# Patient Record
Sex: FEMALE | Race: WHITE | NOT HISPANIC OR LATINO | Employment: PART TIME | URBAN - NONMETROPOLITAN AREA
[De-identification: names, ages, dates, MRNs, and addresses within clinical notes are randomized per-mention and may not be internally consistent; named-entity substitution may affect disease eponyms.]

---
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
## 2019-04-16 ENCOUNTER — HOSPITAL ENCOUNTER (EMERGENCY)
Age: 19
Discharge: HOME | End: 2019-04-16
Payer: MEDICAID

## 2019-04-16 ENCOUNTER — APPOINTMENT (OUTPATIENT)
Dept: ULTRASOUND IMAGING | Age: 19
End: 2019-04-16
Payer: MEDICAID

## 2019-04-16 VITALS
WEIGHT: 204 LBS | HEIGHT: 63 IN | HEART RATE: 97 BPM | BODY MASS INDEX: 36.14 KG/M2 | DIASTOLIC BLOOD PRESSURE: 74 MMHG | SYSTOLIC BLOOD PRESSURE: 129 MMHG | RESPIRATION RATE: 16 BRPM | OXYGEN SATURATION: 99 % | TEMPERATURE: 98.3 F

## 2019-04-16 DIAGNOSIS — N30.01 ACUTE CYSTITIS WITH HEMATURIA: Primary | ICD-10-CM

## 2019-04-16 LAB
ALBUMIN SERPL-MCNC: 4.5 G/DL (ref 3.5–5.2)
ALP BLD-CCNC: 70 U/L (ref 35–104)
ALT SERPL-CCNC: 16 U/L (ref 5–33)
ANION GAP SERPL CALCULATED.3IONS-SCNC: 12 MMOL/L (ref 7–19)
AST SERPL-CCNC: 16 U/L (ref 5–32)
BACTERIA: ABNORMAL /HPF
BASOPHILS ABSOLUTE: 0 K/UL (ref 0–0.2)
BASOPHILS RELATIVE PERCENT: 0.2 % (ref 0–1)
BILIRUB SERPL-MCNC: 1.2 MG/DL (ref 0.2–1.2)
BILIRUBIN URINE: NEGATIVE
BLOOD, URINE: NEGATIVE
BUN BLDV-MCNC: 5 MG/DL (ref 6–20)
CALCIUM SERPL-MCNC: 9.2 MG/DL (ref 8.6–10)
CHLORIDE BLD-SCNC: 103 MMOL/L (ref 98–111)
CLARITY: ABNORMAL
CO2: 24 MMOL/L (ref 22–29)
COLOR: YELLOW
CREAT SERPL-MCNC: 0.6 MG/DL (ref 0.5–0.9)
EOSINOPHILS ABSOLUTE: 0.1 K/UL (ref 0–0.6)
EOSINOPHILS RELATIVE PERCENT: 0.7 % (ref 0–5)
EPITHELIAL CELLS, UA: 8 /HPF (ref 0–5)
GFR NON-AFRICAN AMERICAN: >60
GLUCOSE BLD-MCNC: 97 MG/DL (ref 74–109)
GLUCOSE URINE: NEGATIVE MG/DL
GONADOTROPIN, CHORIONIC (HCG) QUANT: 39.1 MIU/ML (ref 0–5.3)
HCG(URINE) PREGNANCY TEST: POSITIVE
HCT VFR BLD CALC: 41.2 % (ref 37–47)
HEMOGLOBIN: 13.6 G/DL (ref 12–16)
HYALINE CASTS: 2 /HPF (ref 0–8)
KETONES, URINE: NEGATIVE MG/DL
LEUKOCYTE ESTERASE, URINE: ABNORMAL
LIPASE: 26 U/L (ref 13–60)
LYMPHOCYTES ABSOLUTE: 2.2 K/UL (ref 1.1–4.5)
LYMPHOCYTES RELATIVE PERCENT: 27.2 % (ref 20–40)
MCH RBC QN AUTO: 27.6 PG (ref 27–31)
MCHC RBC AUTO-ENTMCNC: 33 G/DL (ref 33–37)
MCV RBC AUTO: 83.6 FL (ref 81–99)
MONOCYTES ABSOLUTE: 0.5 K/UL (ref 0–0.9)
MONOCYTES RELATIVE PERCENT: 5.9 % (ref 0–10)
NEUTROPHILS ABSOLUTE: 5.3 K/UL (ref 1.5–7.5)
NEUTROPHILS RELATIVE PERCENT: 65.8 % (ref 50–65)
NITRITE, URINE: NEGATIVE
PDW BLD-RTO: 12.4 % (ref 11.5–14.5)
PH UA: 6 (ref 5–8)
PLATELET # BLD: 333 K/UL (ref 130–400)
PMV BLD AUTO: 9.3 FL (ref 9.4–12.3)
POTASSIUM SERPL-SCNC: 3.8 MMOL/L (ref 3.5–5)
PROTEIN UA: NEGATIVE MG/DL
RBC # BLD: 4.93 M/UL (ref 4.2–5.4)
RBC UA: 2 /HPF (ref 0–4)
SODIUM BLD-SCNC: 139 MMOL/L (ref 136–145)
SPECIFIC GRAVITY UA: 1.01 (ref 1–1.03)
TOTAL PROTEIN: 7.6 G/DL (ref 6.6–8.7)
URINE REFLEX TO CULTURE: YES
UROBILINOGEN, URINE: 0.2 E.U./DL
WBC # BLD: 8 K/UL (ref 4.8–10.8)
WBC UA: 22 /HPF (ref 0–5)

## 2019-04-16 PROCEDURE — 84703 CHORIONIC GONADOTROPIN ASSAY: CPT

## 2019-04-16 PROCEDURE — 99284 EMERGENCY DEPT VISIT MOD MDM: CPT

## 2019-04-16 PROCEDURE — 84702 CHORIONIC GONADOTROPIN TEST: CPT

## 2019-04-16 PROCEDURE — 36415 COLL VENOUS BLD VENIPUNCTURE: CPT

## 2019-04-16 PROCEDURE — 81001 URINALYSIS AUTO W/SCOPE: CPT

## 2019-04-16 PROCEDURE — 99283 EMERGENCY DEPT VISIT LOW MDM: CPT | Performed by: PHYSICIAN ASSISTANT

## 2019-04-16 PROCEDURE — 80053 COMPREHEN METABOLIC PANEL: CPT

## 2019-04-16 PROCEDURE — 2580000003 HC RX 258: Performed by: PHYSICIAN ASSISTANT

## 2019-04-16 PROCEDURE — 76815 OB US LIMITED FETUS(S): CPT

## 2019-04-16 PROCEDURE — 85025 COMPLETE CBC W/AUTO DIFF WBC: CPT

## 2019-04-16 PROCEDURE — 83690 ASSAY OF LIPASE: CPT

## 2019-04-16 PROCEDURE — 87086 URINE CULTURE/COLONY COUNT: CPT

## 2019-04-16 RX ORDER — CEPHALEXIN 500 MG/1
500 CAPSULE ORAL 2 TIMES DAILY
Qty: 14 CAPSULE | Refills: 0 | Status: SHIPPED | OUTPATIENT
Start: 2019-04-16 | End: 2019-04-23

## 2019-04-16 RX ORDER — ONDANSETRON 2 MG/ML
4 INJECTION INTRAMUSCULAR; INTRAVENOUS ONCE
Status: DISCONTINUED | OUTPATIENT
Start: 2019-04-16 | End: 2019-04-16 | Stop reason: HOSPADM

## 2019-04-16 RX ORDER — 0.9 % SODIUM CHLORIDE 0.9 %
1000 INTRAVENOUS SOLUTION INTRAVENOUS ONCE
Status: COMPLETED | OUTPATIENT
Start: 2019-04-16 | End: 2019-04-16

## 2019-04-16 RX ADMIN — SODIUM CHLORIDE 1000 ML: 9 INJECTION, SOLUTION INTRAVENOUS at 12:37

## 2019-04-16 ASSESSMENT — PAIN SCALES - GENERAL: PAINLEVEL_OUTOF10: 3

## 2019-04-17 ASSESSMENT — ENCOUNTER SYMPTOMS
STRIDOR: 0
NAUSEA: 0
VOMITING: 0
CHOKING: 0
ABDOMINAL PAIN: 1
COUGH: 0
SHORTNESS OF BREATH: 0
WHEEZING: 0
DIARRHEA: 0

## 2019-04-17 NOTE — ED PROVIDER NOTES
[facility] EMERGENCY DEPT  eMERGENCYdEPARTMENT eNCOUnter      Pt Name: Radha Ellis  MRN: [id number]  [place] 2000  Date of evaluation: 4/16/2019  Provider:SANG Motley    CHIEF COMPLAINT       Chief Complaint   Patient presents with    Abdominal Pain     pt presents to ED With c/o lower abdominal pain states frequent UTI's          HISTORY OF PRESENT ILLNESS  (Location/Symptom, Timing/Onset, Context/Setting, Quality, Duration, Modifying Factors, Severity.)   Radha Ellis is a 20 y.o. female who presents to the emergency department with acute onset of abdominal pain suprapubic 48 hrs has history of UTI. She denies bleeding or discharge. She admits to unprotected sex thinks her last period was 4 weeks ago. Denies flank pain denies bowel habit changes. Denies decreased urine. Denies nausea. HPI    Nursing Notes were reviewed and I agree. REVIEW OF SYSTEMS    (2-9 systems for level 4, 10 or more for level 5)     Review of Systems   Constitutional: Negative for diaphoresis, fatigue and fever. Respiratory: Negative for cough, choking, shortness of breath, wheezing and stridor. Cardiovascular: Negative for chest pain, palpitations and leg swelling. Gastrointestinal: Positive for abdominal pain. Negative for diarrhea, nausea and vomiting. Genitourinary: Positive for dysuria and pelvic pain. Except as noted above the remainder of the review of systems was reviewed and negative. PAST MEDICAL HISTORY     Past Medical History:   Diagnosis Date    Mental disorder depression    diagnosed in 2012 with major depression, counseling monthly, off medications.  period of self mutilation(cutting) followed by suicide attempt with plan to overdose on brother's adhd medication         SURGICAL HISTORY       Past Surgical History:   Procedure Laterality Date    CHOLECYSTECTOMY, LAPAROSCOPIC N/A 2/6/2017    CHOLECYSTECTOMY LAPAROSCOPIC, with cholangiogram performed by Blaine Santana MD at [address] [place] [address] KNEE SURGERY Left broke below knee cap    OVARIAN CYST REMOVAL Right 07/15/2016         CURRENT MEDICATIONS       Discharge Medication List as of 4/16/2019  2:47 PM      CONTINUE these medications which have NOT CHANGED    Details   !! naproxen (NAPROSYN) 500 MG tablet Take 1 tablet by mouth 2 times daily, Disp-20 tablet, R-0Print      !! naproxen (NAPROSYN) 500 MG tablet Take 1 tablet by mouth 2 times daily, Disp-15 tablet, R-0Print      ondansetron (ZOFRAN ODT) 4 MG disintegrating tablet Take 1 tablet by mouth every 8 hours as needed for Nausea or Vomiting, Disp-10 tablet, R-0Print       !! - Potential duplicate medications found. Please discuss with provider. ALLERGIES     Patient has no known allergies.     FAMILY HISTORY       Family History   Problem Relation Age of Onset    Cancer Father     Cancer Paternal Aunt     Diabetes Paternal Grandfather           SOCIAL HISTORY       Social History     Socioeconomic History    Marital status: Single     Spouse name: Not on file    Number of children: Not on file    Years of education: Not on file    Highest education level: Not on file   Occupational History    Not on file   Social Needs    Financial resource strain: Not on file    Food insecurity:     Worry: Not on file     Inability: Not on file    Transportation needs:     Medical: Not on file     Non-medical: Not on file   Tobacco Use    Smoking status: Never Smoker    Smokeless tobacco: Never Used   Substance and Sexual Activity    Alcohol use: No    Drug use: No    Sexual activity: Yes     Partners: Male   Lifestyle    Physical activity:     Days per week: Not on file     Minutes per session: Not on file    Stress: Not on file   Relationships    Social connections:     Talks on phone: Not on file     Gets together: Not on file     Attends Gnosticist service: Not on file     Active member of club or organization: Not on file     Attends meetings of clubs or organizations: Not on file Relationship status: Not on file    Intimate partner violence:     Fear of current or ex partner: Not on file     Emotionally abused: Not on file     Physically abused: Not on file     Forced sexual activity: Not on file   Other Topics Concern    Not on file   Social History Narrative    Not on file       SCREENINGS           PHYSICAL EXAM    (up to 7 forlevel 4, 8 or more for level 5)     ED Triage Vitals [04/16/19 1211]   BP Temp Temp Source Pulse Resp SpO2 Height Weight   136/85 98.3 °F (36.8 °C) Oral 102 16 99 % 5' 3\" (1.6 m) 204 lb (92.5 kg)       Physical Exam   Constitutional: She is oriented to person, place, and time. She appears well-developed and well-nourished. No distress. HENT:   Head: Normocephalic and atraumatic. Right Ear: External ear normal.   Left Ear: External ear normal.   Nose: Nose normal.   Mouth/Throat: Oropharynx is clear and moist. No oropharyngeal exudate. Eyes: Pupils are equal, round, and reactive to light. Conjunctivae and EOM are normal. Right eye exhibits no discharge. Left eye exhibits no discharge. Neck: Normal range of motion. Neck supple. No thyromegaly present. Cardiovascular: Normal rate, regular rhythm, normal heart sounds and intact distal pulses. Exam reveals no friction rub. No murmur heard. Pulmonary/Chest: Effort normal and breath sounds normal. No stridor. No respiratory distress. She has no wheezes. Abdominal: Soft. Bowel sounds are normal. She exhibits no distension. There is no tenderness. Genitourinary: Vagina normal. No vaginal discharge found. Musculoskeletal: Normal range of motion. She exhibits no edema. Neurological: She is alert and oriented to person, place, and time. She displays normal reflexes. No cranial nerve deficit or sensory deficit. She exhibits normal muscle tone. Coordination normal.   Skin: Skin is warm and dry. Capillary refill takes less than 2 seconds. No rash noted. She is not diaphoretic.    Psychiatric: She has a normal mood and affect. Her behavior is normal. Judgment and thought content normal.   Nursing note and vitals reviewed. DIAGNOSTIC RESULTS     RADIOLOGY:   Non-plain film images such as CT, Ultrasound and MRI are read by the radiologist. Plain radiographic images are visualized and preliminarilyinterpreted by No att. providers found with the below findings:    Interpretation per the Radiologist below, if available at the time of this note:    US OB 1 [facility]   Final Result   1. There is no gestational sac identified in the uterus. Ectopic   pregnancy cannot be excluded on this study. Beta-hCG level is only 39   units. 2. Probable hemorrhagic cyst/follicle within the left ovary with size   measurements listed above. Developing corpus luteum cyst possible. There is a dominant follicle within the right ovary. The full report of this exam was immediately signed and available to   the emergency room. The patient is currently in the emergency room.    Signed by Dr Nadeem Garcia on 4/16/2019 2:35 PM          LABS:  Labs Reviewed   CBC WITH AUTO DIFFERENTIAL - Abnormal; Notable for the following components:       Result Value    MPV 9.3 (*)     Neutrophils % 65.8 (*)     All other components within normal limits   COMPREHENSIVE METABOLIC PANEL - Abnormal; Notable for the following components:    BUN 5 (*)     All other components within normal limits   URINE RT REFLEX TO CULTURE - Abnormal; Notable for the following components:    Clarity, UA CLOUDY (*)     Leukocyte Esterase, Urine SMALL (*)     All other components within normal limits   PREGNANCY, URINE - Abnormal; Notable for the following components:    HCG(Urine) Pregnancy Test POSITIVE (*)     All other components within normal limits    Narrative:     [facility]  [place]  [facility] ,  Urinalysis results called to and read back by faisal coker er, 04/16/2019 12:44,  by Fidelina Pineda - Abnormal; Notable for the following components:

## 2019-04-18 LAB — URINE CULTURE, ROUTINE: NORMAL

## 2019-10-29 ENCOUNTER — HOSPITAL ENCOUNTER (EMERGENCY)
Age: 19
Discharge: HOME | End: 2019-10-29
Payer: MEDICAID

## 2019-10-29 VITALS
OXYGEN SATURATION: 98 % | TEMPERATURE: 98 F | HEART RATE: 70 BPM | WEIGHT: 212 LBS | BODY MASS INDEX: 39.01 KG/M2 | HEIGHT: 62 IN | RESPIRATION RATE: 20 BRPM | SYSTOLIC BLOOD PRESSURE: 126 MMHG | DIASTOLIC BLOOD PRESSURE: 70 MMHG

## 2019-10-29 DIAGNOSIS — N39.0 URINARY TRACT INFECTION WITHOUT HEMATURIA, SITE UNSPECIFIED: ICD-10-CM

## 2019-10-29 DIAGNOSIS — O26.899 PELVIC PAIN IN PREGNANCY: Primary | ICD-10-CM

## 2019-10-29 DIAGNOSIS — R10.2 PELVIC PAIN IN PREGNANCY: Primary | ICD-10-CM

## 2019-10-29 LAB
BACTERIA: NEGATIVE /HPF
BILIRUBIN URINE: NEGATIVE
BLOOD, URINE: NEGATIVE
CLARITY: CLEAR
COLOR: YELLOW
EPITHELIAL CELLS, UA: 8 /HPF (ref 0–5)
GLUCOSE URINE: NEGATIVE MG/DL
GONADOTROPIN, CHORIONIC (HCG) QUANT: ABNORMAL MIU/ML (ref 0–5.3)
HYALINE CASTS: 3 /HPF (ref 0–8)
KETONES, URINE: 40 MG/DL
LEUKOCYTE ESTERASE, URINE: ABNORMAL
NITRITE, URINE: NEGATIVE
PH UA: 6.5 (ref 5–8)
PROTEIN UA: NEGATIVE MG/DL
RBC UA: 1 /HPF (ref 0–4)
SPECIFIC GRAVITY UA: 1.01 (ref 1–1.03)
URINE REFLEX TO CULTURE: YES
UROBILINOGEN, URINE: 1 E.U./DL
WBC UA: 10 /HPF (ref 0–5)

## 2019-10-29 PROCEDURE — 81001 URINALYSIS AUTO W/SCOPE: CPT

## 2019-10-29 PROCEDURE — 36415 COLL VENOUS BLD VENIPUNCTURE: CPT

## 2019-10-29 PROCEDURE — 84702 CHORIONIC GONADOTROPIN TEST: CPT

## 2019-10-29 PROCEDURE — 87086 URINE CULTURE/COLONY COUNT: CPT

## 2019-10-29 PROCEDURE — 99283 EMERGENCY DEPT VISIT LOW MDM: CPT

## 2019-10-29 RX ORDER — PROMETHAZINE HYDROCHLORIDE 25 MG/1
25 TABLET ORAL EVERY 6 HOURS PRN
COMMUNITY
End: 2020-05-22

## 2019-10-29 RX ORDER — CEPHALEXIN 500 MG/1
500 CAPSULE ORAL 2 TIMES DAILY
Qty: 20 CAPSULE | Refills: 0 | Status: SHIPPED | OUTPATIENT
Start: 2019-10-29 | End: 2019-11-08

## 2019-10-29 ASSESSMENT — PAIN DESCRIPTION - LOCATION: LOCATION: ABDOMEN

## 2019-10-29 ASSESSMENT — PAIN DESCRIPTION - FREQUENCY: FREQUENCY: INTERMITTENT

## 2019-10-29 ASSESSMENT — PAIN DESCRIPTION - ONSET: ONSET: ON-GOING

## 2019-10-29 ASSESSMENT — PAIN DESCRIPTION - PROGRESSION: CLINICAL_PROGRESSION: NOT CHANGED

## 2019-10-29 ASSESSMENT — PAIN DESCRIPTION - ORIENTATION: ORIENTATION: LOWER

## 2019-10-29 ASSESSMENT — PAIN DESCRIPTION - PAIN TYPE
TYPE: ACUTE PAIN
TYPE: ACUTE PAIN

## 2019-10-29 ASSESSMENT — PAIN SCALES - GENERAL: PAINLEVEL_OUTOF10: 3

## 2019-10-31 LAB — URINE CULTURE, ROUTINE: NORMAL

## 2019-11-19 PROCEDURE — 87147 CULTURE TYPE IMMUNOLOGIC: CPT | Performed by: FAMILY MEDICINE

## 2019-11-19 PROCEDURE — 87081 CULTURE SCREEN ONLY: CPT | Performed by: FAMILY MEDICINE

## 2019-11-21 ENCOUNTER — TELEPHONE (OUTPATIENT)
Dept: URGENT CARE | Facility: CLINIC | Age: 19
End: 2019-11-21

## 2019-11-21 DIAGNOSIS — J03.00 STREP TONSILLITIS: Primary | ICD-10-CM

## 2019-11-21 RX ORDER — AMOXICILLIN AND CLAVULANATE POTASSIUM 875; 125 MG/1; MG/1
1 TABLET, FILM COATED ORAL EVERY 12 HOURS SCHEDULED
Qty: 20 TABLET | Refills: 0 | Status: SHIPPED | OUTPATIENT
Start: 2019-11-21 | End: 2019-12-01

## 2020-02-17 ENCOUNTER — HOSPITAL ENCOUNTER (OUTPATIENT)
Age: 20
Discharge: HOME | End: 2020-02-17
Attending: OBSTETRICS & GYNECOLOGY | Admitting: OBSTETRICS & GYNECOLOGY
Payer: MEDICAID

## 2020-02-17 VITALS
DIASTOLIC BLOOD PRESSURE: 72 MMHG | HEART RATE: 95 BPM | TEMPERATURE: 97.1 F | RESPIRATION RATE: 14 BRPM | SYSTOLIC BLOOD PRESSURE: 114 MMHG

## 2020-02-17 PROBLEM — Z3A.22 22 WEEKS GESTATION OF PREGNANCY: Status: ACTIVE | Noted: 2020-02-17

## 2020-02-17 PROCEDURE — 99211 OFF/OP EST MAY X REQ PHY/QHP: CPT

## 2020-02-17 PROCEDURE — 4500000002 HC ER NO CHARGE

## 2020-02-17 RX ORDER — SODIUM CHLORIDE 0.9 % (FLUSH) 0.9 %
10 SYRINGE (ML) INJECTION PRN
Status: DISCONTINUED | OUTPATIENT
Start: 2020-02-17 | End: 2020-02-18 | Stop reason: HOSPADM

## 2020-02-17 RX ORDER — SODIUM CHLORIDE 0.9 % (FLUSH) 0.9 %
10 SYRINGE (ML) INJECTION EVERY 12 HOURS SCHEDULED
Status: DISCONTINUED | OUTPATIENT
Start: 2020-02-17 | End: 2020-02-18 | Stop reason: HOSPADM

## 2020-02-24 ENCOUNTER — INITIAL PRENATAL (OUTPATIENT)
Dept: OBGYN | Age: 20
End: 2020-02-24
Payer: MEDICAID

## 2020-02-24 VITALS
BODY MASS INDEX: 36.4 KG/M2 | SYSTOLIC BLOOD PRESSURE: 117 MMHG | HEART RATE: 101 BPM | DIASTOLIC BLOOD PRESSURE: 72 MMHG | WEIGHT: 199 LBS

## 2020-02-24 PROCEDURE — G8427 DOCREV CUR MEDS BY ELIG CLIN: HCPCS | Performed by: NURSE PRACTITIONER

## 2020-02-24 PROCEDURE — G8484 FLU IMMUNIZE NO ADMIN: HCPCS | Performed by: NURSE PRACTITIONER

## 2020-02-24 PROCEDURE — 1036F TOBACCO NON-USER: CPT | Performed by: NURSE PRACTITIONER

## 2020-02-24 PROCEDURE — G8419 CALC BMI OUT NRM PARAM NOF/U: HCPCS | Performed by: NURSE PRACTITIONER

## 2020-02-24 PROCEDURE — 99203 OFFICE O/P NEW LOW 30 MIN: CPT | Performed by: NURSE PRACTITIONER

## 2020-02-24 NOTE — PROGRESS NOTES
Patient presents today for initial ob visit. Pt denies any vaginal leaking bleeding or contractions. + Fetal movement. She states she has seen Dr. Amina Rodgers, changed to Dr Dani Holt, then moved to [facility] and saw Dr Lisha Mohan is here for a new transfer obstetrical visit. Today she is 23w0d weeks EGA. She is doing well and has no complaints. Last seen 4 weeks ago. Hasn't had anatomy scan yet. States had miscarriage of twins around 6 weeks, hadn't had US to confirm heartbeat when miscarried. Was on prometrium early pregnancy this time, not anymore. Previous to miscarriage, had normal  without complications. Current on pap. Plan of care was discussed with patient. Patient was encouraged to adhere to a well-balanced diet, including increasing water intake and limiting excessive caffeine and salt. The benefits of exercise were discussed; however she was advised against heavy lifting, sit-ups and abdominal crunches. A list of safe OTC medications was provided and discussed. The patient was cautioned against the use of tanning beds, hot tubs, saunas, and x-rays. Avoidance of tobacco, alcohol and illicit drugs was also discussed due to harmful effects on the fetus and increased risks associated with pregnancy. Certain labs and ultrasounds are required at certain times during pregnancy but others are optional, including the serum integrated screen/[place]/AFP/ Panorama, and other genetic testing. The patient was encouraged to attend childbirth classes and general hospital information was provided based on patients hospital of choice. She  does not have vaginal bleeding, leaking of fluid, contractions. She does not have blurred vision, SOB, or increased swelling in legs or face. Pt does feel fetal movement regularly. O:   Vitals:    20 1053   BP: 117/72   Pulse: 101   Weight: 199 lb (90.3 kg)     Pt is A&Ox3, in no acute distress. Normocephalic, atraumatic. PERRL.  Resp even and

## 2020-02-24 NOTE — PATIENT INSTRUCTIONS
Patient Education        Weeks 22 to 26 of Your Pregnancy: Care Instructions  Your Care Instructions    As you enter your 7th month of pregnancy at week 26, your baby's lungs are growing stronger and getting ready to breathe. You may notice that your baby responds to the sound of your or your partner's voice. You may also notice that your baby does less turning and twisting and more squirming or jerking. Jerking often means that your baby has the hiccups. Hiccups are perfectly normal and are only temporary. You may want to think about attending a childbirth preparation class. This is also a good time to start thinking about whether you want to have pain medicine during labor. Most pregnant women are tested for gestational diabetes between weeks 25 and 28. Gestational diabetes occurs when your blood sugar level gets too high when you're pregnant. The test is important, because you can have gestational diabetes and not know it. But the condition can cause problems for your baby. Follow-up care is a key part of your treatment and safety. Be sure to make and go to all appointments, and call your doctor if you are having problems. It's also a good idea to know your test results and keep a list of the medicines you take. How can you care for yourself at home? Ease discomfort from your baby's kicking  · Change your position. Sometimes this will cause your baby to change position too. · Take a deep breath while you raise your arm over your head. Then breathe out while you drop your arm. Do Kegel exercises to prevent urine from leaking  · You can do Kegel exercises while you stand or sit. ? Squeeze the same muscles you would use to stop your urine. Your belly and thighs should not move. ? Hold the squeeze for 3 seconds, and then relax for 3 seconds. ? Start with 3 seconds. Then add 1 second each week until you are able to squeeze for 10 seconds. ? Repeat the exercise 10 to 15 times for each session.  Do three or more sessions each day. Ease or reduce swelling in your feet, ankles, hands, and fingers  · If your fingers are puffy, take off your rings. · Do not eat high-salt foods, such as potato chips. · Prop up your feet on a stool or couch as much as possible. Sleep with pillows under your feet. · Do not stand for long periods of time or wear tight shoes. · Wear support stockings. Where can you learn more? Go to https://Hig[employer] One.Medstory. org and sign in to your [employer] account. Enter G264 in the [employer] box to learn more about \"Weeks 22 to 26 of Your Pregnancy: Care Instructions. \"     If you do not have an account, please click on the \"Sign Up Now\" link. Current as of: May 29, 2019  Content Version: 12.3  © 0674-9871 Healthwise, Incorporated. Care instructions adapted under license by [facility] ([facility]). If you have questions about a medical condition or this instruction, always ask your healthcare professional. [address] any warranty or liability for your use of this information.

## 2020-03-03 ENCOUNTER — TELEPHONE (OUTPATIENT)
Dept: OBGYN | Age: 20
End: 2020-03-03

## 2020-03-24 ENCOUNTER — ROUTINE PRENATAL (OUTPATIENT)
Dept: OBGYN | Age: 20
End: 2020-03-24
Payer: MEDICAID

## 2020-03-24 VITALS
DIASTOLIC BLOOD PRESSURE: 78 MMHG | SYSTOLIC BLOOD PRESSURE: 111 MMHG | WEIGHT: 196 LBS | HEART RATE: 77 BPM | BODY MASS INDEX: 35.85 KG/M2

## 2020-03-24 DIAGNOSIS — Z34.82 ENCOUNTER FOR SUPERVISION OF OTHER NORMAL PREGNANCY IN SECOND TRIMESTER: ICD-10-CM

## 2020-03-24 LAB
BASOPHILS ABSOLUTE: 0 K/UL (ref 0–0.2)
BASOPHILS RELATIVE PERCENT: 0.1 % (ref 0–1)
EOSINOPHILS ABSOLUTE: 0 K/UL (ref 0–0.6)
EOSINOPHILS RELATIVE PERCENT: 0.6 % (ref 0–5)
GLUCOSE, 1HR PP: 191 MG/DL (ref 75–140)
HCT VFR BLD CALC: 37.6 % (ref 37–47)
HEMOGLOBIN: 12.4 G/DL (ref 12–16)
IMMATURE GRANULOCYTES #: 0 K/UL
LYMPHOCYTES ABSOLUTE: 1.5 K/UL (ref 1.1–4.5)
LYMPHOCYTES RELATIVE PERCENT: 20.2 % (ref 20–40)
MCH RBC QN AUTO: 27.8 PG (ref 27–31)
MCHC RBC AUTO-ENTMCNC: 33 G/DL (ref 33–37)
MCV RBC AUTO: 84.3 FL (ref 81–99)
MONOCYTES ABSOLUTE: 0.4 K/UL (ref 0–0.9)
MONOCYTES RELATIVE PERCENT: 5.4 % (ref 0–10)
NEUTROPHILS ABSOLUTE: 5.3 K/UL (ref 1.5–7.5)
NEUTROPHILS RELATIVE PERCENT: 73.4 % (ref 50–65)
PDW BLD-RTO: 13.2 % (ref 11.5–14.5)
PLATELET # BLD: 206 K/UL (ref 130–400)
PMV BLD AUTO: 10.2 FL (ref 9.4–12.3)
RBC # BLD: 4.46 M/UL (ref 4.2–5.4)
WBC # BLD: 7.2 K/UL (ref 4.8–10.8)

## 2020-03-24 PROCEDURE — 99212 OFFICE O/P EST SF 10 MIN: CPT | Performed by: OBSTETRICS & GYNECOLOGY

## 2020-03-24 PROCEDURE — G8427 DOCREV CUR MEDS BY ELIG CLIN: HCPCS | Performed by: OBSTETRICS & GYNECOLOGY

## 2020-03-24 PROCEDURE — G8484 FLU IMMUNIZE NO ADMIN: HCPCS | Performed by: OBSTETRICS & GYNECOLOGY

## 2020-03-24 PROCEDURE — G8419 CALC BMI OUT NRM PARAM NOF/U: HCPCS | Performed by: OBSTETRICS & GYNECOLOGY

## 2020-03-24 PROCEDURE — 1036F TOBACCO NON-USER: CPT | Performed by: OBSTETRICS & GYNECOLOGY

## 2020-03-24 NOTE — PATIENT INSTRUCTIONS
Patient Education        Weeks 26 to 30 of Your Pregnancy: Care Instructions  Your Care Instructions    You are now in your last trimester of pregnancy. Your baby is growing rapidly. And you'll probably feel your baby moving around more often. Your doctor may ask you to count your baby's kicks. Your back may ache as your body gets used to your baby's size and length. If you haven't already had the Tdap shot during this pregnancy, talk to your doctor about getting it. It will help protect your  against pertussis infection. During this time, it's important to take care of yourself and pay attention to what your body needs. If you feel sexual, explore ways to be close with your partner that match your comfort and desire. Use the tips provided in this care sheet to find ways to be sexual in your own way. Follow-up care is a key part of your treatment and safety. Be sure to make and go to all appointments, and call your doctor if you are having problems. It's also a good idea to know your test results and keep a list of the medicines you take. How can you care for yourself at home? Take it easy at work  · Take frequent breaks. If possible, stop working when you are tired, and rest during your lunch hour. · Take bathroom breaks every 2 hours. · Change positions often. If you sit for long periods, stand up and walk around. · When you stand for a long time, keep one foot on a low stool with your knee bent. After standing a lot, sit with your feet up. · Avoid fumes, chemicals, and tobacco smoke. Be sexual in your own way  · Having sex during pregnancy is okay, unless your doctor tells you not to. · You may be very interested in sex, or you may have no interest at all. · Your growing belly can make it hard to find a good position during intercourse. [place] and explore. · You may get cramps in your uterus when your partner touches your breasts.   · A back rub may relieve the backache or cramps that

## 2020-04-20 ENCOUNTER — HOSPITAL ENCOUNTER (OUTPATIENT)
Age: 20
Discharge: HOME | End: 2020-04-20
Attending: OBSTETRICS & GYNECOLOGY | Admitting: OBSTETRICS & GYNECOLOGY
Payer: MEDICAID

## 2020-04-20 VITALS
SYSTOLIC BLOOD PRESSURE: 123 MMHG | RESPIRATION RATE: 16 BRPM | TEMPERATURE: 98.6 F | DIASTOLIC BLOOD PRESSURE: 75 MMHG | HEART RATE: 85 BPM

## 2020-04-20 LAB
BACTERIA: ABNORMAL /HPF
BILIRUBIN URINE: NEGATIVE
BLOOD, URINE: NEGATIVE
CLARITY: ABNORMAL
COLOR: ABNORMAL
EPITHELIAL CELLS, UA: ABNORMAL /HPF
GLUCOSE URINE: NEGATIVE MG/DL
KETONES, URINE: ABNORMAL MG/DL
LEUKOCYTE ESTERASE, URINE: ABNORMAL
NITRITE, URINE: NEGATIVE
PH UA: 6 (ref 5–8)
PROTEIN UA: ABNORMAL MG/DL
SPECIFIC GRAVITY UA: 1.03 (ref 1–1.03)
UROBILINOGEN, URINE: 1 E.U./DL
WBC UA: ABNORMAL /HPF (ref 0–5)

## 2020-04-20 PROCEDURE — 99211 OFF/OP EST MAY X REQ PHY/QHP: CPT

## 2020-04-20 PROCEDURE — 6370000000 HC RX 637 (ALT 250 FOR IP): Performed by: OBSTETRICS & GYNECOLOGY

## 2020-04-20 PROCEDURE — 81001 URINALYSIS AUTO W/SCOPE: CPT

## 2020-04-20 PROCEDURE — 4500000002 HC ER NO CHARGE

## 2020-04-20 RX ORDER — SODIUM CHLORIDE 0.9 % (FLUSH) 0.9 %
10 SYRINGE (ML) INJECTION PRN
Status: DISCONTINUED | OUTPATIENT
Start: 2020-04-20 | End: 2020-04-20 | Stop reason: HOSPADM

## 2020-04-20 RX ORDER — SODIUM CHLORIDE 0.9 % (FLUSH) 0.9 %
10 SYRINGE (ML) INJECTION EVERY 12 HOURS SCHEDULED
Status: DISCONTINUED | OUTPATIENT
Start: 2020-04-20 | End: 2020-04-20 | Stop reason: HOSPADM

## 2020-04-20 RX ORDER — ONDANSETRON 2 MG/ML
4 INJECTION INTRAMUSCULAR; INTRAVENOUS EVERY 6 HOURS PRN
Status: DISCONTINUED | OUTPATIENT
Start: 2020-04-20 | End: 2020-04-20 | Stop reason: HOSPADM

## 2020-04-20 RX ORDER — ACETAMINOPHEN 325 MG/1
650 TABLET ORAL EVERY 4 HOURS PRN
Status: DISCONTINUED | OUTPATIENT
Start: 2020-04-20 | End: 2020-04-20 | Stop reason: HOSPADM

## 2020-04-20 RX ORDER — PROMETHAZINE HYDROCHLORIDE 25 MG/1
12.5 TABLET ORAL EVERY 6 HOURS PRN
Status: DISCONTINUED | OUTPATIENT
Start: 2020-04-20 | End: 2020-04-20 | Stop reason: HOSPADM

## 2020-04-20 RX ORDER — NITROFURANTOIN 25; 75 MG/1; MG/1
100 CAPSULE ORAL EVERY 12 HOURS SCHEDULED
Status: DISCONTINUED | OUTPATIENT
Start: 2020-04-20 | End: 2020-04-20 | Stop reason: HOSPADM

## 2020-04-20 RX ADMIN — NITROFURANTOIN MONOHYDRATE/MACROCRYSTALLINE 100 MG: 25; 75 CAPSULE ORAL at 19:00

## 2020-05-04 ENCOUNTER — HOSPITAL ENCOUNTER (OUTPATIENT)
Age: 20
Discharge: HOME | End: 2020-05-04
Attending: ADVANCED PRACTICE MIDWIFE | Admitting: ADVANCED PRACTICE MIDWIFE
Payer: MEDICAID

## 2020-05-04 VITALS
RESPIRATION RATE: 14 BRPM | SYSTOLIC BLOOD PRESSURE: 121 MMHG | TEMPERATURE: 98.2 F | HEART RATE: 99 BPM | DIASTOLIC BLOOD PRESSURE: 71 MMHG

## 2020-05-04 PROBLEM — Z3A.33 33 WEEKS GESTATION OF PREGNANCY: Status: ACTIVE | Noted: 2020-05-04

## 2020-05-04 PROCEDURE — 59025 FETAL NON-STRESS TEST: CPT

## 2020-05-04 PROCEDURE — 4500000002 HC ER NO CHARGE

## 2020-05-04 PROCEDURE — 99211 OFF/OP EST MAY X REQ PHY/QHP: CPT

## 2020-05-05 LAB
GLUCOSE FASTING: 85 MG/DL (ref 75–110)
GLUCOSE TOLERANCE TEST 1 HOUR: 205 MG/DL (ref 75–190)
GLUCOSE TOLERANCE TEST 2 HOUR: 184 MG/DL (ref 75–140)
GLUCOSE, 3 HOUR: 137 MG/DL (ref 75–145)

## 2020-05-05 NOTE — FLOWSHEET NOTE
Pt presents with c/o mild contractions since 4am this morning causing some pelvic pressure. Reports feeling them about every 30 min. Denies any leaking or vaginal bleeding. +FM. Pt arrived to labor room. Pt given gown and assisted to bed. EFM and toco applied. Abdomen soft and non tender.

## 2020-05-08 ENCOUNTER — TELEMEDICINE (OUTPATIENT)
Dept: OBGYN | Age: 20
End: 2020-05-08
Payer: MEDICAID

## 2020-05-08 PROCEDURE — 99212 OFFICE O/P EST SF 10 MIN: CPT | Performed by: OBSTETRICS & GYNECOLOGY

## 2020-05-08 PROCEDURE — G8427 DOCREV CUR MEDS BY ELIG CLIN: HCPCS | Performed by: OBSTETRICS & GYNECOLOGY

## 2020-05-08 NOTE — PROGRESS NOTES
Prenatal VV Office Note    TELEHEALTH EVALUATION -- Audio/Visual (During XIMJS-01 public health emergency)    Felisha Nair is a 23 y.o. female who presents today for her medical conditions/ complaints as noted below. Chief Complaint   Patient presents with    Routine Prenatal Visit       Subjective:  Felisha Nair signed on for VV/return obstetrical visit. Today she is 33w4d weeks EGA. She is doing well, taking her PNV as directed, and has no complaints. She  does not have vaginal bleeding, n/v, syncope, or headaches. Pt does feel fetal movement regularly. GTT confirmed GDM. Patient instructed on FS fasting and PP. Weekly NST. Patient Active Problem List   Diagnosis    Liver enzyme elevation    Choledocholithiasis    Abdominal pain, right upper quadrant    Elevated bilirubin    Gallstones    22 weeks gestation of pregnancy    33 weeks gestation of pregnancy       Patient's last menstrual period was 09/16/2019. [id number]    Past Medical History:   Diagnosis Date    Mental disorder depression    diagnosed in 2012 with major depression, counseling monthly, off medications.  period of self mutilation(cutting) followed by suicide attempt with plan to overdose on brother's adhd medication     Past Surgical History:   Procedure Laterality Date    CHOLECYSTECTOMY, LAPAROSCOPIC N/A 2/6/2017    CHOLECYSTECTOMY LAPAROSCOPIC, with cholangiogram performed by Clint Flores MD at [place] [address] Left broke below knee cap    OVARIAN CYST REMOVAL Right 07/15/2016     Family History   Problem Relation Age of Onset    Cancer Father     Cancer Paternal Aunt     Diabetes Paternal Grandfather      Social History     Tobacco Use    Smoking status: Never Smoker    Smokeless tobacco: Never Used   Substance Use Topics    Alcohol use: No       Current Outpatient Medications   Medication Sig Dispense Refill    Prenatal Vit-DSS-Fe Cbn-FA (PRENATAL AD PO) Take by mouth      promethazine (PHENERGAN) 25 MG tablet Take 25 mg by mouth every 6 hours as needed for Nausea       No current facility-administered medications for this visit. No Known Allergies  There were no vitals filed for this visit. There is no height or weight on file to calculate BMI. Due to this being a TeleHealth encounter, evaluation of the following organ systems is limited: Vitals/Constitutional/EENT/Resp/CV/GI//MS/Neuro/Skin/Heme-Lymph-Imm. Objective:     Pt is A&Ox3, in no acute distress. Normocephalic, atraumatic. PERRL. Resp even and non-labored. Skin pink, warm & dry. Gravid abdomen. GONZALEZ's well. Gait steady. PLAN:  IUP at 33w4d wks      Diagnosis Orders   1. 33 weeks gestation of pregnancy     2. Gestational diabetes mellitus (GDM) in third trimester, gestational diabetes method of control unspecified        Pt counseled on balanced nutrition, adequate fluid intake, taking PNV daily, and exercise along with labor precautions, GHTN precautions, Kick count,  labor and Genetic testing   Continue with routine prenatal care.  RTC in 1 wks for prenatal visit with NST   Send FS via [facility]     Pursuant to the emergency declaration under the [facility] waiver authority and the [employer] and [employer] General Act, this Virtual  Visit was conducted, with patient's consent, to reduce the patient's risk of exposure to COVID-19 and provide continuity of care for an established patient. Services were provided through a video synchronous discussion virtually to substitute for in-person clinic visit.

## 2020-05-08 NOTE — PATIENT INSTRUCTIONS
taking an over-the-counter stool softener. Consider breastfeeding  · Experts recommend that women breastfeed for 1 year or longer. Breast milk is the perfect food for babies. · Breast milk is easier for babies to digest than formula. And it is always available, just the right temperature, and free. · Breast milk may help protect your child from some health problems.  babies are less likely than formula-fed babies to:  ? Get ear infections, colds, diarrhea, and pneumonia. ? Be obese or get diabetes later in life. · Women who breastfeed have less bleeding after the birth. Their uteruses also shrink back faster. · Some women who breastfeed lose weight faster. Making milk burns calories. · Breastfeeding can lower your risk of breast cancer, ovarian cancer, and osteoporosis. Decide about circumcision for boys  · As you make this decision, it may help to think about your personal, Jew, and family traditions. You get to decide if you will keep your son's penis natural or if he will be circumcised. · If you decide that you would like to have your baby circumcised, talk with your doctor. You can share your concerns about pain. And you can discuss your preferences for anesthesia. Where can you learn more? Go to https://Cirrus Insightpepiceweb.healthAfrica [email or web address] org and sign in to your [employer]ink display technologies account. Enter F924 in the [employer] box to learn more about \"Weeks 32 to 34 of Your Pregnancy: Care Instructions. \"     If you do not have an account, please click on the \"Sign Up Now\" link. Current as of: May 29, 2019Content Version: 12.4  © 6440-4812 Healthwise, Incorporated. Care instructions adapted under license by [facility] [place] ([facility]). If you have questions about a medical condition or this instruction, always ask your healthcare professional. [place] [address] any warranty or liability for your use of this information.

## 2020-05-22 ENCOUNTER — ROUTINE PRENATAL (OUTPATIENT)
Dept: OBGYN | Age: 20
End: 2020-05-22
Payer: MEDICAID

## 2020-05-22 VITALS
SYSTOLIC BLOOD PRESSURE: 140 MMHG | BODY MASS INDEX: 37.31 KG/M2 | DIASTOLIC BLOOD PRESSURE: 77 MMHG | WEIGHT: 204 LBS | HEART RATE: 80 BPM

## 2020-05-22 PROCEDURE — G8427 DOCREV CUR MEDS BY ELIG CLIN: HCPCS | Performed by: OBSTETRICS & GYNECOLOGY

## 2020-05-22 PROCEDURE — 1036F TOBACCO NON-USER: CPT | Performed by: OBSTETRICS & GYNECOLOGY

## 2020-05-22 PROCEDURE — 59025 FETAL NON-STRESS TEST: CPT | Performed by: OBSTETRICS & GYNECOLOGY

## 2020-05-22 PROCEDURE — G8419 CALC BMI OUT NRM PARAM NOF/U: HCPCS | Performed by: OBSTETRICS & GYNECOLOGY

## 2020-05-22 PROCEDURE — 99213 OFFICE O/P EST LOW 20 MIN: CPT | Performed by: OBSTETRICS & GYNECOLOGY

## 2020-05-22 NOTE — PROGRESS NOTES
Pt presents today for a routine prenatal visit. Pt denies vaginal bleeding, leaking of fluid or cramping. Pt states + fetal movement. NST and GBS done today.
GDM  Discussed IOL at 38 weeks due to noncompliant GDM. RTC in 1 wks for prenatal visit with NST    MEDICATIONS:  No orders of the defined types were placed in this encounter. ORDERS:  Orders Placed This Encounter   Procedures    Culture, Strep B Screen, Vaginal/Rectal    Sexually Transmitted Disease Panel 1 TP    IA FETAL NON-STRESS TEST       I, Jesus Brown MD, personally performed services described in this document as scribed by Author TARIQ Bustillo in my presence, and it is both accurate and complete.

## 2020-05-25 LAB — GROUP B STREP CULTURE: NORMAL

## 2020-05-28 ENCOUNTER — ROUTINE PRENATAL (OUTPATIENT)
Dept: OBGYN | Age: 20
End: 2020-05-28
Payer: MEDICAID

## 2020-05-28 VITALS
WEIGHT: 202 LBS | HEART RATE: 80 BPM | SYSTOLIC BLOOD PRESSURE: 125 MMHG | BODY MASS INDEX: 36.95 KG/M2 | DIASTOLIC BLOOD PRESSURE: 71 MMHG

## 2020-05-28 PROCEDURE — 59025 FETAL NON-STRESS TEST: CPT | Performed by: ADVANCED PRACTICE MIDWIFE

## 2020-05-28 PROCEDURE — 1036F TOBACCO NON-USER: CPT | Performed by: ADVANCED PRACTICE MIDWIFE

## 2020-05-28 PROCEDURE — 99213 OFFICE O/P EST LOW 20 MIN: CPT | Performed by: ADVANCED PRACTICE MIDWIFE

## 2020-05-28 PROCEDURE — G8419 CALC BMI OUT NRM PARAM NOF/U: HCPCS | Performed by: ADVANCED PRACTICE MIDWIFE

## 2020-05-28 PROCEDURE — G8427 DOCREV CUR MEDS BY ELIG CLIN: HCPCS | Performed by: ADVANCED PRACTICE MIDWIFE

## 2020-05-30 ENCOUNTER — HOSPITAL ENCOUNTER (OUTPATIENT)
Age: 20
Discharge: HOME | End: 2020-05-30
Attending: OBSTETRICS & GYNECOLOGY | Admitting: OBSTETRICS & GYNECOLOGY
Payer: MEDICAID

## 2020-05-30 VITALS
TEMPERATURE: 97.3 F | SYSTOLIC BLOOD PRESSURE: 110 MMHG | DIASTOLIC BLOOD PRESSURE: 56 MMHG | HEART RATE: 79 BPM | RESPIRATION RATE: 20 BRPM

## 2020-05-30 PROBLEM — M54.50 LOW BACK PAIN: Status: ACTIVE | Noted: 2020-05-30

## 2020-05-30 LAB
BACTERIA: ABNORMAL /HPF
BILIRUBIN URINE: NEGATIVE
BLOOD, URINE: NEGATIVE
CLARITY: ABNORMAL
COLOR: YELLOW
EPITHELIAL CELLS, UA: 7 /HPF (ref 0–5)
GLUCOSE URINE: NEGATIVE MG/DL
HYALINE CASTS: 4 /HPF (ref 0–8)
KETONES, URINE: NEGATIVE MG/DL
LEUKOCYTE ESTERASE, URINE: ABNORMAL
NITRITE, URINE: NEGATIVE
PH UA: 7.5 (ref 5–8)
PROTEIN UA: NEGATIVE MG/DL
RBC UA: 0 /HPF (ref 0–4)
SPECIFIC GRAVITY UA: 1.01 (ref 1–1.03)
UROBILINOGEN, URINE: 1 E.U./DL
WBC UA: 14 /HPF (ref 0–5)
YEAST: ABNORMAL /HPF

## 2020-05-30 PROCEDURE — 4500000002 HC ER NO CHARGE

## 2020-05-30 PROCEDURE — 81001 URINALYSIS AUTO W/SCOPE: CPT

## 2020-05-30 PROCEDURE — 99211 OFF/OP EST MAY X REQ PHY/QHP: CPT

## 2020-05-30 RX ORDER — ONDANSETRON 2 MG/ML
4 INJECTION INTRAMUSCULAR; INTRAVENOUS EVERY 6 HOURS PRN
Status: DISCONTINUED | OUTPATIENT
Start: 2020-05-30 | End: 2020-05-30 | Stop reason: HOSPADM

## 2020-05-30 RX ORDER — PROMETHAZINE HYDROCHLORIDE 25 MG/1
12.5 TABLET ORAL EVERY 6 HOURS PRN
Status: DISCONTINUED | OUTPATIENT
Start: 2020-05-30 | End: 2020-05-30 | Stop reason: HOSPADM

## 2020-05-30 RX ORDER — ACETAMINOPHEN 325 MG/1
650 TABLET ORAL EVERY 4 HOURS PRN
Status: DISCONTINUED | OUTPATIENT
Start: 2020-05-30 | End: 2020-05-30 | Stop reason: HOSPADM

## 2020-05-30 NOTE — FLOWSHEET NOTE
Pt arrived from ER with complaints of low back pain and pressure. Pt states she has been constipated. Voided spec collected. Pt to bed.  efm applied. abd soft and nontender. sve done. No change from when she was checked earlier in the week at appt. Pt denies any leaking of fluid or vaginal bleeding.

## 2020-05-31 LAB
CHLAMYDIA TRACHOMATIS AMPLIFIED DET: NEGATIVE
N GONORRHOEAE AMPLIFIED DET: NEGATIVE
SPECIMEN SOURCE: NORMAL
T. VAGINALIS AMPLIFIED: NEGATIVE

## 2020-06-02 NOTE — PATIENT INSTRUCTIONS
Patient Education        Week 45 of Your Pregnancy: Care Instructions  Your Care Instructions     Believe it or not, your baby is almost here. You may have ideas about your baby's personality because of how much he or she moves. Or you may have noticed how he or she responds to sounds, warmth, cold, and light. You may even know what kind of music your baby likes. By now, you have a better idea of what to expect during delivery. You may have talked about your birth preferences with your doctor. But even if you want a vaginal birth, it is a good idea to learn about  births.  birth means that your baby is born through a cut (incision) in your lower belly. It is sometimes the best choice for the health of the baby and the mother. This care sheet can help you understand  births. It also gives you information about what to expect after your baby is born. And it helps you understand more about postpartum depression. Follow-up care is a key part of your treatment and safety. Be sure to make and go to all appointments, and call your doctor if you are having problems. It's also a good idea to know your test results and keep a list of the medicines you take. How can you care for yourself at home? Learn about  birth  · Most C-sections are unplanned. They are done because of problems that occur during labor. These problems might include:  ? Labor that slows or stops. ? High blood pressure or other problems for the mother. ? Signs of distress in the baby. These signs may include a very fast or slow heart rate. · Although most mothers and babies do well after , it is major surgery. It has more risks than a vaginal delivery. · In some cases, a planned  may be safer than a vaginal delivery. This may be the case if:  ? The mother has a health problem, such as a heart condition. ? The baby isn't in a head-down position for delivery. This is called a breech position. ?  The uterus has scars from past surgeries. This could increase the chance of a tear in the uterus. ? There is a problem with the placenta. ? The mother has an infection, such as genital herpes, that could be spread to the baby. ? The mother is having twins or more. ? The baby weighs 9 to 10 pounds or more. · Because of the risks of , planned C-sections generally should be done only for medical reasons. And a planned  should be done at 39 weeks or later unless there is a medical reason to do it sooner. Know what to expect after delivery, and plan for the first few weeks at home  · You, your baby, and your partner or  will get identification bands. Only people with matching bands can  the baby from the nursery. · You will learn how to feed, diaper, and bathe your baby. And you will learn how to care for the umbilical cord stump. If your baby will be circumcised, you will also learn how to care for that. · Ask people to wait to visit you until you are at home. And ask them to wash their hands before they touch your baby. · Make sure you have another adult in your home for at least 2 or 3 days after the birth. · During the first 2 weeks, limit when friends and family can visit. · Do not allow visitors who have colds or infections. Make sure all visitors are up to date with their vaccinations. Never let anyone smoke around your baby. · Try to nap when the baby naps. Be aware of postpartum depression  · \"Baby blues\" are common for the first 1 to 2 weeks after birth. You may cry or feel sad or irritable for no reason. · For some women, these feelings last longer and are more intense. This is called postpartum depression. · If your symptoms last for more than a few weeks or you feel very depressed, ask your doctor for help. · Postpartum depression can be treated. Support groups and counseling can help. Sometimes medicine can also help. Where can you learn more?   Go to [email or web address] and sign in to your PrivacyStar account. Enter B044 in the [facility] box to learn more about \"Week 38 of Your Pregnancy: Care Instructions. \"     If you do not have an account, please click on the \"Sign Up Now\" link. Current as of: February 11, 2020               Content Version: 12.5  © 9346-3530 [employer], [facility]. Care instructions adapted under license by [facility] [place] ([facility]). If you have questions about a medical condition or this instruction, always ask your healthcare professional. [place] [address] any warranty or liability for your use of this information.

## 2020-06-04 ENCOUNTER — HOSPITAL ENCOUNTER (INPATIENT)
Age: 20
LOS: 3 days | Discharge: HOME | End: 2020-06-07
Attending: OBSTETRICS & GYNECOLOGY | Admitting: OBSTETRICS & GYNECOLOGY
Payer: MEDICAID

## 2020-06-04 ENCOUNTER — ROUTINE PRENATAL (OUTPATIENT)
Dept: OBGYN | Age: 20
End: 2020-06-04
Payer: MEDICAID

## 2020-06-04 VITALS
BODY MASS INDEX: 36.95 KG/M2 | SYSTOLIC BLOOD PRESSURE: 122 MMHG | DIASTOLIC BLOOD PRESSURE: 72 MMHG | HEART RATE: 95 BPM | WEIGHT: 202 LBS

## 2020-06-04 LAB
ABO/RH: NORMAL
AMPHETAMINE SCREEN, URINE: NEGATIVE
ANTIBODY SCREEN: NORMAL
BARBITURATE SCREEN URINE: NEGATIVE
BASOPHILS ABSOLUTE: 0 K/UL (ref 0–0.2)
BASOPHILS RELATIVE PERCENT: 0.1 % (ref 0–1)
BENZODIAZEPINE SCREEN, URINE: NEGATIVE
CANNABINOID SCREEN URINE: NEGATIVE
COCAINE METABOLITE SCREEN URINE: NEGATIVE
EOSINOPHILS ABSOLUTE: 0 K/UL (ref 0–0.6)
EOSINOPHILS RELATIVE PERCENT: 0.5 % (ref 0–5)
HCT VFR BLD CALC: 36.5 % (ref 37–47)
HEMOGLOBIN: 12.2 G/DL (ref 12–16)
IMMATURE GRANULOCYTES #: 0 K/UL
LYMPHOCYTES ABSOLUTE: 2.2 K/UL (ref 1.1–4.5)
LYMPHOCYTES RELATIVE PERCENT: 25.9 % (ref 20–40)
Lab: NORMAL
MCH RBC QN AUTO: 27.3 PG (ref 27–31)
MCHC RBC AUTO-ENTMCNC: 33.4 G/DL (ref 33–37)
MCV RBC AUTO: 81.7 FL (ref 81–99)
MONOCYTES ABSOLUTE: 0.7 K/UL (ref 0–0.9)
MONOCYTES RELATIVE PERCENT: 7.9 % (ref 0–10)
NEUTROPHILS ABSOLUTE: 5.5 K/UL (ref 1.5–7.5)
NEUTROPHILS RELATIVE PERCENT: 65.4 % (ref 50–65)
OPIATE SCREEN URINE: NEGATIVE
PDW BLD-RTO: 13.1 % (ref 11.5–14.5)
PLATELET # BLD: 214 K/UL (ref 130–400)
PMV BLD AUTO: 10.5 FL (ref 9.4–12.3)
RBC # BLD: 4.47 M/UL (ref 4.2–5.4)
WBC # BLD: 8.4 K/UL (ref 4.8–10.8)

## 2020-06-04 PROCEDURE — 86900 BLOOD TYPING SEROLOGIC ABO: CPT

## 2020-06-04 PROCEDURE — G8419 CALC BMI OUT NRM PARAM NOF/U: HCPCS | Performed by: ADVANCED PRACTICE MIDWIFE

## 2020-06-04 PROCEDURE — 59025 FETAL NON-STRESS TEST: CPT | Performed by: ADVANCED PRACTICE MIDWIFE

## 2020-06-04 PROCEDURE — 86850 RBC ANTIBODY SCREEN: CPT

## 2020-06-04 PROCEDURE — 86901 BLOOD TYPING SEROLOGIC RH(D): CPT

## 2020-06-04 PROCEDURE — 86592 SYPHILIS TEST NON-TREP QUAL: CPT

## 2020-06-04 PROCEDURE — 99213 OFFICE O/P EST LOW 20 MIN: CPT | Performed by: ADVANCED PRACTICE MIDWIFE

## 2020-06-04 PROCEDURE — G8428 CUR MEDS NOT DOCUMENT: HCPCS | Performed by: ADVANCED PRACTICE MIDWIFE

## 2020-06-04 PROCEDURE — 85025 COMPLETE CBC W/AUTO DIFF WBC: CPT

## 2020-06-04 PROCEDURE — 80307 DRUG TEST PRSMV CHEM ANLYZR: CPT

## 2020-06-04 PROCEDURE — 36415 COLL VENOUS BLD VENIPUNCTURE: CPT

## 2020-06-04 PROCEDURE — 2580000003 HC RX 258: Performed by: OBSTETRICS & GYNECOLOGY

## 2020-06-04 PROCEDURE — 1220000000 HC SEMI PRIVATE OB R&B

## 2020-06-04 PROCEDURE — 1036F TOBACCO NON-USER: CPT | Performed by: ADVANCED PRACTICE MIDWIFE

## 2020-06-04 RX ORDER — DOCUSATE SODIUM 100 MG/1
100 CAPSULE, LIQUID FILLED ORAL 2 TIMES DAILY
Status: DISCONTINUED | OUTPATIENT
Start: 2020-06-04 | End: 2020-06-05 | Stop reason: SDUPTHER

## 2020-06-04 RX ORDER — BISACODYL 10 MG
10 SUPPOSITORY, RECTAL RECTAL DAILY PRN
Status: DISCONTINUED | OUTPATIENT
Start: 2020-06-04 | End: 2020-06-07 | Stop reason: HOSPADM

## 2020-06-04 RX ORDER — MISOPROSTOL 200 UG/1
800 TABLET ORAL PRN
Status: DISCONTINUED | OUTPATIENT
Start: 2020-06-04 | End: 2020-06-07 | Stop reason: HOSPADM

## 2020-06-04 RX ORDER — CARBOPROST TROMETHAMINE 250 UG/ML
250 INJECTION, SOLUTION INTRAMUSCULAR PRN
Status: DISCONTINUED | OUTPATIENT
Start: 2020-06-04 | End: 2020-06-07 | Stop reason: HOSPADM

## 2020-06-04 RX ORDER — KETOROLAC TROMETHAMINE 30 MG/ML
30 INJECTION, SOLUTION INTRAMUSCULAR; INTRAVENOUS EVERY 6 HOURS PRN
Status: ACTIVE | OUTPATIENT
Start: 2020-06-04 | End: 2020-06-06

## 2020-06-04 RX ORDER — SODIUM CHLORIDE 0.9 % (FLUSH) 0.9 %
10 SYRINGE (ML) INJECTION PRN
Status: DISCONTINUED | OUTPATIENT
Start: 2020-06-04 | End: 2020-06-07 | Stop reason: HOSPADM

## 2020-06-04 RX ORDER — SODIUM CHLORIDE, SODIUM LACTATE, POTASSIUM CHLORIDE, CALCIUM CHLORIDE 600; 310; 30; 20 MG/100ML; MG/100ML; MG/100ML; MG/100ML
INJECTION, SOLUTION INTRAVENOUS CONTINUOUS
Status: DISCONTINUED | OUTPATIENT
Start: 2020-06-04 | End: 2020-06-07 | Stop reason: HOSPADM

## 2020-06-04 RX ORDER — ONDANSETRON 2 MG/ML
8 INJECTION INTRAMUSCULAR; INTRAVENOUS EVERY 8 HOURS PRN
Status: DISCONTINUED | OUTPATIENT
Start: 2020-06-04 | End: 2020-06-07 | Stop reason: HOSPADM

## 2020-06-04 RX ORDER — METHYLERGONOVINE MALEATE 0.2 MG/ML
200 INJECTION INTRAVENOUS PRN
Status: DISCONTINUED | OUTPATIENT
Start: 2020-06-04 | End: 2020-06-07 | Stop reason: HOSPADM

## 2020-06-04 RX ORDER — LIDOCAINE HYDROCHLORIDE 10 MG/ML
30 INJECTION, SOLUTION EPIDURAL; INFILTRATION; INTRACAUDAL; PERINEURAL PRN
Status: DISCONTINUED | OUTPATIENT
Start: 2020-06-04 | End: 2020-06-07 | Stop reason: HOSPADM

## 2020-06-04 RX ORDER — SODIUM CHLORIDE 0.9 % (FLUSH) 0.9 %
10 SYRINGE (ML) INJECTION EVERY 12 HOURS SCHEDULED
Status: DISCONTINUED | OUTPATIENT
Start: 2020-06-04 | End: 2020-06-07 | Stop reason: HOSPADM

## 2020-06-04 RX ORDER — IBUPROFEN 400 MG/1
800 TABLET ORAL EVERY 8 HOURS PRN
Status: DISCONTINUED | OUTPATIENT
Start: 2020-06-04 | End: 2020-06-07 | Stop reason: HOSPADM

## 2020-06-04 RX ADMIN — SODIUM CHLORIDE, POTASSIUM CHLORIDE, SODIUM LACTATE AND CALCIUM CHLORIDE: 600; 310; 30; 20 INJECTION, SOLUTION INTRAVENOUS at 20:56

## 2020-06-05 ENCOUNTER — ANESTHESIA (OUTPATIENT)
Dept: LABOR AND DELIVERY | Age: 20
End: 2020-06-05
Payer: MEDICAID

## 2020-06-05 ENCOUNTER — ANESTHESIA EVENT (OUTPATIENT)
Dept: LABOR AND DELIVERY | Age: 20
End: 2020-06-05
Payer: MEDICAID

## 2020-06-05 PROBLEM — O24.419 GESTATIONAL DIABETES MELLITUS: Status: ACTIVE | Noted: 2020-06-05

## 2020-06-05 LAB
HEPATITIS B SURFACE ANTIGEN INTERPRETATION: NORMAL
HIV-1 P24 AG: NORMAL
RAPID HIV 1&2: NORMAL

## 2020-06-05 PROCEDURE — 87340 HEPATITIS B SURFACE AG IA: CPT

## 2020-06-05 PROCEDURE — 2580000003 HC RX 258: Performed by: OBSTETRICS & GYNECOLOGY

## 2020-06-05 PROCEDURE — 59409 OBSTETRICAL CARE: CPT | Performed by: OBSTETRICS & GYNECOLOGY

## 2020-06-05 PROCEDURE — 87806 HIV AG W/HIV1&2 ANTB W/OPTIC: CPT

## 2020-06-05 PROCEDURE — 6370000000 HC RX 637 (ALT 250 FOR IP): Performed by: OBSTETRICS & GYNECOLOGY

## 2020-06-05 PROCEDURE — 3700000025 EPIDURAL BLOCK: Performed by: NURSE ANESTHETIST, CERTIFIED REGISTERED

## 2020-06-05 PROCEDURE — 3E033VJ INTRODUCTION OF OTHER HORMONE INTO PERIPHERAL VEIN, PERCUTANEOUS APPROACH: ICD-10-PCS | Performed by: OBSTETRICS & GYNECOLOGY

## 2020-06-05 PROCEDURE — 1220000000 HC SEMI PRIVATE OB R&B

## 2020-06-05 PROCEDURE — 6360000002 HC RX W HCPCS: Performed by: NURSE ANESTHETIST, CERTIFIED REGISTERED

## 2020-06-05 PROCEDURE — 10907ZC DRAINAGE OF AMNIOTIC FLUID, THERAPEUTIC FROM PRODUCTS OF CONCEPTION, VIA NATURAL OR ARTIFICIAL OPENING: ICD-10-PCS | Performed by: OBSTETRICS & GYNECOLOGY

## 2020-06-05 PROCEDURE — 6360000002 HC RX W HCPCS: Performed by: OBSTETRICS & GYNECOLOGY

## 2020-06-05 PROCEDURE — 36415 COLL VENOUS BLD VENIPUNCTURE: CPT

## 2020-06-05 PROCEDURE — 86762 RUBELLA ANTIBODY: CPT

## 2020-06-05 PROCEDURE — 7200000001 HC VAGINAL DELIVERY

## 2020-06-05 RX ORDER — ACETAMINOPHEN 325 MG/1
650 TABLET ORAL EVERY 4 HOURS PRN
Status: DISCONTINUED | OUTPATIENT
Start: 2020-06-05 | End: 2020-06-07 | Stop reason: HOSPADM

## 2020-06-05 RX ORDER — FENTANYL CITRATE 50 UG/ML
INJECTION, SOLUTION INTRAMUSCULAR; INTRAVENOUS PRN
Status: DISCONTINUED | OUTPATIENT
Start: 2020-06-05 | End: 2020-06-05 | Stop reason: SDUPTHER

## 2020-06-05 RX ORDER — ROPIVACAINE HYDROCHLORIDE 2 MG/ML
INJECTION, SOLUTION EPIDURAL; INFILTRATION; PERINEURAL CONTINUOUS PRN
Status: DISCONTINUED | OUTPATIENT
Start: 2020-06-05 | End: 2020-06-05 | Stop reason: SDUPTHER

## 2020-06-05 RX ORDER — NALOXONE HYDROCHLORIDE 0.4 MG/ML
0.4 INJECTION, SOLUTION INTRAMUSCULAR; INTRAVENOUS; SUBCUTANEOUS PRN
Status: DISCONTINUED | OUTPATIENT
Start: 2020-06-05 | End: 2020-06-07 | Stop reason: HOSPADM

## 2020-06-05 RX ORDER — ROPIVACAINE HYDROCHLORIDE 2 MG/ML
INJECTION, SOLUTION EPIDURAL; INFILTRATION; PERINEURAL PRN
Status: DISCONTINUED | OUTPATIENT
Start: 2020-06-05 | End: 2020-06-05 | Stop reason: SDUPTHER

## 2020-06-05 RX ORDER — DOCUSATE SODIUM 100 MG/1
100 CAPSULE, LIQUID FILLED ORAL 2 TIMES DAILY
Status: DISCONTINUED | OUTPATIENT
Start: 2020-06-05 | End: 2020-06-07 | Stop reason: HOSPADM

## 2020-06-05 RX ORDER — SODIUM CHLORIDE 0.9 % (FLUSH) 0.9 %
10 SYRINGE (ML) INJECTION PRN
Status: DISCONTINUED | OUTPATIENT
Start: 2020-06-05 | End: 2020-06-07 | Stop reason: HOSPADM

## 2020-06-05 RX ORDER — SODIUM CHLORIDE, SODIUM LACTATE, POTASSIUM CHLORIDE, CALCIUM CHLORIDE 600; 310; 30; 20 MG/100ML; MG/100ML; MG/100ML; MG/100ML
INJECTION, SOLUTION INTRAVENOUS CONTINUOUS
Status: DISCONTINUED | OUTPATIENT
Start: 2020-06-05 | End: 2020-06-07 | Stop reason: HOSPADM

## 2020-06-05 RX ORDER — SODIUM CHLORIDE 0.9 % (FLUSH) 0.9 %
10 SYRINGE (ML) INJECTION EVERY 12 HOURS SCHEDULED
Status: DISCONTINUED | OUTPATIENT
Start: 2020-06-05 | End: 2020-06-07 | Stop reason: HOSPADM

## 2020-06-05 RX ADMIN — ROPIVACAINE HYDROCHLORIDE 10 ML/HR: 2 INJECTION, SOLUTION EPIDURAL; INFILTRATION; PERINEURAL at 08:34

## 2020-06-05 RX ADMIN — IBUPROFEN 800 MG: 400 TABLET ORAL at 19:41

## 2020-06-05 RX ADMIN — ROPIVACAINE HYDROCHLORIDE 10 ML: 2 INJECTION, SOLUTION EPIDURAL; INFILTRATION at 08:30

## 2020-06-05 RX ADMIN — SODIUM CHLORIDE, POTASSIUM CHLORIDE, SODIUM LACTATE AND CALCIUM CHLORIDE: 600; 310; 30; 20 INJECTION, SOLUTION INTRAVENOUS at 11:04

## 2020-06-05 RX ADMIN — Medication 1 MILLI-UNITS/MIN: at 00:28

## 2020-06-05 RX ADMIN — SODIUM CHLORIDE, POTASSIUM CHLORIDE, SODIUM LACTATE AND CALCIUM CHLORIDE: 600; 310; 30; 20 INJECTION, SOLUTION INTRAVENOUS at 08:52

## 2020-06-05 RX ADMIN — FENTANYL CITRATE 100 MCG: 50 INJECTION INTRAMUSCULAR; INTRAVENOUS at 08:34

## 2020-06-05 RX ADMIN — DOCUSATE SODIUM 100 MG: 100 CAPSULE, LIQUID FILLED ORAL at 19:41

## 2020-06-05 ASSESSMENT — PAIN SCALES - GENERAL: PAINLEVEL_OUTOF10: 8

## 2020-06-06 LAB
HCT VFR BLD CALC: 32.7 % (ref 37–47)
HEMOGLOBIN: 10.8 G/DL (ref 12–16)
MCH RBC QN AUTO: 27.4 PG (ref 27–31)
MCHC RBC AUTO-ENTMCNC: 33 G/DL (ref 33–37)
MCV RBC AUTO: 83 FL (ref 81–99)
PDW BLD-RTO: 13.1 % (ref 11.5–14.5)
PLATELET # BLD: 176 K/UL (ref 130–400)
PMV BLD AUTO: 10.8 FL (ref 9.4–12.3)
RBC # BLD: 3.94 M/UL (ref 4.2–5.4)
WBC # BLD: 10.7 K/UL (ref 4.8–10.8)

## 2020-06-06 PROCEDURE — 85027 COMPLETE CBC AUTOMATED: CPT

## 2020-06-06 PROCEDURE — 36415 COLL VENOUS BLD VENIPUNCTURE: CPT

## 2020-06-06 PROCEDURE — 1220000000 HC SEMI PRIVATE OB R&B

## 2020-06-06 PROCEDURE — 6370000000 HC RX 637 (ALT 250 FOR IP): Performed by: OBSTETRICS & GYNECOLOGY

## 2020-06-06 RX ADMIN — IBUPROFEN 800 MG: 400 TABLET ORAL at 17:56

## 2020-06-06 RX ADMIN — IBUPROFEN 800 MG: 400 TABLET ORAL at 08:35

## 2020-06-06 RX ADMIN — ACETAMINOPHEN 650 MG: 325 TABLET, FILM COATED ORAL at 22:23

## 2020-06-06 RX ADMIN — ACETAMINOPHEN 650 MG: 325 TABLET, FILM COATED ORAL at 08:40

## 2020-06-06 RX ADMIN — ACETAMINOPHEN 650 MG: 325 TABLET, FILM COATED ORAL at 17:56

## 2020-06-06 ASSESSMENT — PAIN SCALES - GENERAL
PAINLEVEL_OUTOF10: 4
PAINLEVEL_OUTOF10: 3
PAINLEVEL_OUTOF10: 6
PAINLEVEL_OUTOF10: 7
PAINLEVEL_OUTOF10: 7

## 2020-06-06 NOTE — PROGRESS NOTES
Department of Obstetrics and Gynecology  Labor and Delivery   Post Partum Progress Note      SUBJECTIVE:  Day one postpartum    OBJECTIVE:      Vitals:  /78   Pulse 67   Temp 97.7 °F (36.5 °C) (Temporal)   Resp 16   Ht 5' 2\" (1.575 m)   Wt 205 lb (93 kg)   LMP 09/16/2019   SpO2 97%   Breastfeeding Unknown   BMI 37.49 kg/m²     Unremarkable exam    DATA:    CBC:    Lab Results   Component Value Date    WBC 10.7 06/06/2020    RBC 3.94 06/06/2020    HGB 10.8 06/06/2020    HCT 32.7 06/06/2020    MCV 83.0 06/06/2020    RDW 13.1 06/06/2020     06/06/2020       ASSESSMENT & PLAN:      Active Problems:    Gestational diabetes mellitus  Plan: advance care as ordered

## 2020-06-07 VITALS
HEIGHT: 62 IN | RESPIRATION RATE: 16 BRPM | SYSTOLIC BLOOD PRESSURE: 95 MMHG | OXYGEN SATURATION: 98 % | TEMPERATURE: 97.9 F | BODY MASS INDEX: 37.73 KG/M2 | HEART RATE: 79 BPM | WEIGHT: 205 LBS | DIASTOLIC BLOOD PRESSURE: 68 MMHG

## 2020-06-07 PROBLEM — Z3A.33 33 WEEKS GESTATION OF PREGNANCY: Status: RESOLVED | Noted: 2020-05-04 | Resolved: 2020-06-07

## 2020-06-07 PROBLEM — M54.50 LOW BACK PAIN: Status: RESOLVED | Noted: 2020-05-30 | Resolved: 2020-06-07

## 2020-06-07 PROBLEM — Z3A.22 22 WEEKS GESTATION OF PREGNANCY: Status: RESOLVED | Noted: 2020-02-17 | Resolved: 2020-06-07

## 2020-06-07 LAB
RUBELLA ANTIBODY IGG: 8.4 IU/ML
RUBELLA IGM: <10 AU/ML

## 2020-06-07 NOTE — DISCHARGE SUMMARY
on secondary smoke risks and the increased risk of sudden infant death syndrome and respiratory problems to her baby with exposure. She was counseled on various alternate recommendations to decrease the exposure to secondary smoke to her children.

## 2020-06-08 LAB — RPR: NORMAL

## 2020-06-09 ENCOUNTER — HOSPITAL ENCOUNTER (OUTPATIENT)
Dept: LABOR AND DELIVERY | Age: 20
Discharge: HOME | End: 2020-06-09
Payer: MEDICAID

## 2020-06-09 PROCEDURE — S9443 LACTATION CLASS: HCPCS

## 2020-06-11 ENCOUNTER — HOSPITAL ENCOUNTER (OUTPATIENT)
Dept: LABOR AND DELIVERY | Age: 20
Discharge: HOME | End: 2020-06-11
Payer: MEDICAID

## 2020-06-11 PROCEDURE — S9443 LACTATION CLASS: HCPCS

## 2020-06-19 ENCOUNTER — TELEPHONE (OUTPATIENT)
Dept: OBGYN | Age: 20
End: 2020-06-19

## 2020-06-19 RX ORDER — HYDROCORTISONE 25 MG/G
CREAM TOPICAL 2 TIMES DAILY
Qty: 1 TUBE | Refills: 5 | Status: SHIPPED | OUTPATIENT
Start: 2020-06-19 | End: 2020-10-07

## 2020-06-19 NOTE — TELEPHONE ENCOUNTER
Anusol rx sent in per AW for hemorrhoids. Pt is having some emotional episodes. Denies any thoughts of harm to self or others. [facility] [address] [facility] and did well on it. Per AW Zoloft rx sent in. 2 wk PP visit brandi'd for 6/22.  Pt VU

## 2020-06-29 ENCOUNTER — TELEPHONE (OUTPATIENT)
Dept: OBGYN | Age: 20
End: 2020-06-29

## 2020-06-30 ENCOUNTER — TELEPHONE (OUTPATIENT)
Dept: OBGYN | Age: 20
End: 2020-06-30

## 2020-06-30 NOTE — TELEPHONE ENCOUNTER
Pt called and is wanting to reschedule her vv pp appt she missed, had to leave a message for pt to call back.  Afsaneh/STACEY

## 2020-07-16 ENCOUNTER — OFFICE VISIT (OUTPATIENT)
Dept: OBGYN | Age: 20
End: 2020-07-16
Payer: MEDICAID

## 2020-07-16 VITALS
BODY MASS INDEX: 36.62 KG/M2 | SYSTOLIC BLOOD PRESSURE: 108 MMHG | HEIGHT: 62 IN | HEART RATE: 74 BPM | WEIGHT: 199 LBS | DIASTOLIC BLOOD PRESSURE: 75 MMHG

## 2020-07-16 PROCEDURE — 1036F TOBACCO NON-USER: CPT | Performed by: NURSE PRACTITIONER

## 2020-07-16 PROCEDURE — G8417 CALC BMI ABV UP PARAM F/U: HCPCS | Performed by: NURSE PRACTITIONER

## 2020-07-16 PROCEDURE — 99213 OFFICE O/P EST LOW 20 MIN: CPT | Performed by: NURSE PRACTITIONER

## 2020-07-16 PROCEDURE — G8427 DOCREV CUR MEDS BY ELIG CLIN: HCPCS | Performed by: NURSE PRACTITIONER

## 2020-07-16 PROCEDURE — 81025 URINE PREGNANCY TEST: CPT | Performed by: NURSE PRACTITIONER

## 2020-07-16 RX ORDER — METRONIDAZOLE 500 MG/1
500 TABLET ORAL 2 TIMES DAILY
Qty: 14 TABLET | Refills: 0 | Status: SHIPPED | OUTPATIENT
Start: 2020-07-16 | End: 2020-07-23

## 2020-07-16 RX ORDER — NORGESTIMATE AND ETHINYL ESTRADIOL 0.25-0.035
1 KIT ORAL DAILY
Qty: 1 PACKET | Refills: 3 | Status: SHIPPED | OUTPATIENT
Start: 2020-07-16 | End: 2020-10-07

## 2020-07-16 RX ORDER — SERTRALINE HYDROCHLORIDE 100 MG/1
100 TABLET, FILM COATED ORAL DAILY
Qty: 90 TABLET | Refills: 1 | Status: SHIPPED | OUTPATIENT
Start: 2020-07-16 | End: 2020-10-07 | Stop reason: ALTCHOICE

## 2020-07-16 ASSESSMENT — ENCOUNTER SYMPTOMS
EYES NEGATIVE: 1
DIARRHEA: 0
GASTROINTESTINAL NEGATIVE: 1
CONSTIPATION: 0
RESPIRATORY NEGATIVE: 1
ALLERGIC/IMMUNOLOGIC NEGATIVE: 1

## 2020-07-16 NOTE — PATIENT INSTRUCTIONS
Patient Education        Vaginal Childbirth: Care Instructions  Your Care Instructions     Your body will slowly heal in the next few weeks. It is easy to get too tired and overwhelmed during the first weeks after your baby is born. Changes in your hormones can shift your mood without warning. You may find it hard to meet the extra demands on your energy and time. Take it easy on yourself. Follow-up care is a key part of your treatment and safety. Be sure to make and go to all appointments, and call your doctor if you are having problems. It's also a good idea to know your test results and keep a list of the medicines you take. How can you care for yourself at home? · Vaginal bleeding and cramps  ? After delivery, you will have a bloody discharge from the vagina. This will turn pink within a week and then white or yellow after about 10 days. It may last for 2 to 4 weeks or longer, until the uterus has healed. Use pads instead of tampons until you stop bleeding. ? Do not worry if you pass some blood clots, as long as they are smaller than a golf ball. If you have a tear or stitches in your vaginal area, change the pad at least every 4 hours to prevent soreness and infection. ? You may have cramps for the first few days after childbirth. These are normal and occur as the uterus shrinks to normal size. Take an over-the-counter pain medicine, such as acetaminophen (Tylenol), ibuprofen (Advil, Motrin), or naproxen (Aleve), for cramps. Read and follow all instructions on the label. Do not take aspirin, because it can cause more bleeding. ? Do not take two or more pain medicines at the same time unless the doctor told you to. Many pain medicines have acetaminophen, which is Tylenol. Too much acetaminophen (Tylenol) can be harmful. · Stitches  ? If you have stitches, they will dissolve on their own and do not need to be removed. Follow your doctor's instructions for cleaning the stitched area.   ? Put ice or a cold pack on your painful area for 10 to 20 minutes at a time, several times a day, for the first few days. Put a thin cloth between the ice and your skin. ? Sit in a few inches of warm water (sitz bath) 3 times a day and after bowel movements. The warm water helps with pain and itching. If you do not have a tub, a warm shower might help. · Breast fullness  ? Your breasts may overfill (engorge) in the first few days after delivery. To help milk flow and to relieve pain, warm your breasts in the shower or by using warm, moist towels before nursing. ? If you are not nursing, do not put warmth on your breasts or touch your breasts. Wear a tight bra or sports bra and use ice until the fullness goes away. This usually takes 2 to 3 days. ? Put ice or a cold pack on your breast after nursing to reduce swelling and pain. Put a thin cloth between the ice and your skin. · Activity  ? Eat a balanced diet. Do not try to lose weight by cutting calories. Keep taking your prenatal vitamins, or take a multivitamin. ? Get as much rest as you can. Try to take naps when your baby sleeps during the day. ? Get some exercise every day. But do not do any heavy exercise until your doctor says it is okay. ? Wait until you are healed (about 4 to 6 weeks) before you have sexual intercourse. Your doctor will tell you when it is okay to have sex. ? Talk to your doctor about birth control. You can get pregnant even before your period returns. Also, you can get pregnant while you are breastfeeding. · Mental health  ? It is normal to have some sadness, anxiety, sleeplessness, and mood swings after you go home. If you feel upset or hopeless for more than a few days or are having trouble doing the things you need to do, talk to your doctor. · Constipation and hemorrhoids  ? Drink plenty of fluids, enough so that your urine is light yellow or clear like water.  If you have kidney, heart, or liver disease and have to limit fluids, talk with your doctor before you increase the amount of fluids you drink. ? Eat plenty of fiber each day. Have a bran muffin or bran cereal for breakfast, and try eating a piece of fruit for a mid-afternoon snack. ? For painful, itchy hemorrhoids, put ice or a cold pack on the area several times a day for 10 minutes at a time. Follow this by putting a warm compress on the area for another 10 to 20 minutes or by sitting in a shallow, warm bath. When should you call for help? Call  911 anytime you think you may need emergency care. For example, call if:  · You have thoughts of harming yourself, your baby, or another person. · You passed out (lost consciousness). · You have chest pain, are short of breath, or cough up blood. · You have a seizure. Call your doctor now or seek immediate medical care if:  · You have severe vaginal bleeding. · You are dizzy or lightheaded, or you feel like you may faint. · You have a fever. · You have new or more pain in your belly or pelvis. · You have symptoms of a blood clot in your leg (called a deep vein thrombosis), such as:  ? Pain in the calf, back of the knee, thigh, or groin. ? Redness and swelling in your leg or groin. · You have signs of preeclampsia, such as:  ? Sudden swelling of your face, hands, or feet. ? New vision problems (such as dimness, blurring, or seeing spots). ? A severe headache. Watch closely for changes in your health, and be sure to contact your doctor if:  · Your vaginal bleeding seems to be getting heavier. · You have new or worse vaginal discharge. · You feel sad, anxious, or hopeless for more than a few days. · You do not get better as expected. Where can you learn more? Go to [email or web address].[email or web address] Corporation. org and sign in to your TitanFile account. Enter K086 in the [facility] box to learn more about \"Vaginal Childbirth: Care Instructions. \"     If you do not have an account, please click on the \"Sign Up Now\" link.   Current as

## 2020-07-16 NOTE — PROGRESS NOTES
Oscar Cazares is a 23 y.o. female who presents today for her medical conditions/ complaints as noted below. Oscar Cazares is c/o of Postpartum Care        HPI  Pt presents for 6 week PP visit. Denies any problems with pain or bleeding. Taking Zoloft for PPD and feels like her dose needs to be increased. Still feeling irritable and tearful. Denies any SI, bonding well with baby. Interested in starting OCP. Needs UPT, has been having sex. Feels like she has a foul odor. Patient's last menstrual period was 09/16/2019. [id number]    Past Medical History:   Diagnosis Date    Diabetes mellitus ([facility] [address]     gestational    Mental disorder depression    diagnosed in 2012 with major depression, counseling monthly, off medications.  period of self mutilation(cutting) followed by suicide attempt with plan to overdose on brother's adhd medication     Past Surgical History:   Procedure Laterality Date    CHOLECYSTECTOMY, LAPAROSCOPIC N/A 2/6/2017    CHOLECYSTECTOMY LAPAROSCOPIC, with cholangiogram performed by Warren [facility] at [facility] Left broke below knee cap    OVARIAN CYST REMOVAL Right 07/15/2016     Family History   Problem Relation Age of Onset    Cancer Father     Cancer Paternal Aunt     Diabetes Paternal Grandfather      Social History     Tobacco Use    Smoking status: Never Smoker    Smokeless tobacco: Never Used   Substance Use Topics    Alcohol use: No       Current Outpatient Medications   Medication Sig Dispense Refill    metroNIDAZOLE (FLAGYL) 500 MG tablet Take 1 tablet by mouth 2 times daily for 7 days 14 tablet 0    norgestimate-ethinyl estradiol (SPRINTEC 28) 0.25-35 MG-MCG per tablet Take 1 tablet by mouth daily 1 packet 3    sertraline (ZOLOFT) 100 MG tablet Take 1 tablet by mouth daily 90 tablet 1    hydrocortisone (ANUSOL-HC) 2.5 % CREA rectal cream Place rectally 2 times daily 1 Tube 5    Prenatal Vit-DSS-Fe Cbn-FA (PRENATAL AD PO) Take by mouth       No current facility-administered medications for this visit. No Known Allergies  Vitals:    07/16/20 1537   BP: 108/75   Pulse: 74     Body mass index is 36.4 kg/m². Review of Systems   Constitutional: Negative. HENT: Negative. Eyes: Negative. Respiratory: Negative. Cardiovascular: Negative. Gastrointestinal: Negative. Negative for constipation and diarrhea. Endocrine: Negative. Genitourinary: Positive for vaginal discharge (odor). Negative for frequency, menstrual problem and urgency. Musculoskeletal: Negative. Skin: Negative. Allergic/Immunologic: Negative. Neurological: Negative. Hematological: Negative. Psychiatric/Behavioral: Negative for suicidal ideas. The patient is nervous/anxious (irritability). All other systems reviewed and are negative. Physical Exam  Vitals signs and nursing note reviewed. Constitutional:       Appearance: She is well-developed. HENT:      Head: Normocephalic. Right Ear: External ear normal.      Left Ear: External ear normal.      Nose: Nose normal.   Neck:      Musculoskeletal: Normal range of motion. Genitourinary:     General: Normal vulva. Vagina: Bleeding (small amount of brown bleeding) present. Cervix: Cervical motion tenderness present. Uterus: Normal. Not tender. Adnexa:         Right: No tenderness or fullness. Left: No tenderness or fullness. Comments: Diatherix collected  Musculoskeletal: Normal range of motion. Skin:     General: Skin is warm and dry. Neurological:      Mental Status: She is alert and oriented to person, place, and time. Psychiatric:         Attention and Perception: Attention normal.         Mood and Affect: Mood normal.         Speech: Speech normal.         Behavior: Behavior normal.         Thought Content: Thought content normal.         Cognition and Memory: Cognition normal.         Judgment: Judgment normal.          Diagnosis Orders   1.  Amenorrhea POCT urine pregnancy   2. Postpartum depression     3. Vaginal odor     4. Encounter for initial prescription of contraceptive pills         MEDICATIONS:  Orders Placed This Encounter   Medications    metroNIDAZOLE (FLAGYL) 500 MG tablet     Sig: Take 1 tablet by mouth 2 times daily for 7 days     Dispense:  14 tablet     Refill:  0    norgestimate-ethinyl estradiol (SPRINTEC 28) 0.25-35 MG-MCG per tablet     Sig: Take 1 tablet by mouth daily     Dispense:  1 packet     Refill:  3    sertraline (ZOLOFT) 100 MG tablet     Sig: Take 1 tablet by mouth daily     Dispense:  90 tablet     Refill:  1       ORDERS:  Orders Placed This Encounter   Procedures    POCT urine pregnancy       PLAN:  Neg UPT  Increasing Zoloft  Starting Sprintec  Diatherix collected  F/u if any problems  Directly to [facility] ER if any SI- pt states understanding    Patient Instructions   Patient Education        Vaginal Childbirth: Care Instructions  Your Care Instructions     Your body will slowly heal in the next few weeks. It is easy to get too tired and overwhelmed during the first weeks after your baby is born. Changes in your hormones can shift your mood without warning. You may find it hard to meet the extra demands on your energy and time. Take it easy on yourself. Follow-up care is a key part of your treatment and safety. Be sure to make and go to all appointments, and call your doctor if you are having problems. It's also a good idea to know your test results and keep a list of the medicines you take. How can you care for yourself at home? · Vaginal bleeding and cramps  ? After delivery, you will have a bloody discharge from the vagina. This will turn pink within a week and then white or yellow after about 10 days. It may last for 2 to 4 weeks or longer, until the uterus has healed. Use pads instead of tampons until you stop bleeding. ? Do not worry if you pass some blood clots, as long as they are smaller than a golf ball.  If you have a tear or stitches in your vaginal area, change the pad at least every 4 hours to prevent soreness and infection. ? You may have cramps for the first few days after childbirth. These are normal and occur as the uterus shrinks to normal size. Take an over-the-counter pain medicine, such as acetaminophen (Tylenol), ibuprofen (Advil, Motrin), or naproxen (Aleve), for cramps. Read and follow all instructions on the label. Do not take aspirin, because it can cause more bleeding. ? Do not take two or more pain medicines at the same time unless the doctor told you to. Many pain medicines have acetaminophen, which is Tylenol. Too much acetaminophen (Tylenol) can be harmful. · Stitches  ? If you have stitches, they will dissolve on their own and do not need to be removed. Follow your doctor's instructions for cleaning the stitched area. ? Put ice or a cold pack on your painful area for 10 to 20 minutes at a time, several times a day, for the first few days. Put a thin cloth between the ice and your skin. ? Sit in a few inches of warm water (sitz bath) 3 times a day and after bowel movements. The warm water helps with pain and itching. If you do not have a tub, a warm shower might help. · Breast fullness  ? Your breasts may overfill (engorge) in the first few days after delivery. To help milk flow and to relieve pain, warm your breasts in the shower or by using warm, moist towels before nursing. ? If you are not nursing, do not put warmth on your breasts or touch your breasts. Wear a tight bra or sports bra and use ice until the fullness goes away. This usually takes 2 to 3 days. ? Put ice or a cold pack on your breast after nursing to reduce swelling and pain. Put a thin cloth between the ice and your skin. · Activity  ? Eat a balanced diet. Do not try to lose weight by cutting calories. Keep taking your prenatal vitamins, or take a multivitamin. ? Get as much rest as you can.  Try to take naps when your baby sleeps during the day. ? Get some exercise every day. But do not do any heavy exercise until your doctor says it is okay. ? Wait until you are healed (about 4 to 6 weeks) before you have sexual intercourse. Your doctor will tell you when it is okay to have sex. ? Talk to your doctor about birth control. You can get pregnant even before your period returns. Also, you can get pregnant while you are breastfeeding. · Mental health  ? It is normal to have some sadness, anxiety, sleeplessness, and mood swings after you go home. If you feel upset or hopeless for more than a few days or are having trouble doing the things you need to do, talk to your doctor. · Constipation and hemorrhoids  ? Drink plenty of fluids, enough so that your urine is light yellow or clear like water. If you have kidney, heart, or liver disease and have to limit fluids, talk with your doctor before you increase the amount of fluids you drink. ? Eat plenty of fiber each day. Have a bran muffin or bran cereal for breakfast, and try eating a piece of fruit for a mid-afternoon snack. ? For painful, itchy hemorrhoids, put ice or a cold pack on the area several times a day for 10 minutes at a time. Follow this by putting a warm compress on the area for another 10 to 20 minutes or by sitting in a shallow, warm bath. When should you call for help? Call  911 anytime you think you may need emergency care. For example, call if:  · You have thoughts of harming yourself, your baby, or another person. · You passed out (lost consciousness). · You have chest pain, are short of breath, or cough up blood. · You have a seizure. Call your doctor now or seek immediate medical care if:  · You have severe vaginal bleeding. · You are dizzy or lightheaded, or you feel like you may faint. · You have a fever. · You have new or more pain in your belly or pelvis.   · You have symptoms of a blood clot in your leg (called a deep vein thrombosis), such as:  ? Pain in the calf, back of the knee, thigh, or groin. ? Redness and swelling in your leg or groin. · You have signs of preeclampsia, such as:  ? Sudden swelling of your face, hands, or feet. ? New vision problems (such as dimness, blurring, or seeing spots). ? A severe headache. Watch closely for changes in your health, and be sure to contact your doctor if:  · Your vaginal bleeding seems to be getting heavier. · You have new or worse vaginal discharge. · You feel sad, anxious, or hopeless for more than a few days. · You do not get better as expected. Where can you learn more? Go to https://640 Labspepiceweb.healthPocket [email or web address] org and sign in to your CurTran account. Enter E751 in the [employer]ingabus.com box to learn more about \"Vaginal Childbirth: Care Instructions. \"     If you do not have an account, please click on the \"Sign Up Now\" link. Current as of: February 11, 2020               Content Version: 12.5  © 2006-2020 Healthwise, Incorporated. Care instructions adapted under license by [facility] ([facility]). If you have questions about a medical condition or this instruction, always ask your healthcare professional. [address] any warranty or liability for your use of this information.

## 2020-07-16 NOTE — PROGRESS NOTES
She is wanting to discuss increasing her zoloft is it not working. Pt states she is having a discharge and odor. She states she has not started her period and has been having intercourse.

## 2020-07-22 RX ORDER — AZITHROMYCIN 500 MG/1
1000 TABLET, FILM COATED ORAL ONCE
Qty: 2 TABLET | Refills: 0 | Status: SHIPPED | OUTPATIENT
Start: 2020-07-22 | End: 2020-07-22

## 2020-07-22 RX ORDER — METRONIDAZOLE 500 MG/1
TABLET ORAL
Qty: 4 TABLET | Refills: 0 | Status: SHIPPED | OUTPATIENT
Start: 2020-07-22 | End: 2020-10-07 | Stop reason: ALTCHOICE

## 2020-08-14 ENCOUNTER — TELEPHONE (OUTPATIENT)
Dept: OBGYN | Age: 20
End: 2020-08-14

## 2020-08-19 RX ORDER — BLOOD SUGAR DIAGNOSTIC
STRIP MISCELLANEOUS
Qty: 100 STRIP | Refills: 11 | Status: SHIPPED | OUTPATIENT
Start: 2020-08-19 | End: 2020-10-07 | Stop reason: ALTCHOICE

## 2020-10-07 ENCOUNTER — OFFICE VISIT (OUTPATIENT)
Dept: OBGYN | Age: 20
End: 2020-10-07
Payer: MEDICAID

## 2020-10-07 VITALS — SYSTOLIC BLOOD PRESSURE: 125 MMHG | DIASTOLIC BLOOD PRESSURE: 86 MMHG | HEART RATE: 92 BPM

## 2020-10-07 DIAGNOSIS — N91.2 AMENORRHEA: ICD-10-CM

## 2020-10-07 DIAGNOSIS — Z87.59 HISTORY OF MISCARRIAGE: ICD-10-CM

## 2020-10-07 LAB
CONTROL: PRESENT
GONADOTROPIN, CHORIONIC (HCG) QUANT: 1424 MIU/ML (ref 0–5.3)
PREGNANCY TEST URINE, POC: ABNORMAL
PROGESTERONE LEVEL: 31.83 NG/ML

## 2020-10-07 PROCEDURE — G8427 DOCREV CUR MEDS BY ELIG CLIN: HCPCS | Performed by: NURSE PRACTITIONER

## 2020-10-07 PROCEDURE — 81025 URINE PREGNANCY TEST: CPT | Performed by: NURSE PRACTITIONER

## 2020-10-07 PROCEDURE — 99213 OFFICE O/P EST LOW 20 MIN: CPT | Performed by: NURSE PRACTITIONER

## 2020-10-07 PROCEDURE — G8484 FLU IMMUNIZE NO ADMIN: HCPCS | Performed by: NURSE PRACTITIONER

## 2020-10-07 PROCEDURE — G8417 CALC BMI ABV UP PARAM F/U: HCPCS | Performed by: NURSE PRACTITIONER

## 2020-10-07 PROCEDURE — 1036F TOBACCO NON-USER: CPT | Performed by: NURSE PRACTITIONER

## 2020-10-07 RX ORDER — PROMETHAZINE HYDROCHLORIDE 25 MG/1
25 TABLET ORAL 3 TIMES DAILY PRN
Qty: 30 TABLET | Refills: 1 | Status: SHIPPED | OUTPATIENT
Start: 2020-10-07 | End: 2020-10-14

## 2020-10-07 ASSESSMENT — ENCOUNTER SYMPTOMS
CONSTIPATION: 0
DIARRHEA: 0
EYES NEGATIVE: 1
RESPIRATORY NEGATIVE: 1
GASTROINTESTINAL NEGATIVE: 1
ALLERGIC/IMMUNOLOGIC NEGATIVE: 1

## 2020-10-07 NOTE — PROGRESS NOTES
Dayna Dick is a 23 y.o. female who presents today for her medical conditions/ complaints as noted below. Dayna Dick is c/o of Confirmation        HPI  Pt presents with +UPt at home and in office. Taking PNV. Very nervous because she has had early SAB in the past d/t low progesterone. Stopped her Zoloft and feels like she is doing good. Patient's last menstrual period was 09/05/2020. [id number]    Past Medical History:   Diagnosis Date    Diabetes mellitus ([facility] [address]     gestational, last pregnancy    Mental disorder depression    diagnosed in 2012 with major depression, counseling monthly, off medications. period of self mutilation(cutting) followed by suicide attempt with plan to overdose on brother's adhd medication     Past Surgical History:   Procedure Laterality Date    CHOLECYSTECTOMY, LAPAROSCOPIC N/A 2/6/2017    CHOLECYSTECTOMY LAPAROSCOPIC, with cholangiogram performed by Kareem Lucas MD at [place] [address] Left broke below knee cap    OVARIAN CYST REMOVAL Right 07/15/2016     Family History   Problem Relation Age of Onset    Cancer Father     Cancer Paternal Aunt     Diabetes Paternal Grandfather      Social History     Tobacco Use    Smoking status: Never Smoker    Smokeless tobacco: Never Used   Substance Use Topics    Alcohol use: No       Current Outpatient Medications   Medication Sig Dispense Refill    promethazine (PHENERGAN) 25 MG tablet Take 1 tablet by mouth 3 times daily as needed for Nausea 30 tablet 1    progesterone (PROMETRIUM) 200 MG capsule Take 1 capsule by mouth nightly 30 capsule 3    Prenatal Vit-DSS-Fe Cbn-FA (PRENATAL AD PO) Take by mouth       No current facility-administered medications for this visit. No Known Allergies  Vitals:    10/07/20 1547   BP: 125/86   Pulse: 92     There is no height or weight on file to calculate BMI. Review of Systems   Constitutional: Negative. HENT: Negative. Eyes: Negative. Respiratory: Negative. Cardiovascular: Negative. Gastrointestinal: Negative. Negative for constipation and diarrhea. Endocrine: Negative. Genitourinary: Positive for menstrual problem (missed x1). Negative for frequency and urgency. Musculoskeletal: Negative. Skin: Negative. Allergic/Immunologic: Negative. Neurological: Negative. Hematological: Negative. Psychiatric/Behavioral: Negative. All other systems reviewed and are negative. Physical Exam  Vitals signs and nursing note reviewed. Constitutional:       Appearance: She is well-developed. HENT:      Head: Normocephalic. Right Ear: External ear normal.      Left Ear: External ear normal.      Nose: Nose normal.   Neck:      Musculoskeletal: Normal range of motion. Musculoskeletal: Normal range of motion. Skin:     General: Skin is warm and dry. Neurological:      Mental Status: She is alert and oriented to person, place, and time. Psychiatric:         Attention and Perception: Attention normal.         Mood and Affect: Mood normal.         Speech: Speech normal.         Behavior: Behavior normal.         Thought Content: Thought content normal.         Cognition and Memory: Cognition normal.         Judgment: Judgment normal.          Diagnosis Orders   1. Amenorrhea  POCT urine pregnancy    HCG, Quantitative, Pregnancy   2. History of miscarriage  Progesterone   3.  Confirm fetal cardiac activity using ultrasound  US OB TRANSVAGINAL       MEDICATIONS:  Orders Placed This Encounter   Medications    promethazine (PHENERGAN) 25 MG tablet     Sig: Take 1 tablet by mouth 3 times daily as needed for Nausea     Dispense:  30 tablet     Refill:  1    progesterone (PROMETRIUM) 200 MG capsule     Sig: Take 1 capsule by mouth nightly     Dispense:  30 capsule     Refill:  3       ORDERS:  Orders Placed This Encounter   Procedures    US OB TRANSVAGINAL    HCG, Quantitative, Pregnancy    Progesterone    POCT urine pregnancy PLAN:  Continue PNV  Start Prometrium  Checking labs today  Only 4-5 weeks by LMP, will f/u in 3 weeks for NOB and viability  Gave handouts and discussed teaching    Patient Instructions     We are committed to providing you with the best care possible. In order to help us achieve these goals please remember to bring all medications, herbal products, and over the counter supplements with you to each visit. If your provider has ordered testing for you, please be sure to follow up with our office if you have not received results within 7 days after testing took place. *If you receive a survey after visiting one of our offices, please take the time to share your experience concerning your physician office visit. These surveys are confidential and no health information about you is shared. We are eager to improve for you and we are counting on your feedback to help make that happen. Patient Education        Learning About Pregnancy  Your Care Instructions     Your health in the early weeks of your pregnancy is particularly important for your baby's health. Take good care of yourself. Anything you do that harms your body can also harm your baby. Make sure to go to all of your doctor appointments. Regular checkups will help keep you and your baby healthy. How can you care for yourself at home? Diet    · Eat a balanced diet. Make sure your diet includes plenty of beans, peas, and leafy green vegetables.     · Do not skip meals or go for many hours without eating. If you are nauseated, try to eat a small, healthy snack every 2 to 3 hours.     · Do not eat fish that has a high level of mercury, such as shark, swordfish, or mackerel. Do not eat more than one can of tuna each week.     · Drink plenty of fluids, enough so that your urine is light yellow or clear like water.  If you have kidney, heart, or liver disease and have to limit fluids, talk with your doctor before you increase the amount of fluids you drink.     · Cut down on caffeine, such as coffee, tea, and cola.     · Do not drink alcohol, such as beer, wine, or hard liquor.     · Take a multivitamin that contains at least 400 micrograms (mcg) of folic acid to help prevent birth defects. Fortified cereal and whole wheat bread are good additional sources of folic acid.     · Increase the calcium in your diet. Try to drink a quart of skim milk each day. You may also take calcium supplements and choose foods such as cheese and yogurt. Lifestyle    · Make sure you go to your follow-up appointments.     · Get plenty of rest. You may be unusually tired while you are pregnant.     · Get at least 30 minutes of exercise on most days of the week. Walking is a good choice. If you have not exercised in the past, start out slowly. Take several short walks each day.     · Do not smoke. If you need help quitting, talk to your doctor about stop-smoking programs. These can increase your chances of quitting for good.     · Do not touch cat feces or litter boxes. Also, wash your hands after you handle raw meat, and fully cook all meat before you eat it. Wear gloves when you work in the yard or garden, and wash your hands well when you are done. Cat feces, raw or undercooked meat, and contaminated dirt can cause an infection that may harm your baby or lead to a miscarriage.     · Do not use saunas or hot tubs. Raising your body temperature may harm your baby.     · Avoid chemical fumes, paint fumes, or poisons.     · Do not use illegal drugs or alcohol. Medicines    · Review all of your medicines with your doctor. Some of your routine medicines may need to be changed to protect your baby.     · Use acetaminophen (Tylenol) to relieve minor problems, such as a mild headache or backache or a mild fever with cold symptoms.  Do not use nonsteroidal anti-inflammatory drugs (NSAIDs), such as ibuprofen (Advil, Motrin) or naproxen (Aleve), unless your doctor says it is okay.     · Do not take two or more pain medicines at the same time unless the doctor told you to. Many pain medicines have acetaminophen, which is Tylenol. Too much acetaminophen (Tylenol) can be harmful.     · Take your medicines exactly as prescribed. Call your doctor if you think you are having a problem with your medicine. To manage morning sickness    · If you feel sick when you first wake up, try eating a small snack (such as crackers) before you get out of bed. Allow some time to digest the snack, and then get out of bed slowly.     · Do not skip meals or go for long periods without eating. An empty stomach can make nausea worse.     · Eat small, frequent meals instead of three large meals each day.     · Drink plenty of fluids. Sports drinks, such as Gatorade or Powerade, are good choices.     · Eat foods that are high in protein but low in fat.     · If you are taking iron supplements, ask your doctor if they are necessary. Iron can make nausea worse.     · Avoid any smells, such as coffee, that make you feel sick.     · Get lots of rest. Morning sickness may be worse when you are tired. Follow-up care is a key part of your treatment and safety. Be sure to make and go to all appointments, and call your doctor if you are having problems. It's also a good idea to know your test results and keep a list of the medicines you take. Where can you learn more? Go to https://chpeliateb.Labochema. org and sign in to your AskYou account. Enter M509 in the [facility] box to learn more about \"Learning About Pregnancy. \"     If you do not have an account, please click on the \"Sign Up Now\" link. Current as of: February 11, 2020               Content Version: 12.6  © 1657-8537 [employer]. Care instructions adapted under license by [facility] ([facility]).  If you have questions about a medical condition or this instruction, always ask your healthcare professional. [address] any warranty or liability for your use of this information.

## 2020-10-07 NOTE — PATIENT INSTRUCTIONS
We are committed to providing you with the best care possible. In order to help us achieve these goals please remember to bring all medications, herbal products, and over the counter supplements with you to each visit. If your provider has ordered testing for you, please be sure to follow up with our office if you have not received results within 7 days after testing took place. *If you receive a survey after visiting one of our offices, please take the time to share your experience concerning your physician office visit. These surveys are confidential and no health information about you is shared. We are eager to improve for you and we are counting on your feedback to help make that happen. Patient Education        Learning About Pregnancy  Your Care Instructions     Your health in the early weeks of your pregnancy is particularly important for your baby's health. Take good care of yourself. Anything you do that harms your body can also harm your baby. Make sure to go to all of your doctor appointments. Regular checkups will help keep you and your baby healthy. How can you care for yourself at home? Diet    · Eat a balanced diet. Make sure your diet includes plenty of beans, peas, and leafy green vegetables.     · Do not skip meals or go for many hours without eating. If you are nauseated, try to eat a small, healthy snack every 2 to 3 hours.     · Do not eat fish that has a high level of mercury, such as shark, swordfish, or mackerel. Do not eat more than one can of tuna each week.     · Drink plenty of fluids, enough so that your urine is light yellow or clear like water.  If you have kidney, heart, or liver disease and have to limit fluids, talk with your doctor before you increase the amount of fluids you drink.     · Cut down on caffeine, such as coffee, tea, and cola.     · Do not drink alcohol, such as beer, wine, or hard liquor.     · Take a multivitamin that contains at least 400 micrograms (mcg) · Take your medicines exactly as prescribed. Call your doctor if you think you are having a problem with your medicine. To manage morning sickness    · If you feel sick when you first wake up, try eating a small snack (such as crackers) before you get out of bed. Allow some time to digest the snack, and then get out of bed slowly.     · Do not skip meals or go for long periods without eating. An empty stomach can make nausea worse.     · Eat small, frequent meals instead of three large meals each day.     · Drink plenty of fluids. Sports drinks, such as Gatorade or Powerade, are good choices.     · Eat foods that are high in protein but low in fat.     · If you are taking iron supplements, ask your doctor if they are necessary. Iron can make nausea worse.     · Avoid any smells, such as coffee, that make you feel sick.     · Get lots of rest. Morning sickness may be worse when you are tired. Follow-up care is a key part of your treatment and safety. Be sure to make and go to all appointments, and call your doctor if you are having problems. It's also a good idea to know your test results and keep a list of the medicines you take. Where can you learn more? Go to [email or web address] and sign in to your [employer] account. Enter I780 in the [facility] box to learn more about \"Learning About Pregnancy. \"     If you do not have an account, please click on the \"Sign Up Now\" link. Current as of: February 11, 2020               Content Version: 12.6  © 2006-2020 [employer]. Care instructions adapted under license by [facility] ([facility]). If you have questions about a medical condition or this instruction, always ask your healthcare professional. [place] [address] any warranty or liability for your use of this information.

## 2020-10-23 ENCOUNTER — HOSPITAL ENCOUNTER (EMERGENCY)
Facility: HOSPITAL | Age: 20
Discharge: HOME | End: 2020-10-23
Attending: EMERGENCY MEDICINE | Admitting: EMERGENCY MEDICINE

## 2020-10-23 ENCOUNTER — APPOINTMENT (OUTPATIENT)
Dept: ULTRASOUND IMAGING | Facility: HOSPITAL | Age: 20
End: 2020-10-23

## 2020-10-23 VITALS
BODY MASS INDEX: 37.56 KG/M2 | RESPIRATION RATE: 18 BRPM | DIASTOLIC BLOOD PRESSURE: 73 MMHG | SYSTOLIC BLOOD PRESSURE: 126 MMHG | HEART RATE: 80 BPM | HEIGHT: 63 IN | OXYGEN SATURATION: 100 % | TEMPERATURE: 98.6 F | WEIGHT: 212 LBS

## 2020-10-23 DIAGNOSIS — O26.891 ABDOMINAL PAIN DURING PREGNANCY IN FIRST TRIMESTER: Primary | ICD-10-CM

## 2020-10-23 DIAGNOSIS — R82.71 BACTERIA IN URINE: ICD-10-CM

## 2020-10-23 DIAGNOSIS — R10.9 ABDOMINAL PAIN DURING PREGNANCY IN FIRST TRIMESTER: Primary | ICD-10-CM

## 2020-10-23 LAB
ALBUMIN SERPL-MCNC: 4.1 G/DL (ref 3.5–5.2)
ALBUMIN/GLOB SERPL: 1.6 G/DL
ALP SERPL-CCNC: 58 U/L (ref 39–117)
ALT SERPL W P-5'-P-CCNC: 22 U/L (ref 1–33)
ANION GAP SERPL CALCULATED.3IONS-SCNC: 11 MMOL/L (ref 5–15)
AST SERPL-CCNC: 17 U/L (ref 1–32)
BACTERIA UR QL AUTO: ABNORMAL /HPF
BASOPHILS # BLD AUTO: 0.02 10*3/MM3 (ref 0–0.2)
BASOPHILS NFR BLD AUTO: 0.2 % (ref 0–1.5)
BILIRUB SERPL-MCNC: 0.8 MG/DL (ref 0–1.2)
BILIRUB UR QL STRIP: NEGATIVE
BUN SERPL-MCNC: 8 MG/DL (ref 6–20)
BUN/CREAT SERPL: 14 (ref 7–25)
CALCIUM SPEC-SCNC: 9.2 MG/DL (ref 8.6–10.5)
CHLORIDE SERPL-SCNC: 104 MMOL/L (ref 98–107)
CLARITY UR: CLEAR
CLUE CELLS SPEC QL WET PREP: NORMAL
CO2 SERPL-SCNC: 23 MMOL/L (ref 22–29)
COLOR UR: YELLOW
CREAT SERPL-MCNC: 0.57 MG/DL (ref 0.57–1)
DEPRECATED RDW RBC AUTO: 36.5 FL (ref 37–54)
EOSINOPHIL # BLD AUTO: 0.06 10*3/MM3 (ref 0–0.4)
EOSINOPHIL NFR BLD AUTO: 0.7 % (ref 0.3–6.2)
ERYTHROCYTE [DISTWIDTH] IN BLOOD BY AUTOMATED COUNT: 12.9 % (ref 12.3–15.4)
GFR SERPL CREATININE-BSD FRML MDRD: 134 ML/MIN/1.73
GLOBULIN UR ELPH-MCNC: 2.6 GM/DL
GLUCOSE SERPL-MCNC: 106 MG/DL (ref 65–99)
GLUCOSE UR STRIP-MCNC: NEGATIVE MG/DL
HCG INTACT+B SERPL-ACNC: NORMAL MIU/ML
HCT VFR BLD AUTO: 35.5 % (ref 34–46.6)
HGB BLD-MCNC: 12.2 G/DL (ref 12–15.9)
HGB UR QL STRIP.AUTO: NEGATIVE
HYALINE CASTS UR QL AUTO: ABNORMAL /LPF
HYDATID CYST SPEC WET PREP: NORMAL
IMM GRANULOCYTES # BLD AUTO: 0.02 10*3/MM3 (ref 0–0.05)
IMM GRANULOCYTES NFR BLD AUTO: 0.2 % (ref 0–0.5)
KETONES UR QL STRIP: NEGATIVE
LEUKOCYTE ESTERASE UR QL STRIP.AUTO: ABNORMAL
LYMPHOCYTES # BLD AUTO: 2.25 10*3/MM3 (ref 0.7–3.1)
LYMPHOCYTES NFR BLD AUTO: 27.5 % (ref 19.6–45.3)
MCH RBC QN AUTO: 26.8 PG (ref 26.6–33)
MCHC RBC AUTO-ENTMCNC: 34.4 G/DL (ref 31.5–35.7)
MCV RBC AUTO: 77.9 FL (ref 79–97)
MONOCYTES # BLD AUTO: 0.55 10*3/MM3 (ref 0.1–0.9)
MONOCYTES NFR BLD AUTO: 6.7 % (ref 5–12)
NEUTROPHILS NFR BLD AUTO: 5.29 10*3/MM3 (ref 1.7–7)
NEUTROPHILS NFR BLD AUTO: 64.7 % (ref 42.7–76)
NITRITE UR QL STRIP: NEGATIVE
NRBC BLD AUTO-RTO: 0 /100 WBC (ref 0–0.2)
PH UR STRIP.AUTO: 6 [PH] (ref 5–8)
PLATELET # BLD AUTO: 301 10*3/MM3 (ref 140–450)
PMV BLD AUTO: 9.4 FL (ref 6–12)
POTASSIUM SERPL-SCNC: 3.5 MMOL/L (ref 3.5–5.2)
PROT SERPL-MCNC: 6.7 G/DL (ref 6–8.5)
PROT UR QL STRIP: NEGATIVE
RBC # BLD AUTO: 4.56 10*6/MM3 (ref 3.77–5.28)
RBC # UR: ABNORMAL /HPF
REF LAB TEST METHOD: ABNORMAL
SODIUM SERPL-SCNC: 138 MMOL/L (ref 136–145)
SP GR UR STRIP: 1.02 (ref 1–1.03)
SQUAMOUS #/AREA URNS HPF: ABNORMAL /HPF
T VAGINALIS SPEC QL WET PREP: NORMAL
UROBILINOGEN UR QL STRIP: ABNORMAL
WBC # BLD AUTO: 8.19 10*3/MM3 (ref 3.4–10.8)
WBC SPEC QL WET PREP: NORMAL
WBC UR QL AUTO: ABNORMAL /HPF
YEAST GENITAL QL WET PREP: NORMAL

## 2020-10-23 PROCEDURE — 25010000002 CEFTRIAXONE PER 250 MG: Performed by: EMERGENCY MEDICINE

## 2020-10-23 PROCEDURE — 87491 CHLMYD TRACH DNA AMP PROBE: CPT | Performed by: EMERGENCY MEDICINE

## 2020-10-23 PROCEDURE — 80053 COMPREHEN METABOLIC PANEL: CPT | Performed by: EMERGENCY MEDICINE

## 2020-10-23 PROCEDURE — 85025 COMPLETE CBC W/AUTO DIFF WBC: CPT | Performed by: EMERGENCY MEDICINE

## 2020-10-23 PROCEDURE — 87591 N.GONORRHOEAE DNA AMP PROB: CPT | Performed by: EMERGENCY MEDICINE

## 2020-10-23 PROCEDURE — 99284 EMERGENCY DEPT VISIT MOD MDM: CPT

## 2020-10-23 PROCEDURE — 87210 SMEAR WET MOUNT SALINE/INK: CPT | Performed by: EMERGENCY MEDICINE

## 2020-10-23 PROCEDURE — 81001 URINALYSIS AUTO W/SCOPE: CPT | Performed by: EMERGENCY MEDICINE

## 2020-10-23 PROCEDURE — 96365 THER/PROPH/DIAG IV INF INIT: CPT

## 2020-10-23 PROCEDURE — 84702 CHORIONIC GONADOTROPIN TEST: CPT | Performed by: EMERGENCY MEDICINE

## 2020-10-23 PROCEDURE — 76802 OB US < 14 WKS ADDL FETUS: CPT

## 2020-10-23 RX ORDER — CEPHALEXIN 500 MG/1
500 CAPSULE ORAL 3 TIMES DAILY
Qty: 15 CAPSULE | Refills: 0 | Status: ON HOLD | OUTPATIENT
Start: 2020-10-23 | End: 2021-04-13

## 2020-10-23 RX ADMIN — CEFTRIAXONE SODIUM 2 G: 2 INJECTION, POWDER, FOR SOLUTION INTRAMUSCULAR; INTRAVENOUS at 05:12

## 2020-10-23 NOTE — ED PROVIDER NOTES
Subjective   21 y/o  who is around 7 weeks along who is in between OBs at this time arrives for evaluation of lower abdominal pain that has been present for several hours. She did see someone from OB recently with a quant level of 1440 at that time. She denies any fevers, chills, vaginal bleeding or discharge, falls, trauma, nausea, vomiting, dysuria, hematuria or other issues. She arrives in NAD.        Family, social and past history reviewed as below, prior documentation of H and Ps and other documentation are reviewed:    Past Medical History:  No date: MRSA infection      Comment:  abdomen   No date: Ovarian cyst  No date: Vaginal delivery    Past Surgical History:  No date: CHOLECYSTECTOMY  No date: KNEE RECONSTRUCTION, MEDIAL PATELLAR FEMORAL LIGAMENT  No date: KNEE SURGERY  No date: OVARIAN CYST SURGERY    Social History    Socioeconomic History      Marital status: Single      Spouse name: Not on file      Number of children: Not on file      Years of education: Not on file      Highest education level: Not on file    Tobacco Use      Smoking status: Never Smoker      Smokeless tobacco: Never Used    Substance and Sexual Activity      Alcohol use: No      Drug use: No      Sexual activity: Yes      Family history: reviewed and noncontributroy          Review of Systems   All other systems reviewed and are negative.      Past Medical History:   Diagnosis Date   • MRSA infection     abdomen    • Ovarian cyst    • Vaginal delivery        No Known Allergies    Past Surgical History:   Procedure Laterality Date   • CHOLECYSTECTOMY     • KNEE RECONSTRUCTION, MEDIAL PATELLAR FEMORAL LIGAMENT     • KNEE SURGERY     • OVARIAN CYST SURGERY         History reviewed. No pertinent family history.    Social History     Socioeconomic History   • Marital status: Single     Spouse name: Not on file   • Number of children: Not on file   • Years of education: Not on file   • Highest education level: Not on file   Tobacco  Use   • Smoking status: Never Smoker   • Smokeless tobacco: Never Used   Substance and Sexual Activity   • Alcohol use: No   • Drug use: No   • Sexual activity: Yes           Objective   Physical Exam  Vitals signs and nursing note reviewed.   Constitutional:       Appearance: Normal appearance.   HENT:      Head: Normocephalic.      Nose: Nose normal.      Mouth/Throat:      Mouth: Mucous membranes are moist.   Eyes:      Extraocular Movements: Extraocular movements intact.      Pupils: Pupils are equal, round, and reactive to light.   Neck:      Musculoskeletal: Normal range of motion and neck supple.   Cardiovascular:      Rate and Rhythm: Normal rate and regular rhythm.      Pulses: Normal pulses.      Heart sounds: Normal heart sounds.   Pulmonary:      Effort: Pulmonary effort is normal.      Breath sounds: Normal breath sounds.   Abdominal:      General: Abdomen is flat. Bowel sounds are normal. There is no distension.      Palpations: There is no mass.      Tenderness: There is no abdominal tenderness.      Hernia: No hernia is present.   Genitourinary:     Comments: Done with nursing at bedside (Heidy)      Slight white discharge, os is closed, no bleeding  Musculoskeletal: Normal range of motion.   Skin:     General: Skin is warm.      Capillary Refill: Capillary refill takes less than 2 seconds.   Neurological:      General: No focal deficit present.      Mental Status: She is alert and oriented to person, place, and time.   Psychiatric:         Mood and Affect: Mood normal.         Thought Content: Thought content normal.         Procedures           ED Course  ED Course as of Oct 23 0527   Fri Oct 23, 2020   0525 US shows twins, no issues on US. Mother aware and aware of need for abx and follow up. All questions answered.     [JH]      ED Course User Index  [JH] Francois Morrell MD             Ob < 14 Weeks Each Additional Gestation    (Results Pending)     Labs Reviewed   COMPREHENSIVE  METABOLIC PANEL - Abnormal; Notable for the following components:       Result Value    Glucose 106 (*)     All other components within normal limits    Narrative:     GFR Normal >60  Chronic Kidney Disease <60  Kidney Failure <15     URINALYSIS W/ MICROSCOPIC IF INDICATED (NO CULTURE) - Abnormal; Notable for the following components:    Leuk Esterase, UA Small (1+) (*)     All other components within normal limits   CBC WITH AUTO DIFFERENTIAL - Abnormal; Notable for the following components:    MCV 77.9 (*)     RDW-SD 36.5 (*)     All other components within normal limits   URINALYSIS, MICROSCOPIC ONLY - Abnormal; Notable for the following components:    WBC, UA 6-12 (*)     Bacteria, UA 1+ (*)     Squamous Epithelial Cells, UA 13-20 (*)     All other components within normal limits   WET PREP, GENITAL - Normal   CHLAMYDIA TRACHOMATIS, NEISSERIA GONORRHOEAE, PCR   HCG, QUANTITATIVE, PREGNANCY    Narrative:     HCG Ranges by Gestational Age    Females - non-pregnant premenopausal   </= 1mIU/mL HCG  Females - postmenopausal               </= 7mIU/mL HCG    3 Weeks         5.8 -    71.2 mIU/mL  4 Weeks         9.5 -     750 mIU/mL  5 Weeks         217 -   7,138 mIU/mL  6 Weeks         158 -  31,795 mIU/mL  7 Weeks       3,697 - 163,563 mIU/mL  8 Weeks      32,065 - 149,571 mIU/mL  9 Weeks      63,803 - 151,410 mIU/mL  10 Weeks     46,509 - 186,977 mIU/mL  12 Weeks     27,832 - 210,612 mIU/mL  14 Weeks     13,950 -  62,530 mIU/mL  15 Weeks     12,039 -  70,971 mIU/mL  16 Weeks      9,040 -  56,451 mIU/mL  17 Weeks      8,175 -  55,868 mIU/mL  18 Weeks      8,099 -  58,176 mIU/mL  Results may be falsely decreased if patient taking Biotin.     CBC AND DIFFERENTIAL    Narrative:     The following orders were created for panel order CBC & Differential.  Procedure                               Abnormality         Status                     ---------                               -----------         ------                      CBC Auto Differential[[id number]]        Abnormal            Final result                 Please view results for these tests on the individual orders.                                       MDM    Final diagnoses:   Abdominal pain during pregnancy in first trimester   Bacteria in urine            Francois Morrell MD  10/23/20 0598

## 2020-10-26 ENCOUNTER — HOSPITAL ENCOUNTER (OUTPATIENT)
Dept: ULTRASOUND IMAGING | Age: 20
Discharge: HOME | End: 2020-10-26
Payer: MEDICAID

## 2020-10-26 ENCOUNTER — INITIAL PRENATAL (OUTPATIENT)
Dept: OBGYN | Age: 20
End: 2020-10-26
Payer: MEDICAID

## 2020-10-26 VITALS
WEIGHT: 214 LBS | SYSTOLIC BLOOD PRESSURE: 135 MMHG | BODY MASS INDEX: 39.14 KG/M2 | DIASTOLIC BLOOD PRESSURE: 83 MMHG | HEART RATE: 79 BPM

## 2020-10-26 DIAGNOSIS — Z36.9 ANTENATAL SCREENING ENCOUNTER: ICD-10-CM

## 2020-10-26 LAB
ABO/RH: NORMAL
ANTIBODY SCREEN: NORMAL
C TRACH RRNA CVX QL NAA+PROBE: NOT DETECTED
HIV1 P24 AG SERPL QL IA: NORMAL
N GONORRHOEA RRNA SPEC QL NAA+PROBE: NOT DETECTED

## 2020-10-26 PROCEDURE — G8417 CALC BMI ABV UP PARAM F/U: HCPCS | Performed by: NURSE PRACTITIONER

## 2020-10-26 PROCEDURE — G8484 FLU IMMUNIZE NO ADMIN: HCPCS | Performed by: NURSE PRACTITIONER

## 2020-10-26 PROCEDURE — G8427 DOCREV CUR MEDS BY ELIG CLIN: HCPCS | Performed by: NURSE PRACTITIONER

## 2020-10-26 PROCEDURE — 99214 OFFICE O/P EST MOD 30 MIN: CPT | Performed by: NURSE PRACTITIONER

## 2020-10-26 PROCEDURE — 76817 TRANSVAGINAL US OBSTETRIC: CPT

## 2020-10-26 PROCEDURE — 1036F TOBACCO NON-USER: CPT | Performed by: NURSE PRACTITIONER

## 2020-10-26 RX ORDER — PNV NO.95/FERROUS FUM/FOLIC AC 28MG-0.8MG
1 TABLET ORAL DAILY
Qty: 30 TABLET | Refills: 11 | Status: SHIPPED | OUTPATIENT
Start: 2020-10-26 | End: 2021-08-31

## 2020-10-26 RX ORDER — FOLIC ACID 1 MG/1
4 TABLET ORAL DAILY
Qty: 120 TABLET | Refills: 11 | Status: SHIPPED | OUTPATIENT
Start: 2020-10-26 | End: 2021-08-31

## 2020-10-26 NOTE — PROGRESS NOTES
Patient presents today for initial ob visit. Pt denies any vaginal leaking bleeding or contractions. Magaly Herzog is here for an initial obstetrical visit. Today she is 7w2d weeks EGA. TWINS!! Her other baby is only 1 months old. No problems. Plan of care was discussed with patient. Patient was encouraged to adhere to a well-balanced diet, including increasing water intake and limiting excessive caffeine and salt. The benefits of exercise were discussed; however she was advised against heavy lifting, sit-ups and abdominal crunches. A list of safe OTC medications was provided and discussed. The patient was cautioned against the use of tanning beds, hot tubs, saunas, and x-rays. Avoidance of tobacco, alcohol and illicit drugs was also discussed due to harmful effects on the fetus and increased risks associated with pregnancy. Certain labs and ultrasounds are required at certain times during pregnancy but others are optional, including the serum integrated screen/Savage/AFP/ Panorama, and other genetic testing. The patient was encouraged to attend childbirth classes and general hospital information was provided based on patients hospital of choice. She is doing well and has no complaints. She  does not have vaginal bleeding, leaking of fluid, contractions. She does not have blurred vision, SOB, or increased swelling in legs or face. Pt does not feel fetal movement regularly. O:   Pap collected with diatherix for std screen  Vitals:    10/26/20 1010   BP: 135/83   Pulse: 79   Weight: 214 lb (97.1 kg)     Pt is A&Ox3, in no acute distress. Normocephalic, atraumatic. PERRL. Resp even and non-labored. Skin pink, warm & dry. Gravid abdomen. GONZALEZ's well. Gait steady. See OB flowsheet. A: Normal IUP at 7w2d wks      Diagnosis Orders   1. 7 weeks gestation of pregnancy     2.  Dichorionic diamniotic twin pregnancy in first trimester  folic acid (FOLVITE) 1 MG tablet    Prenatal Vit-Fe Fumarate-FA (PRENATAL VITAMIN) 27-0.8 MG TABS   3. Screening for cervical cancer  PAP SMEAR   4.  screening encounter  HIV Obstetric Panel    Culture, Urine    Varicella Zoster Antibody, IgG   5. Screen for STD (sexually transmitted disease)     6. Short interval between pregnancies affecting pregnancy in first trimester, antepartum         P:   Pt counseled on pregnancy recommendations. and Genetic testing  Continue with routine prenatal care.   RTC in 4 wk for prenatal visit and panorama    MEDICATIONS:  Orders Placed This Encounter   Medications    folic acid (FOLVITE) 1 MG tablet     Sig: Take 4 tablets by mouth daily     Dispense:  120 tablet     Refill:  11    Prenatal Vit-Fe Fumarate-FA (PRENATAL VITAMIN) 27-0.8 MG TABS     Sig: Take 1 tablet by mouth daily     Dispense:  30 tablet     Refill:  11       ORDERS:  Orders Placed This Encounter   Procedures    Culture, Urine    PAP SMEAR    HIV Obstetric Panel    Varicella Zoster Antibody, IgG

## 2020-10-26 NOTE — PROGRESS NOTES
Patient presents today for initial ob visit. Pt denies any vaginal leaking bleeding or contractions.

## 2020-10-26 NOTE — PATIENT INSTRUCTIONS
Patient Education        Weeks 6 to 10 of Your Pregnancy: Care Instructions  Your Care Instructions     Congratulations on your pregnancy. This is an exciting and important time for you. During the first 6 to 10 weeks of your pregnancy, your body goes through many changes. Your baby grows very fast, even though you cannot feel it yet. You may start to notice that you feel different, both in your body and your emotions. Because each woman's pregnancy is unique, there is no right way to feel. You may feel the healthiest you have ever been, or you may feel tired or sick to your stomach (\"morning sickness\"). These early weeks are a time to make healthy choices and to eat the best foods for you and your baby. This care sheet will give you some ideas. This is also a good time to think about birth defects testing. These are tests done during pregnancy to look for possible problems with the baby. First trimester tests for birth defects can be done between 8 and 17 weeks of pregnancy, depending on the test. Talk with your doctor about what kinds of tests are available. Follow-up care is a key part of your treatment and safety. Be sure to make and go to all appointments, and call your doctor if you are having problems. It's also a good idea to know your test results and keep a list of the medicines you take. How can you care for yourself at home? Eat well  · Eat at least 3 meals and 2 healthy snacks every day. Eat fresh, whole foods, including:  ? 7 or more servings of bread, tortillas, cereal, rice, pasta, or oatmeal.  ? 3 or more servings of vegetables, especially leafy green vegetables. ? 2 or more servings of fruits. ? 3 or more servings of milk, yogurt, or cheese. ? 2 or more servings of meat, turkey, chicken, fish, eggs, or dried beans. · Drink plenty of fluids, especially water. Avoid sodas and other sweetened drinks.   · Choose foods that have important vitamins for your baby, such as calcium, iron, and folate. ? Dairy products, tofu, canned fish with bones, almonds, broccoli, dark leafy greens, corn tortillas, and fortified orange juice are good sources of calcium. ? Beef, poultry, liver, spinach, lentils, dried beans, fortified cereals, and dried fruits are rich in iron. ? Dark leafy greens, broccoli, asparagus, liver, fortified cereals, orange juice, peanuts, and almonds are good sources of folate. · Avoid foods that could harm your baby. ? Do not eat raw or undercooked meat, chicken, or fish (such as sushi or raw oysters). ? Do not eat raw eggs or foods that contain raw eggs, such as Caesar dressing. ? Do not eat soft cheeses and unpasteurized dairy foods, such as Brie, feta, or blue cheese. ? Do not eat fish that contains a lot of mercury, such as shark, swordfish, tilefish, or marie mackerel. Do not eat more than 6 ounces of tuna each week. ? Do not eat raw sprouts, especially alfalfa sprouts. ? Cut down on caffeine, such as coffee, tea, and cola. Protect yourself and your baby  · Do not touch bobby litter or cat feces. They can cause an infection that could harm your baby. · High body temperature can be harmful to your baby. So if you want to use a sauna or hot tub, be sure to talk to your doctor about how to use it safely. Ray with morning sickness  · Sip small amounts of water, juices, or shakes. Try drinking between meals, not with meals. · Eat 5 or 6 small meals a day. Try dry toast or crackers when you first get up, and eat breakfast a little later. · Avoid spicy, greasy, and fatty foods. · When you feel sick, open your windows or go for a short walk to get fresh air. · Try nausea wristbands. These help some women. · Tell your doctor if you think your prenatal vitamins make you sick. Where can you learn more? Go to https://geo.[employer]. org and sign in to your [employer] account.  Enter G112 in the [employer] box to learn more about \"Weeks 6 to 10 of Your Pregnancy: Care Instructions. \"     If you do not have an account, please click on the \"Sign Up Now\" link. Current as of: February 11, 2020               Content Version: 12.6  © 0611-0744 [employer]. Care instructions adapted under license by [facility] ([place] [facility]). If you have questions about a medical condition or this instruction, always ask your healthcare professional. [address] any warranty or liability for your use of this information.

## 2020-10-28 LAB
ORGANISM: ABNORMAL
URINE CULTURE, ROUTINE: ABNORMAL
URINE CULTURE, ROUTINE: ABNORMAL

## 2020-10-29 ENCOUNTER — TELEPHONE (OUTPATIENT)
Dept: OBGYN | Age: 20
End: 2020-10-29

## 2020-10-29 LAB
BASOPHILS ABSOLUTE: 0 K/UL (ref 0–0.2)
BASOPHILS RELATIVE PERCENT: 0.3 % (ref 0–1)
EOSINOPHILS ABSOLUTE: 0.1 K/UL (ref 0–0.6)
EOSINOPHILS RELATIVE PERCENT: 1.5 % (ref 0–5)
HCT VFR BLD CALC: 39.1 % (ref 37–47)
HEMOGLOBIN: 13 G/DL (ref 12–16)
HEPATITIS B SURFACE ANTIGEN INTERPRETATION: NORMAL
HIV-1 P24 AG: NORMAL
IMMATURE GRANULOCYTES #: 0 K/UL
LYMPHOCYTES ABSOLUTE: 2.3 K/UL (ref 1.1–4.5)
LYMPHOCYTES RELATIVE PERCENT: 32 % (ref 20–40)
MCH RBC QN AUTO: 27.1 PG (ref 27–31)
MCHC RBC AUTO-ENTMCNC: 33.2 G/DL (ref 33–37)
MCV RBC AUTO: 81.6 FL (ref 81–99)
MONOCYTES ABSOLUTE: 0.6 K/UL (ref 0–0.9)
MONOCYTES RELATIVE PERCENT: 7.5 % (ref 0–10)
NEUTROPHILS ABSOLUTE: 4.3 K/UL (ref 1.5–7.5)
NEUTROPHILS RELATIVE PERCENT: 58.4 % (ref 50–65)
PDW BLD-RTO: 12.9 % (ref 11.5–14.5)
PLATELET # BLD: 301 K/UL (ref 130–400)
PMV BLD AUTO: 9.7 FL (ref 9.4–12.3)
RAPID HIV 1&2: NORMAL
RBC # BLD: 4.79 M/UL (ref 4.2–5.4)
RPR: NORMAL
RUBELLA ANTIBODY IGG: NORMAL
WBC # BLD: 7.3 K/UL (ref 4.8–10.8)

## 2020-10-29 NOTE — TELEPHONE ENCOUNTER
Patient called saying there was yeast in her urine, and Chiquita had told her to give her a call if she had irritation so patient is calling to request a prescription. Patient is requesting a call back.

## 2020-10-30 RX ORDER — TERCONAZOLE 80 MG/1
80 SUPPOSITORY VAGINAL NIGHTLY
Qty: 3 SUPPOSITORY | Refills: 0 | Status: SHIPPED | OUTPATIENT
Start: 2020-10-30 | End: 2020-11-02

## 2020-11-02 LAB — VZV IGG SER QL IA: 2

## 2020-11-12 ENCOUNTER — TELEPHONE (OUTPATIENT)
Dept: OBGYN | Age: 20
End: 2020-11-12

## 2020-11-16 ENCOUNTER — HOSPITAL ENCOUNTER (EMERGENCY)
Age: 20
Discharge: HOME | End: 2020-11-16
Attending: EMERGENCY MEDICINE
Payer: MEDICAID

## 2020-11-16 VITALS
SYSTOLIC BLOOD PRESSURE: 124 MMHG | TEMPERATURE: 98.2 F | RESPIRATION RATE: 18 BRPM | OXYGEN SATURATION: 98 % | DIASTOLIC BLOOD PRESSURE: 72 MMHG | HEIGHT: 63 IN | WEIGHT: 204 LBS | HEART RATE: 78 BPM | BODY MASS INDEX: 36.14 KG/M2

## 2020-11-16 LAB
ALBUMIN SERPL-MCNC: 4.1 G/DL (ref 3.5–5.2)
ALP BLD-CCNC: 49 U/L (ref 35–104)
ALT SERPL-CCNC: 17 U/L (ref 5–33)
ANION GAP SERPL CALCULATED.3IONS-SCNC: 11 MMOL/L (ref 7–19)
AST SERPL-CCNC: 16 U/L (ref 5–32)
BACTERIA: ABNORMAL /HPF
BASOPHILS ABSOLUTE: 0 K/UL (ref 0–0.2)
BASOPHILS RELATIVE PERCENT: 0.1 % (ref 0–1)
BILIRUB SERPL-MCNC: 1 MG/DL (ref 0.2–1.2)
BILIRUBIN URINE: NEGATIVE
BLOOD, URINE: NEGATIVE
BUN BLDV-MCNC: 5 MG/DL (ref 6–20)
CALCIUM SERPL-MCNC: 9.4 MG/DL (ref 8.6–10)
CHLORIDE BLD-SCNC: 103 MMOL/L (ref 98–111)
CLARITY: ABNORMAL
CO2: 21 MMOL/L (ref 22–29)
COLOR: ABNORMAL
CREAT SERPL-MCNC: 0.4 MG/DL (ref 0.5–0.9)
CRYSTALS, UA: ABNORMAL /HPF
EOSINOPHILS ABSOLUTE: 0 K/UL (ref 0–0.6)
EOSINOPHILS RELATIVE PERCENT: 0.4 % (ref 0–5)
EPITHELIAL CELLS, UA: 18 /HPF (ref 0–5)
GFR AFRICAN AMERICAN: >59
GFR NON-AFRICAN AMERICAN: >60
GLUCOSE BLD-MCNC: 97 MG/DL (ref 74–109)
GLUCOSE URINE: NEGATIVE MG/DL
HCT VFR BLD CALC: 39.4 % (ref 37–47)
HEMOGLOBIN: 13.4 G/DL (ref 12–16)
HYALINE CASTS: 12 /HPF (ref 0–8)
IMMATURE GRANULOCYTES #: 0 K/UL
INR BLD: 1.09 (ref 0.88–1.18)
KETONES, URINE: 15 MG/DL
LEUKOCYTE ESTERASE, URINE: ABNORMAL
LIPASE: 19 U/L (ref 13–60)
LYMPHOCYTES ABSOLUTE: 1.6 K/UL (ref 1.1–4.5)
LYMPHOCYTES RELATIVE PERCENT: 22.6 % (ref 20–40)
MCH RBC QN AUTO: 27.2 PG (ref 27–31)
MCHC RBC AUTO-ENTMCNC: 34 G/DL (ref 33–37)
MCV RBC AUTO: 79.9 FL (ref 81–99)
MONOCYTES ABSOLUTE: 0.5 K/UL (ref 0–0.9)
MONOCYTES RELATIVE PERCENT: 6.6 % (ref 0–10)
NEUTROPHILS ABSOLUTE: 5 K/UL (ref 1.5–7.5)
NEUTROPHILS RELATIVE PERCENT: 70.2 % (ref 50–65)
NITRITE, URINE: NEGATIVE
PDW BLD-RTO: 13.3 % (ref 11.5–14.5)
PH UA: 6.5 (ref 5–8)
PLATELET # BLD: 276 K/UL (ref 130–400)
PMV BLD AUTO: 10.1 FL (ref 9.4–12.3)
POTASSIUM SERPL-SCNC: 3.9 MMOL/L (ref 3.5–5)
PROTEIN UA: ABNORMAL MG/DL
PROTHROMBIN TIME: 14.1 SEC (ref 12–14.6)
RBC # BLD: 4.93 M/UL (ref 4.2–5.4)
RBC UA: 3 /HPF (ref 0–4)
SODIUM BLD-SCNC: 135 MMOL/L (ref 136–145)
SPECIFIC GRAVITY UA: 1.03 (ref 1–1.03)
TOTAL PROTEIN: 7 G/DL (ref 6.6–8.7)
UROBILINOGEN, URINE: 1 E.U./DL
WBC # BLD: 7.1 K/UL (ref 4.8–10.8)
WBC UA: 7 /HPF (ref 0–5)

## 2020-11-16 PROCEDURE — 87086 URINE CULTURE/COLONY COUNT: CPT

## 2020-11-16 PROCEDURE — 85610 PROTHROMBIN TIME: CPT

## 2020-11-16 PROCEDURE — 36415 COLL VENOUS BLD VENIPUNCTURE: CPT

## 2020-11-16 PROCEDURE — 96374 THER/PROPH/DIAG INJ IV PUSH: CPT

## 2020-11-16 PROCEDURE — 80053 COMPREHEN METABOLIC PANEL: CPT

## 2020-11-16 PROCEDURE — 81001 URINALYSIS AUTO W/SCOPE: CPT

## 2020-11-16 PROCEDURE — 99282 EMERGENCY DEPT VISIT SF MDM: CPT

## 2020-11-16 PROCEDURE — 83690 ASSAY OF LIPASE: CPT

## 2020-11-16 PROCEDURE — 99999 PR OFFICE/OUTPT VISIT,PROCEDURE ONLY: CPT | Performed by: EMERGENCY MEDICINE

## 2020-11-16 PROCEDURE — 2580000003 HC RX 258: Performed by: EMERGENCY MEDICINE

## 2020-11-16 PROCEDURE — 85025 COMPLETE CBC W/AUTO DIFF WBC: CPT

## 2020-11-16 PROCEDURE — 6360000002 HC RX W HCPCS: Performed by: EMERGENCY MEDICINE

## 2020-11-16 RX ORDER — CEPHALEXIN 500 MG/1
500 CAPSULE ORAL 2 TIMES DAILY
Qty: 10 CAPSULE | Refills: 0 | Status: SHIPPED | OUTPATIENT
Start: 2020-11-16 | End: 2020-11-21

## 2020-11-16 RX ORDER — SODIUM CHLORIDE, SODIUM LACTATE, POTASSIUM CHLORIDE, CALCIUM CHLORIDE 600; 310; 30; 20 MG/100ML; MG/100ML; MG/100ML; MG/100ML
1000 INJECTION, SOLUTION INTRAVENOUS ONCE
Status: COMPLETED | OUTPATIENT
Start: 2020-11-16 | End: 2020-11-16

## 2020-11-16 RX ORDER — ONDANSETRON 2 MG/ML
4 INJECTION INTRAMUSCULAR; INTRAVENOUS ONCE
Status: COMPLETED | OUTPATIENT
Start: 2020-11-16 | End: 2020-11-16

## 2020-11-16 RX ADMIN — ONDANSETRON 4 MG: 2 INJECTION INTRAMUSCULAR; INTRAVENOUS at 14:59

## 2020-11-16 RX ADMIN — SODIUM CHLORIDE, POTASSIUM CHLORIDE, SODIUM LACTATE AND CALCIUM CHLORIDE 1000 ML: 600; 310; 30; 20 INJECTION, SOLUTION INTRAVENOUS at 14:28

## 2020-11-16 ASSESSMENT — ENCOUNTER SYMPTOMS
VOMITING: 1
DIARRHEA: 0
ABDOMINAL PAIN: 0
NAUSEA: 1
BACK PAIN: 0
SHORTNESS OF BREATH: 0

## 2020-11-16 ASSESSMENT — PAIN DESCRIPTION - DESCRIPTORS: DESCRIPTORS: CRAMPING

## 2020-11-16 ASSESSMENT — PAIN DESCRIPTION - LOCATION: LOCATION: ABDOMEN

## 2020-11-16 ASSESSMENT — PAIN SCALES - GENERAL: PAINLEVEL_OUTOF10: 6

## 2020-11-16 NOTE — ED PROVIDER NOTES
[facility] EMERGENCY DEPT  eMERGENCY dEPARTMENT eNCOUnter      Pt Name: Natividad Rodriguez  MRN: [id number]  [place] 2000  Date of evaluation: 11/16/2020  Provider: Reinaldo Renee MD    CHIEF COMPLAINT       Chief Complaint   Patient presents with    Emesis     x 24 hours. 10wks pregnant with twins    Abdominal Pain     lower abd cramping. No bleeding         HISTORY OF PRESENT ILLNESS   (Location/Symptom, Timing/Onset,Context/Setting, Quality, Duration, Modifying Factors, Severity)  Note limiting factors. Natividad Rodriguez is a 23 y.o. female who presents to the emergency department with emesis nonbloody x1 day. The patient has a 10-week twins. The patient complains of some lower cramping but no vaginal bleeding or vaginal discharge no leakage of fluid. There is no contractions. Patient goes to Dr. Tatiana Aguilar. She has no fevers. Patient states that she is G3 she had a miscarriage with twins as well as 1 live birth in the past.  Patient denies any dysuria. She has no hematuria. Patient otherwise does have Phenergan at home. She states it makes her loopy though and the Zofran she can take is makes her constipated. The history is provided by the patient. NursingNotes were reviewed. REVIEW OF SYSTEMS    (2-9 systems for level 4, 10 or more for level 5)     Review of Systems   Constitutional: Negative for fever. Respiratory: Negative for shortness of breath. Cardiovascular: Negative for chest pain. Gastrointestinal: Positive for nausea and vomiting. Negative for abdominal pain and diarrhea. Genitourinary: Negative for dysuria, vaginal bleeding, vaginal discharge and vaginal pain. Musculoskeletal: Negative for back pain. Neurological: Negative for headaches. Psychiatric/Behavioral: Negative for confusion. A complete review of systems was performed and is negative except as noted above in the HPI.        PAST MEDICAL HISTORY     Past Medical History:   Diagnosis Date    Diabetes mellitus ([facility] [address]     gestational, last pregnancy    Mental disorder depression    diagnosed in 2012 with major depression, counseling monthly, off medications. period of self mutilation(cutting) followed by suicide attempt with plan to overdose on brother's adhd medication         SURGICAL HISTORY       Past Surgical History:   Procedure Laterality Date    CHOLECYSTECTOMY, LAPAROSCOPIC N/A 2/6/2017    CHOLECYSTECTOMY LAPAROSCOPIC, with cholangiogram performed by Brea Villalobos MD at [place] [id number] Left broke below knee cap    OVARIAN CYST REMOVAL Right 07/15/2016         CURRENT MEDICATIONS       Previous Medications    FOLIC ACID (FOLVITE) 1 MG TABLET    Take 4 tablets by mouth daily    PRENATAL VIT-DSS-FE CBN-FA (PRENATAL AD PO)    Take by mouth    PRENATAL VIT-FE FUMARATE-FA (PRENATAL VITAMIN) 27-0.8 MG TABS    Take 1 tablet by mouth daily       ALLERGIES     Patient has no known allergies.     FAMILY HISTORY       Family History   Problem Relation Age of Onset    Cancer Father     Cancer Paternal Aunt     Diabetes Paternal Grandfather           SOCIAL HISTORY       Social History     Socioeconomic History    Marital status: Single     Spouse name: Not on file    Number of children: Not on file    Years of education: Not on file    Highest education level: Not on file   Occupational History    Not on file   Social Needs    Financial resource strain: Not on file    Food insecurity     Worry: Not on file     Inability: Not on file    Transportation needs     Medical: Not on file     Non-medical: Not on file   Tobacco Use    Smoking status: Never Smoker    Smokeless tobacco: Never Used   Substance and Sexual Activity    Alcohol use: No    Drug use: No    Sexual activity: Yes     Partners: Male   Lifestyle    Physical activity     Days per week: Not on file     Minutes per session: Not on file    Stress: Not on file   Relationships    Social connections     Talks on phone: Not on file Gets together: Not on file     Attends [employer] service: Not on file     Active member of club or organization: Not on file     Attends meetings of clubs or organizations: Not on file     Relationship status: Not on file    Intimate partner violence     Fear of current or ex partner: Not on file     Emotionally abused: Not on file     Physically abused: Not on file     Forced sexual activity: Not on file   Other Topics Concern    Not on file   Social History Narrative    Not on file       SCREENINGS             PHYSICAL EXAM    (up to 7 for level 4, 8 or more for level 5)     ED Triage Vitals [11/16/20 1231]   BP Temp Temp src Pulse Resp SpO2 Height Weight   119/79 98.2 °F (36.8 °C) -- 85 16 98 % 5' 3\" (1.6 m) 204 lb (92.5 kg)       Physical Exam  Vitals signs and nursing note reviewed. Constitutional:       General: She is not in acute distress. Appearance: Normal appearance. She is not ill-appearing, toxic-appearing or diaphoretic. HENT:      Head: Normocephalic and atraumatic. Eyes:      Extraocular Movements: Extraocular movements intact. Pupils: Pupils are equal, round, and reactive to light. Neck:      Musculoskeletal: Normal range of motion and neck supple. Cardiovascular:      Rate and Rhythm: Normal rate. Pulses: Normal pulses. Pulmonary:      Effort: Pulmonary effort is normal.      Breath sounds: Normal breath sounds. Abdominal:      General: Abdomen is flat. There is no distension. Palpations: Abdomen is soft. Tenderness: There is no abdominal tenderness. Comments: Gravid. Musculoskeletal: Normal range of motion. General: No swelling or tenderness. Skin:     General: Skin is warm and dry. Neurological:      General: No focal deficit present. Mental Status: She is alert and oriented to person, place, and time. Psychiatric:         Mood and Affect: Mood normal.         Behavior: Behavior normal.         DIAGNOSTIC RESULTS     EKG:  All EKG's are interpreted by the Emergency Department Physician who either signs or Co-signs this chart in the absence of a cardiologist.        RADIOLOGY:   Non-plain film images such as CT, Ultrasound and MRI are read by the radiologist. Rosiland Look images are visualized and preliminarily interpreted by the emergency physician with the below findings:        Interpretation per the Radiologist below, if available at the time of this note:    No orders to display         ED BEDSIDE ULTRASOUND:   Performed by ED Physician -patient with twin gestations both have fetal movement and heart tones on my bedside ultrasound. LABS:  Labs Reviewed   COMPREHENSIVE METABOLIC PANEL - Abnormal; Notable for the following components:       Result Value    Sodium 135 (*)     CO2 21 (*)     BUN 5 (*)     CREATININE 0.4 (*)     All other components within normal limits   CBC WITH AUTO DIFFERENTIAL - Abnormal; Notable for the following components:    MCV 79.9 (*)     Neutrophils % 70.2 (*)     All other components within normal limits   URINE RT REFLEX TO CULTURE - Abnormal; Notable for the following components:    Color, UA DARK YELLOW (*)     Clarity, UA CLOUDY (*)     Ketones, Urine 15 (*)     Protein, UA TRACE (*)     Leukocyte Esterase, Urine SMALL (*)     All other components within normal limits   MICROSCOPIC URINALYSIS - Abnormal; Notable for the following components:    Bacteria, UA 1+ (*)     Crystals, UA NEG (*)     Hyaline Casts, UA 12 (*)     WBC, UA 7 (*)     All other components within normal limits   CULTURE, URINE   LIPASE   PROTIME-INR       All other labs were within normal range or not returned as of this dictation.     EMERGENCY DEPARTMENT COURSE and DIFFERENTIALDIAGNOSIS/MDM:   Vitals:    Vitals:    11/16/20 1231 11/16/20 1400 11/16/20 1500   BP: 119/79 122/74 124/72   Pulse: 85 80 78   Resp: 16 17 18   Temp: 98.2 °F (36.8 °C)     SpO2: 98% 98% 98%   Weight: 204 lb (92.5 kg)     Height: 5' 3\" (1.6 m) MDM  Number of Diagnoses or Management Options  Bacteria in urine:   Mild hyperemesis gravidarum, antepartum:   Twin gestation in first trimester, unspecified multiple gestation type:   Diagnosis management comments: Patient with twin gestation. Mild hyperemesis. Resolved we discussed giving her Zofran here so she would be loopy so she could drive home she was agreeable. She got Zofran she was able to tolerate p.o. Some bacteria in her urine. Will start on Keflex discussed risk and benefits. Patient otherwise able to tolerate p.o. She stable for discharge at this time follow-up with OB/GYN. Amount and/or Complexity of Data Reviewed  Clinical lab tests: ordered and reviewed          CONSULTS:  None    PROCEDURES:  Unless otherwise notedbelow, none     Procedures    FINAL IMPRESSION     1. Mild hyperemesis gravidarum, antepartum    2. Twin gestation in first trimester, unspecified multiple gestation type    3.  Bacteria in urine          DISPOSITION/PLAN   DISPOSITION Decision To Discharge 11/16/2020 04:09:09 PM      PATIENT REFERRED TO:  Tom Hernandez MD  [address]  [address]  [phone]    Schedule an appointment as soon as possible for a visit         DISCHARGE MEDICATIONS:  New Prescriptions    CEPHALEXIN (KEFLEX) 500 MG CAPSULE    Take 1 capsule by mouth 2 times daily for 5 days          (Please note that portions of this note were completed with a voice recognition program.  Efforts were made to edit the dictations butoccasionally words are mis-transcribed.)    Virginia Wills MD (electronically signed)  AttendingEmergency Physician         Virginia Wills MD  11/16/20 5541

## 2020-11-16 NOTE — CARE COORDINATION
SW met with pt at bedside re: No PCP on file. SW asked pt if she was interested in getting a PCP and if so, SW can help her with the process.  Pt stated \"no I don't want one, I never go to the doctor\"

## 2020-11-18 LAB — URINE CULTURE, ROUTINE: NORMAL

## 2020-11-19 ENCOUNTER — TELEPHONE (OUTPATIENT)
Dept: OBGYN | Age: 20
End: 2020-11-19

## 2020-11-19 NOTE — TELEPHONE ENCOUNTER
Pt called stating she is having trouble again keeping fluids down and she was just in the ER this past weekend. I told her if she is worried to please go to the ER for fluids as unfortunately there is not much we could do here in office. Just wanted to let you know. Thanks!

## 2020-11-23 ENCOUNTER — ROUTINE PRENATAL (OUTPATIENT)
Dept: OBGYN | Age: 20
End: 2020-11-23
Payer: MEDICAID

## 2020-11-23 VITALS
WEIGHT: 201 LBS | BODY MASS INDEX: 35.61 KG/M2 | DIASTOLIC BLOOD PRESSURE: 81 MMHG | HEART RATE: 92 BPM | SYSTOLIC BLOOD PRESSURE: 114 MMHG

## 2020-11-23 PROCEDURE — G8484 FLU IMMUNIZE NO ADMIN: HCPCS | Performed by: NURSE PRACTITIONER

## 2020-11-23 PROCEDURE — 1036F TOBACCO NON-USER: CPT | Performed by: NURSE PRACTITIONER

## 2020-11-23 PROCEDURE — G8427 DOCREV CUR MEDS BY ELIG CLIN: HCPCS | Performed by: NURSE PRACTITIONER

## 2020-11-23 PROCEDURE — 99213 OFFICE O/P EST LOW 20 MIN: CPT | Performed by: NURSE PRACTITIONER

## 2020-11-23 PROCEDURE — G8417 CALC BMI ABV UP PARAM F/U: HCPCS | Performed by: NURSE PRACTITIONER

## 2020-11-23 RX ORDER — FLUOXETINE 10 MG/1
10 CAPSULE ORAL DAILY
Qty: 30 CAPSULE | Refills: 3 | Status: SHIPPED | OUTPATIENT
Start: 2020-11-23 | End: 2021-08-31

## 2020-11-23 RX ORDER — SERTRALINE HYDROCHLORIDE 100 MG/1
100 TABLET, FILM COATED ORAL DAILY
COMMUNITY
End: 2020-11-23 | Stop reason: ALTCHOICE

## 2020-11-23 NOTE — PROGRESS NOTES
Patient presents today for routine prenatal visit. Pt denies any vaginal leaking bleeding or contractions. She is changing to Dr. Clint Villatoro because we will not allow visitors. She is unable to keep food or drinks down, was in ER over the weekend. She is also asking to have her depression medicine changed because she thinks that's why shes getting sick . She also states her BP has been running high and her face has been flushed. Julius Aguero is here for a return obstetrical visit. Today she is 11w2d weeks EGA. BP normal at checks in office. She has lost wt since being pregnant. She does use phenergan but in day doesn't bc makes her sleepy. Won't use zofran d/t constipation. Doing Panorama today. Wondering if DNA testing can be done while still pregnant. Stopped her zoloft bc of the n/v but states cries a lot and doesn't have energy to do anything with her other kids. She  does not have vaginal bleeding, leaking of fluid, contractions. She does not have blurred vision, SOB, or increased swelling in legs or face. Pt does not feel fetal movement regularly. O:   Vitals:    11/23/20 1023   BP: 114/81   Pulse: 92   Weight: 201 lb (91.2 kg)     Pt is A&Ox3, in no acute distress. Normocephalic, atraumatic. PERRL. Resp even and non-labored. Skin pink, warm & dry. Gravid abdomen. GONZALEZ's well. Gait steady. See OB flowsheet. A: Normal IUP at 11w2d wks      Diagnosis Orders   1. Dichorionic diamniotic twin pregnancy in first trimester     2. 11 weeks gestation of pregnancy     3. Depression during pregnancy in first trimester  FLUoxetine (PROZAC) 10 MG capsule   4. Nausea and vomiting during pregnancy         P:   Pt counseled on otc n/v meds discussed, WE for missing per her request, to sign release for Perez if desires, will start prozac. call for problems with prozac, benefits and risks discussed and Genetic testing  Continue with routine prenatal care.   RTC in 4 wk for prenatal

## 2020-11-23 NOTE — PATIENT INSTRUCTIONS
Patient Education        Weeks 10 to 14 of Your Pregnancy: Care Instructions  Your Care Instructions     By weeks 10 to 14 of your pregnancy, the placenta has formed inside your uterus. It is possible to hear your baby's heartbeat with a special ultrasound device. Your baby's eyes can and do move. The arms and legs can bend. This is a good time to think about testing for birth defects. There are two types of tests: screening and diagnostic. Screening tests show the chance that a baby has a certain birth defect. They can't tell you for sure that your baby has a problem. Diagnostic tests show if a baby has a certain birth defect. It's your choice whether to have these tests. You and your partner can talk to your doctor or midwife about birth defects tests. Follow-up care is a key part of your treatment and safety. Be sure to make and go to all appointments, and call your doctor if you are having problems. It's also a good idea to know your test results and keep a list of the medicines you take. How can you care for yourself at home? Decide about tests  · You can have screening tests and diagnostic tests to check for birth defects. The decision to have a test for birth defects is personal. Think about your age, your chance of passing on a family disease, your need to know about any problems, and what you might do after you have the test results. ? Triple or quadruple (quad) blood tests. These screening tests can be done between 15 and 20 weeks of pregnancy. They check the amounts of three or four substances in your blood. The doctor looks at these test results, along with your age and other factors, to find out the chance that your baby may have certain problems. ? Amniocentesis. This diagnostic test is used to look for chromosomal problems in the baby's cells.  It can be done between 15 and 20 weeks of pregnancy, usually around week 16.  ? Nuchal translucency test. This test uses ultrasound to measure the thickness of the area at the back of the baby's neck. An increase in the thickness can be an early sign of Down syndrome. ? Chorionic villus sampling (CVS). This is a test that looks for certain genetic problems with your baby. The same genes that are in your baby are in the placenta. A small piece of the placenta is taken out and tested. This test is done when you are 10 to 13 weeks pregnant. Ease discomfort  · Slow down and take naps when you feel tired. · If your emotions swing, talk to someone. Crying, anxiety, and concentration problems are common. · If your gums bleed, try a softer toothbrush. If your gums are puffy and bleed a lot, see your dentist.  · If you feel dizzy:  ? Get up slowly after sitting or lying down. ? Drink plenty of fluids. ? Eat small snacks to keep your blood sugar stable. ? Put your head between your legs as though you were tying your shoelaces. ? Lie down with your legs higher than your head. Use pillows to prop up your feet. · If you have a headache:  ? Lie down. ? Ask your partner or a good friend for a neck massage. ? Try cool cloths over your forehead or across the back of your neck. ? Use acetaminophen (Tylenol) for pain relief. Do not use nonsteroidal anti-inflammatory drugs (NSAIDs), such as ibuprofen (Advil, Motrin) or naproxen (Aleve), unless your doctor says it is okay. · If you have a nosebleed, pinch your nose gently, and hold it for a short while. To prevent nosebleeds, try massaging a small dab of petroleum jelly, such as Vaseline, in your nostrils. · If your nose is stuffed up, try saline (saltwater) nose sprays. Do not use decongestant sprays. Care for your breasts  · Wear a bra that gives you good support. · Know that changes in your breasts are normal.  ? Your breasts may get larger and more tender. Tenderness usually gets better by 12 weeks. ? Your nipples may get darker and larger, and small bumps around your nipples may show more. ?  The veins in your chest and breasts may show more. · Don't worry about \"toughening'\" your nipples. Breastfeeding will naturally do this. Where can you learn more? Go to [email or web address]://[email or web address]. org and sign in to your [employer] account. Enter V295 in the Splashup box to learn more about \"Weeks 10 to 14 of Your Pregnancy: Care Instructions. \"     If you do not have an account, please click on the \"Sign Up Now\" link. Current as of: February 11, 2020               Content Version: 12.6  © 5140-2439 [employer]. Care instructions adapted under license by [facility] ([facility]). If you have questions about a medical condition or this instruction, always ask your healthcare professional. [address] any warranty or liability for your use of this information.

## 2020-12-01 ENCOUNTER — TELEPHONE (OUTPATIENT)
Dept: OBGYN | Age: 20
End: 2020-12-01

## 2021-04-13 ENCOUNTER — HOSPITAL ENCOUNTER (OUTPATIENT)
Facility: HOSPITAL | Age: 21
Setting detail: OBSERVATION
Discharge: HOME | End: 2021-04-13
Attending: OBSTETRICS & GYNECOLOGY | Admitting: OBSTETRICS & GYNECOLOGY

## 2021-04-13 VITALS
HEART RATE: 118 BPM | RESPIRATION RATE: 18 BRPM | DIASTOLIC BLOOD PRESSURE: 76 MMHG | SYSTOLIC BLOOD PRESSURE: 132 MMHG | TEMPERATURE: 97.4 F

## 2021-04-13 PROBLEM — Z34.90 PREGNANCY: Status: ACTIVE | Noted: 2021-04-13

## 2021-04-13 PROCEDURE — G0378 HOSPITAL OBSERVATION PER HR: HCPCS

## 2021-04-13 PROCEDURE — G0463 HOSPITAL OUTPT CLINIC VISIT: HCPCS

## 2021-04-13 RX ORDER — FAMOTIDINE 40 MG/1
40 TABLET, FILM COATED ORAL ONCE AS NEEDED
COMMUNITY
End: 2022-12-05

## 2021-04-13 RX ORDER — LANOLIN ALCOHOL/MO/W.PET/CERES
400 CREAM (GRAM) TOPICAL DAILY
COMMUNITY
End: 2022-12-05

## 2021-04-13 RX ORDER — FERROUS SULFATE 325(65) MG
325 TABLET ORAL
COMMUNITY
End: 2022-12-05

## 2021-04-14 ENCOUNTER — TELEPHONE (OUTPATIENT)
Dept: FAMILY MEDICINE CLINIC | Facility: CLINIC | Age: 21
End: 2021-04-14

## 2021-04-14 NOTE — TELEPHONE ENCOUNTER
Attempted to call patient to set up an appointment to establish care, since she was discharged without a PCP.  She does not have voice mail set up.  Will continue to try to reach her.

## 2021-04-22 ENCOUNTER — APPOINTMENT (OUTPATIENT)
Dept: ULTRASOUND IMAGING | Facility: HOSPITAL | Age: 21
End: 2021-04-22

## 2021-04-22 ENCOUNTER — HOSPITAL ENCOUNTER (OUTPATIENT)
Facility: HOSPITAL | Age: 21
Setting detail: OBSERVATION
Discharge: HOME | End: 2021-04-22
Attending: OBSTETRICS & GYNECOLOGY | Admitting: OBSTETRICS & GYNECOLOGY

## 2021-04-22 VITALS
HEIGHT: 62 IN | HEART RATE: 81 BPM | SYSTOLIC BLOOD PRESSURE: 99 MMHG | BODY MASS INDEX: 41.7 KG/M2 | DIASTOLIC BLOOD PRESSURE: 59 MMHG | RESPIRATION RATE: 18 BRPM | TEMPERATURE: 97.7 F | WEIGHT: 226.6 LBS

## 2021-04-22 LAB
BACTERIA UR QL AUTO: ABNORMAL /HPF
BILIRUB UR QL STRIP: NEGATIVE
CLARITY UR: CLEAR
CLUE CELLS SPEC QL WET PREP: ABNORMAL
COLOR UR: YELLOW
GLUCOSE UR STRIP-MCNC: NEGATIVE MG/DL
HGB UR QL STRIP.AUTO: NEGATIVE
HYALINE CASTS UR QL AUTO: ABNORMAL /LPF
HYDATID CYST SPEC WET PREP: ABNORMAL
KETONES UR QL STRIP: NEGATIVE
LEUKOCYTE ESTERASE UR QL STRIP.AUTO: ABNORMAL
NITRITE UR QL STRIP: NEGATIVE
PH UR STRIP.AUTO: 6.5 [PH] (ref 5–8)
PROT UR QL STRIP: NEGATIVE
RBC # UR: ABNORMAL /HPF
REF LAB TEST METHOD: ABNORMAL
SP GR UR STRIP: <=1.005 (ref 1–1.03)
SQUAMOUS #/AREA URNS HPF: ABNORMAL /HPF
T VAGINALIS SPEC QL WET PREP: ABNORMAL
UROBILINOGEN UR QL STRIP: ABNORMAL
WBC SPEC QL WET PREP: ABNORMAL
WBC UR QL AUTO: ABNORMAL /HPF
YEAST GENITAL QL WET PREP: ABNORMAL

## 2021-04-22 PROCEDURE — G0378 HOSPITAL OBSERVATION PER HR: HCPCS

## 2021-04-22 PROCEDURE — 96361 HYDRATE IV INFUSION ADD-ON: CPT

## 2021-04-22 PROCEDURE — 76815 OB US LIMITED FETUS(S): CPT

## 2021-04-22 PROCEDURE — 96372 THER/PROPH/DIAG INJ SC/IM: CPT

## 2021-04-22 PROCEDURE — G0463 HOSPITAL OUTPT CLINIC VISIT: HCPCS

## 2021-04-22 PROCEDURE — 87086 URINE CULTURE/COLONY COUNT: CPT | Performed by: OBSTETRICS & GYNECOLOGY

## 2021-04-22 PROCEDURE — 87210 SMEAR WET MOUNT SALINE/INK: CPT | Performed by: OBSTETRICS & GYNECOLOGY

## 2021-04-22 PROCEDURE — 63710000001 PROMETHAZINE PER 25 MG: Performed by: OBSTETRICS & GYNECOLOGY

## 2021-04-22 PROCEDURE — 25010000002 TERBUTALINE PER 1 MG: Performed by: OBSTETRICS & GYNECOLOGY

## 2021-04-22 PROCEDURE — 25010000002 CEFAZOLIN PER 500 MG: Performed by: OBSTETRICS & GYNECOLOGY

## 2021-04-22 PROCEDURE — 96365 THER/PROPH/DIAG IV INF INIT: CPT

## 2021-04-22 PROCEDURE — 81001 URINALYSIS AUTO W/SCOPE: CPT | Performed by: OBSTETRICS & GYNECOLOGY

## 2021-04-22 RX ORDER — TERBUTALINE SULFATE 1 MG/ML
0.25 INJECTION, SOLUTION SUBCUTANEOUS ONCE
Status: COMPLETED | OUTPATIENT
Start: 2021-04-22 | End: 2021-04-22

## 2021-04-22 RX ORDER — METRONIDAZOLE 500 MG/1
2000 TABLET ORAL ONCE
Status: COMPLETED | OUTPATIENT
Start: 2021-04-22 | End: 2021-04-22

## 2021-04-22 RX ORDER — CEFAZOLIN SODIUM IN 0.9 % NACL 3 G/100 ML
3 INTRAVENOUS SOLUTION, PIGGYBACK (ML) INTRAVENOUS ONCE
Status: COMPLETED | OUTPATIENT
Start: 2021-04-22 | End: 2021-04-22

## 2021-04-22 RX ORDER — ACETAMINOPHEN 325 MG/1
650 TABLET ORAL ONCE
Status: DISCONTINUED | OUTPATIENT
Start: 2021-04-22 | End: 2021-04-22 | Stop reason: HOSPADM

## 2021-04-22 RX ORDER — SODIUM CHLORIDE 0.9 % (FLUSH) 0.9 %
10 SYRINGE (ML) INJECTION EVERY 12 HOURS SCHEDULED
Status: DISCONTINUED | OUTPATIENT
Start: 2021-04-22 | End: 2021-04-22 | Stop reason: HOSPADM

## 2021-04-22 RX ORDER — SODIUM CHLORIDE 0.9 % (FLUSH) 0.9 %
10 SYRINGE (ML) INJECTION AS NEEDED
Status: DISCONTINUED | OUTPATIENT
Start: 2021-04-22 | End: 2021-04-22 | Stop reason: HOSPADM

## 2021-04-22 RX ORDER — SODIUM CHLORIDE, SODIUM LACTATE, POTASSIUM CHLORIDE, CALCIUM CHLORIDE 600; 310; 30; 20 MG/100ML; MG/100ML; MG/100ML; MG/100ML
125 INJECTION, SOLUTION INTRAVENOUS CONTINUOUS
Status: DISCONTINUED | OUTPATIENT
Start: 2021-04-22 | End: 2021-04-22 | Stop reason: HOSPADM

## 2021-04-22 RX ORDER — PROMETHAZINE HYDROCHLORIDE 25 MG/1
12.5 TABLET ORAL ONCE
Status: COMPLETED | OUTPATIENT
Start: 2021-04-22 | End: 2021-04-22

## 2021-04-22 RX ADMIN — CEFAZOLIN SODIUM 3 G: 10 INJECTION, POWDER, FOR SOLUTION INTRAVENOUS at 04:10

## 2021-04-22 RX ADMIN — TERBUTALINE SULFATE 0.25 MG: 1 INJECTION SUBCUTANEOUS at 08:46

## 2021-04-22 RX ADMIN — METRONIDAZOLE 2000 MG: 500 TABLET ORAL at 04:12

## 2021-04-22 RX ADMIN — SODIUM CHLORIDE, POTASSIUM CHLORIDE, SODIUM LACTATE AND CALCIUM CHLORIDE 125 ML/HR: 600; 310; 30; 20 INJECTION, SOLUTION INTRAVENOUS at 06:15

## 2021-04-22 RX ADMIN — SODIUM CHLORIDE, POTASSIUM CHLORIDE, SODIUM LACTATE AND CALCIUM CHLORIDE 125 ML/HR: 600; 310; 30; 20 INJECTION, SOLUTION INTRAVENOUS at 04:06

## 2021-04-22 RX ADMIN — PROMETHAZINE HYDROCHLORIDE 12.5 MG: 25 TABLET ORAL at 05:30

## 2021-04-22 NOTE — CONSULTS
Prenatal Consult  Referring OB:  Dr. Awad  Active Problems:    32 week gestation with twins  Plan: Expectant management per OB      Reason for Consultation: potential premature twins at 32 weeks gestation    Maternal History:   First last name is a 21 y.o. yr/o  with:  labor.  The  EDC is  Estimated Date of Delivery: Estimated Date of Delivery: 21    Consult:  mother available for discussion.  We reviewed the fetal and  aspects of the above conditions to include:  respiratory distress syndrome, temperature instability, jaundice and feeding difficulties.    Estimated length of stay: MARITZA  Discussed the importance of providing human milk for pre-term and late pre-term infants: yes    We will plan to attend delivery when requested.    Plan for  resuscitation: full resuscitation    Additional comments: I gave mother a 1 page handout on the major risks of prematurity. She was sleepy and did not have questions. She stated she has a friend who had premie twins and talked with her. I let her know that we would be happy to come back and answer any questions she may have.    Thank you for allowing us to participate in the care of your patient.       21  09:16 CDT      20 minutes were spent in consultation, greater than 50% face to face time.   Shannon Mehta, KELSIE  21  13:48 CDT

## 2021-04-22 NOTE — NURSING NOTE
"Pt states, \"I don't want that Tylenol, it doesn't help me. I just want to go home and get in my bed.\" Pt denies contractions and again requests to go home. Updated MD on unchanged cervical exam and pt desire to go home. MD orders pt to stay for monitoring until 11:15, 4 hours after MD's original cervical exam.   "

## 2021-04-22 NOTE — H&P
" Connie Rosa  : 2000  MRN: [id number]  CSN: [id number]    History and Physical    Subjective   Yasmin Rosa is a 21 y.o. year old  with an Estimated Date of Delivery: 21 currently at 32w5d presenting with irregular contractions.    Prenatal care has been with Dr. Tomas Dawkins.  It has been complicated by multiple gestation (twins). NO prenatal records currently available. Patient reports that she previously received betmethasone.     OB History    Para Term  AB Living   4 2 2 0 1 2   SAB TAB Ectopic Molar Multiple Live Births   1 0 0 0 1 2      # Outcome Date GA Lbr Geo/2nd Weight Sex Delivery Anes PTL Lv   4A             4B Current            3 Term 20    M Vag-Spont EPI  JACOB   2 SAB 2019 5w0d          1 Term 16    M Vag-Spont EPI  JACOB     Past Medical History:   Diagnosis Date   • MRSA infection     abdomen    • Ovarian cyst    • Vaginal delivery      Past Surgical History:   Procedure Laterality Date   • CHOLECYSTECTOMY     • KNEE RECONSTRUCTION, MEDIAL PATELLAR FEMORAL LIGAMENT     • KNEE SURGERY     • OVARIAN CYST SURGERY         Current Facility-Administered Medications:   •  lactated ringers infusion, 125 mL/hr, Intravenous, Continuous, Awad, Elizabeth, DO, Last Rate: 125 mL/hr at 21 0615, 125 mL/hr at 21 0615  •  sodium chloride 0.9 % flush 10 mL, 10 mL, Intravenous, Q12H, Awad, Elizabeth, DO  •  sodium chloride 0.9 % flush 10 mL, 10 mL, Intravenous, PRN, Awad, Elizabeth, DO    No Known Allergies  Social History    Tobacco Use      Smoking status: Never Smoker      Smokeless tobacco: Never Used    Review of Systems   Genitourinary: Positive for pelvic pain. Negative for vaginal bleeding and vaginal discharge.         Objective   /97 (BP Location: Right arm, Patient Position: Lying)   Pulse 95   Temp 97.7 °F (36.5 °C) (Oral)   Resp 18   Ht 157.5 cm (62\")   Wt 103 kg (226 lb 9.6 oz)   " Breastfeeding Yes   BMI 41.45 kg/m²   General: well developed; well nourished  no acute distress   Heart: Not performed.   Lungs: breathing is unlabored   Abdomen: soft, non-tender; no masses  no umbilical or inguinal hernias are present  no hepato-splenomegaly   FHT's: reactive, reassuring, x2   Cervix: was checked (by me): 3-4 cm / 60 % / -3, previously checked and patient was 2-3 by RN     Presentation: Twin A reportedly breech getting sono for presentation    Contractions: irregular - external monitors used     Prenatal Labs  Lab Results   Component Value Date    HGB 12.2 10/23/2020    HEPBSAG Non-Reactive 06/05/2020    ABORH B POS 06/04/2020    ABSCRN NEG 06/04/2020    URINECX 20,000-30,000 CFU/mL Mixed Gram Positive Azra (A) 03/10/2017       Current Labs Reviewed   No data reviewed       Assessment   1. IUP at 32w5d  2. Group B strep status: unknown  3. Twin     Plan   1. Expectant management   2. NPO   3. US for presentation     Elizabeth Awad DO  4/22/2021  07:56 CDT

## 2021-04-23 LAB — BACTERIA SPEC AEROBE CULT: NORMAL

## 2021-04-30 ENCOUNTER — HOSPITAL ENCOUNTER (OUTPATIENT)
Facility: HOSPITAL | Age: 21
Setting detail: OBSERVATION
Discharge: HOME | End: 2021-04-30
Attending: OBSTETRICS & GYNECOLOGY | Admitting: OBSTETRICS & GYNECOLOGY

## 2021-04-30 VITALS
RESPIRATION RATE: 18 BRPM | HEIGHT: 62 IN | TEMPERATURE: 98 F | HEART RATE: 106 BPM | DIASTOLIC BLOOD PRESSURE: 77 MMHG | SYSTOLIC BLOOD PRESSURE: 126 MMHG | WEIGHT: 224.8 LBS | BODY MASS INDEX: 41.37 KG/M2

## 2021-04-30 PROCEDURE — G0378 HOSPITAL OBSERVATION PER HR: HCPCS

## 2021-04-30 PROCEDURE — G0463 HOSPITAL OUTPT CLINIC VISIT: HCPCS

## 2021-05-05 ENCOUNTER — HOSPITAL ENCOUNTER (OUTPATIENT)
Facility: HOSPITAL | Age: 21
Discharge: HOME | End: 2021-05-06
Attending: OBSTETRICS & GYNECOLOGY | Admitting: OBSTETRICS & GYNECOLOGY

## 2021-05-06 VITALS
TEMPERATURE: 98.2 F | HEART RATE: 96 BPM | BODY MASS INDEX: 41.55 KG/M2 | RESPIRATION RATE: 18 BRPM | SYSTOLIC BLOOD PRESSURE: 136 MMHG | DIASTOLIC BLOOD PRESSURE: 73 MMHG | WEIGHT: 225.8 LBS | HEIGHT: 62 IN

## 2021-05-06 PROCEDURE — G0378 HOSPITAL OBSERVATION PER HR: HCPCS

## 2021-05-06 PROCEDURE — G0463 HOSPITAL OUTPT CLINIC VISIT: HCPCS

## 2021-05-07 ENCOUNTER — HOSPITAL ENCOUNTER (OUTPATIENT)
Facility: HOSPITAL | Age: 21
Discharge: HOME | End: 2021-05-07
Attending: OBSTETRICS & GYNECOLOGY | Admitting: OBSTETRICS & GYNECOLOGY

## 2021-05-07 VITALS
RESPIRATION RATE: 18 BRPM | TEMPERATURE: 98.2 F | HEART RATE: 106 BPM | SYSTOLIC BLOOD PRESSURE: 122 MMHG | DIASTOLIC BLOOD PRESSURE: 82 MMHG

## 2021-05-07 PROCEDURE — G0378 HOSPITAL OBSERVATION PER HR: HCPCS

## 2021-05-07 PROCEDURE — G0463 HOSPITAL OUTPT CLINIC VISIT: HCPCS

## 2021-05-09 ENCOUNTER — HOSPITAL ENCOUNTER (OUTPATIENT)
Facility: HOSPITAL | Age: 21
Discharge: HOME | End: 2021-05-09
Attending: OBSTETRICS & GYNECOLOGY | Admitting: OBSTETRICS & GYNECOLOGY

## 2021-05-09 VITALS
DIASTOLIC BLOOD PRESSURE: 50 MMHG | SYSTOLIC BLOOD PRESSURE: 109 MMHG | HEART RATE: 117 BPM | TEMPERATURE: 98.9 F | RESPIRATION RATE: 18 BRPM

## 2021-05-09 PROCEDURE — G0378 HOSPITAL OBSERVATION PER HR: HCPCS

## 2021-05-09 PROCEDURE — 59025 FETAL NON-STRESS TEST: CPT

## 2021-05-09 PROCEDURE — G0463 HOSPITAL OUTPT CLINIC VISIT: HCPCS

## 2021-05-10 ENCOUNTER — ANESTHESIA EVENT (OUTPATIENT)
Dept: LABOR AND DELIVERY | Facility: HOSPITAL | Age: 21
End: 2021-05-10

## 2021-05-10 ENCOUNTER — HOSPITAL ENCOUNTER (INPATIENT)
Facility: HOSPITAL | Age: 21
LOS: 3 days | Discharge: HOME | End: 2021-05-13
Attending: OBSTETRICS & GYNECOLOGY | Admitting: OBSTETRICS & GYNECOLOGY

## 2021-05-10 ENCOUNTER — ANESTHESIA (OUTPATIENT)
Dept: LABOR AND DELIVERY | Facility: HOSPITAL | Age: 21
End: 2021-05-10

## 2021-05-10 LAB
ABO GROUP BLD: NORMAL
BLD GP AB SCN SERPL QL: NEGATIVE
DEPRECATED RDW RBC AUTO: 32.2 FL (ref 37–54)
ERYTHROCYTE [DISTWIDTH] IN BLOOD BY AUTOMATED COUNT: 12.5 % (ref 12.3–15.4)
HCT VFR BLD AUTO: 30.6 % (ref 34–46.6)
HGB BLD-MCNC: 10.3 G/DL (ref 12–15.9)
MCH RBC QN AUTO: 24.3 PG (ref 26.6–33)
MCHC RBC AUTO-ENTMCNC: 33.7 G/DL (ref 31.5–35.7)
MCV RBC AUTO: 72.2 FL (ref 79–97)
PLATELET # BLD AUTO: 225 10*3/MM3 (ref 140–450)
PMV BLD AUTO: 10.9 FL (ref 6–12)
RBC # BLD AUTO: 4.24 10*6/MM3 (ref 3.77–5.28)
RH BLD: POSITIVE
T&S EXPIRATION DATE: NORMAL
WBC # BLD AUTO: 7.47 10*3/MM3 (ref 3.4–10.8)

## 2021-05-10 PROCEDURE — 25010000002 HYDROMORPHONE 1 MG/ML SOLUTION: Performed by: NURSE ANESTHETIST, CERTIFIED REGISTERED

## 2021-05-10 PROCEDURE — 51702 INSERT TEMP BLADDER CATH: CPT

## 2021-05-10 PROCEDURE — 86900 BLOOD TYPING SEROLOGIC ABO: CPT | Performed by: OBSTETRICS & GYNECOLOGY

## 2021-05-10 PROCEDURE — 85027 COMPLETE CBC AUTOMATED: CPT | Performed by: OBSTETRICS & GYNECOLOGY

## 2021-05-10 PROCEDURE — 94799 UNLISTED PULMONARY SVC/PX: CPT

## 2021-05-10 PROCEDURE — 25010000002 DEXAMETHASONE PER 1 MG: Performed by: NURSE ANESTHETIST, CERTIFIED REGISTERED

## 2021-05-10 PROCEDURE — 88307 TISSUE EXAM BY PATHOLOGIST: CPT | Performed by: OBSTETRICS & GYNECOLOGY

## 2021-05-10 PROCEDURE — 25010000002 CEFAZOLIN PER 500 MG: Performed by: OBSTETRICS & GYNECOLOGY

## 2021-05-10 PROCEDURE — 86901 BLOOD TYPING SEROLOGIC RH(D): CPT | Performed by: OBSTETRICS & GYNECOLOGY

## 2021-05-10 PROCEDURE — 25010000002 PROMETHAZINE PER 50 MG: Performed by: OBSTETRICS & GYNECOLOGY

## 2021-05-10 PROCEDURE — 86850 RBC ANTIBODY SCREEN: CPT | Performed by: OBSTETRICS & GYNECOLOGY

## 2021-05-10 RX ORDER — BUPIVACAINE HYDROCHLORIDE 7.5 MG/ML
INJECTION, SOLUTION EPIDURAL; RETROBULBAR
Status: COMPLETED | OUTPATIENT
Start: 2021-05-10 | End: 2021-05-10

## 2021-05-10 RX ORDER — LIDOCAINE HYDROCHLORIDE 10 MG/ML
5 INJECTION, SOLUTION EPIDURAL; INFILTRATION; INTRACAUDAL; PERINEURAL AS NEEDED
Status: DISCONTINUED | OUTPATIENT
Start: 2021-05-10 | End: 2021-05-10 | Stop reason: HOSPADM

## 2021-05-10 RX ORDER — SODIUM CHLORIDE, SODIUM LACTATE, POTASSIUM CHLORIDE, CALCIUM CHLORIDE 600; 310; 30; 20 MG/100ML; MG/100ML; MG/100ML; MG/100ML
125 INJECTION, SOLUTION INTRAVENOUS CONTINUOUS
Status: DISCONTINUED | OUTPATIENT
Start: 2021-05-10 | End: 2021-05-13 | Stop reason: HOSPADM

## 2021-05-10 RX ORDER — KETAMINE HYDROCHLORIDE 50 MG/ML
INJECTION, SOLUTION, CONCENTRATE INTRAMUSCULAR; INTRAVENOUS AS NEEDED
Status: DISCONTINUED | OUTPATIENT
Start: 2021-05-10 | End: 2021-05-10 | Stop reason: SURG

## 2021-05-10 RX ORDER — DEXAMETHASONE SODIUM PHOSPHATE 4 MG/ML
INJECTION, SOLUTION INTRA-ARTICULAR; INTRALESIONAL; INTRAMUSCULAR; INTRAVENOUS; SOFT TISSUE AS NEEDED
Status: DISCONTINUED | OUTPATIENT
Start: 2021-05-10 | End: 2021-05-10 | Stop reason: SURG

## 2021-05-10 RX ORDER — NALOXONE HCL 0.4 MG/ML
0.4 VIAL (ML) INJECTION
Status: ACTIVE | OUTPATIENT
Start: 2021-05-10 | End: 2021-05-11

## 2021-05-10 RX ORDER — LIDOCAINE 50 MG/G
PATCH TOPICAL AS NEEDED
Status: DISCONTINUED | OUTPATIENT
Start: 2021-05-10 | End: 2021-05-13 | Stop reason: HOSPADM

## 2021-05-10 RX ORDER — TRISODIUM CITRATE DIHYDRATE AND CITRIC ACID MONOHYDRATE 500; 334 MG/5ML; MG/5ML
15 SOLUTION ORAL ONCE
Status: COMPLETED | OUTPATIENT
Start: 2021-05-10 | End: 2021-05-10

## 2021-05-10 RX ORDER — SODIUM CHLORIDE 0.9 % (FLUSH) 0.9 %
3 SYRINGE (ML) INJECTION EVERY 12 HOURS SCHEDULED
Status: DISCONTINUED | OUTPATIENT
Start: 2021-05-10 | End: 2021-05-10 | Stop reason: HOSPADM

## 2021-05-10 RX ORDER — SODIUM CHLORIDE 0.9 % (FLUSH) 0.9 %
10 SYRINGE (ML) INJECTION AS NEEDED
Status: DISCONTINUED | OUTPATIENT
Start: 2021-05-10 | End: 2021-05-10 | Stop reason: HOSPADM

## 2021-05-10 RX ORDER — FAMOTIDINE 10 MG/ML
20 INJECTION, SOLUTION INTRAVENOUS ONCE AS NEEDED
Status: COMPLETED | OUTPATIENT
Start: 2021-05-10 | End: 2021-05-10

## 2021-05-10 RX ORDER — OXYTOCIN 10 [USP'U]/ML
INJECTION, SOLUTION INTRAMUSCULAR; INTRAVENOUS AS NEEDED
Status: DISCONTINUED | OUTPATIENT
Start: 2021-05-10 | End: 2021-05-10 | Stop reason: SURG

## 2021-05-10 RX ORDER — ONDANSETRON 2 MG/ML
4 INJECTION INTRAMUSCULAR; INTRAVENOUS EVERY 6 HOURS PRN
Status: ACTIVE | OUTPATIENT
Start: 2021-05-10 | End: 2021-05-11

## 2021-05-10 RX ORDER — BUPIVACAINE HCL/0.9 % NACL/PF 0.1 %
2 PLASTIC BAG, INJECTION (ML) EPIDURAL ONCE
Status: COMPLETED | OUTPATIENT
Start: 2021-05-10 | End: 2021-05-10

## 2021-05-10 RX ORDER — OXYTOCIN/0.9 % SODIUM CHLORIDE 30/500 ML
250 PLASTIC BAG, INJECTION (ML) INTRAVENOUS CONTINUOUS
Status: ACTIVE | OUTPATIENT
Start: 2021-05-10 | End: 2021-05-10

## 2021-05-10 RX ORDER — HYDROMORPHONE HYDROCHLORIDE 1 MG/ML
0.5 INJECTION, SOLUTION INTRAMUSCULAR; INTRAVENOUS; SUBCUTANEOUS
Status: DISCONTINUED | OUTPATIENT
Start: 2021-05-10 | End: 2021-05-10 | Stop reason: HOSPADM

## 2021-05-10 RX ORDER — BUTORPHANOL TARTRATE 1 MG/ML
0.5 INJECTION, SOLUTION INTRAMUSCULAR; INTRAVENOUS EVERY 6 HOURS PRN
Status: DISPENSED | OUTPATIENT
Start: 2021-05-10 | End: 2021-05-11

## 2021-05-10 RX ORDER — METHYLERGONOVINE MALEATE 0.2 MG/ML
200 INJECTION INTRAVENOUS AS NEEDED
Status: DISCONTINUED | OUTPATIENT
Start: 2021-05-10 | End: 2021-05-10 | Stop reason: HOSPADM

## 2021-05-10 RX ORDER — MISOPROSTOL 200 UG/1
800 TABLET ORAL AS NEEDED
Status: DISCONTINUED | OUTPATIENT
Start: 2021-05-10 | End: 2021-05-10 | Stop reason: HOSPADM

## 2021-05-10 RX ORDER — OXYTOCIN/0.9 % SODIUM CHLORIDE 30/500 ML
125 PLASTIC BAG, INJECTION (ML) INTRAVENOUS CONTINUOUS PRN
Status: COMPLETED | OUTPATIENT
Start: 2021-05-10 | End: 2021-05-10

## 2021-05-10 RX ORDER — PROMETHAZINE HYDROCHLORIDE 25 MG/1
12.5 TABLET ORAL EVERY 6 HOURS PRN
Status: DISCONTINUED | OUTPATIENT
Start: 2021-05-10 | End: 2021-05-10 | Stop reason: HOSPADM

## 2021-05-10 RX ORDER — LIDOCAINE 50 MG/G
1 PATCH TOPICAL
Status: DISCONTINUED | OUTPATIENT
Start: 2021-05-10 | End: 2021-05-13 | Stop reason: HOSPADM

## 2021-05-10 RX ORDER — CARBOPROST TROMETHAMINE 250 UG/ML
250 INJECTION, SOLUTION INTRAMUSCULAR AS NEEDED
Status: DISCONTINUED | OUTPATIENT
Start: 2021-05-10 | End: 2021-05-10 | Stop reason: HOSPADM

## 2021-05-10 RX ORDER — ONDANSETRON 2 MG/ML
4 INJECTION INTRAMUSCULAR; INTRAVENOUS EVERY 6 HOURS PRN
Status: DISCONTINUED | OUTPATIENT
Start: 2021-05-10 | End: 2021-05-10 | Stop reason: HOSPADM

## 2021-05-10 RX ORDER — OXYTOCIN/0.9 % SODIUM CHLORIDE 30/500 ML
999 PLASTIC BAG, INJECTION (ML) INTRAVENOUS ONCE
Status: DISCONTINUED | OUTPATIENT
Start: 2021-05-10 | End: 2021-05-10 | Stop reason: HOSPADM

## 2021-05-10 RX ORDER — OXYCODONE AND ACETAMINOPHEN 7.5; 325 MG/1; MG/1
2 TABLET ORAL EVERY 4 HOURS PRN
Status: DISPENSED | OUTPATIENT
Start: 2021-05-10 | End: 2021-05-11

## 2021-05-10 RX ORDER — OXYCODONE AND ACETAMINOPHEN 7.5; 325 MG/1; MG/1
1 TABLET ORAL EVERY 4 HOURS PRN
Status: ACTIVE | OUTPATIENT
Start: 2021-05-10 | End: 2021-05-11

## 2021-05-10 RX ORDER — PROMETHAZINE HYDROCHLORIDE 12.5 MG/1
12.5 SUPPOSITORY RECTAL EVERY 6 HOURS PRN
Status: DISCONTINUED | OUTPATIENT
Start: 2021-05-10 | End: 2021-05-10 | Stop reason: HOSPADM

## 2021-05-10 RX ORDER — ONDANSETRON 4 MG/1
4 TABLET, FILM COATED ORAL EVERY 6 HOURS PRN
Status: DISCONTINUED | OUTPATIENT
Start: 2021-05-10 | End: 2021-05-10 | Stop reason: HOSPADM

## 2021-05-10 RX ADMIN — HYDROMORPHONE HYDROCHLORIDE 900 MCG: 1 INJECTION, SOLUTION INTRAMUSCULAR; INTRAVENOUS; SUBCUTANEOUS at 16:22

## 2021-05-10 RX ADMIN — SODIUM CITRATE AND CITRIC ACID MONOHYDRATE 15 ML: 500; 334 SOLUTION ORAL at 15:11

## 2021-05-10 RX ADMIN — OXYTOCIN 30 UNITS: 10 INJECTION, SOLUTION INTRAMUSCULAR; INTRAVENOUS at 16:16

## 2021-05-10 RX ADMIN — SODIUM CHLORIDE, POTASSIUM CHLORIDE, SODIUM LACTATE AND CALCIUM CHLORIDE 125 ML/HR: 600; 310; 30; 20 INJECTION, SOLUTION INTRAVENOUS at 21:42

## 2021-05-10 RX ADMIN — PROMETHAZINE HYDROCHLORIDE 12.5 MG: 25 INJECTION INTRAMUSCULAR; INTRAVENOUS at 21:49

## 2021-05-10 RX ADMIN — DEXAMETHASONE SODIUM PHOSPHATE 8 MG: 4 INJECTION, SOLUTION INTRAMUSCULAR; INTRAVENOUS at 16:41

## 2021-05-10 RX ADMIN — KETAMINE HYDROCHLORIDE 100 MG: 50 INJECTION, SOLUTION INTRAMUSCULAR; INTRAVENOUS at 16:09

## 2021-05-10 RX ADMIN — OXYTOCIN 20 UNITS: 10 INJECTION, SOLUTION INTRAMUSCULAR; INTRAVENOUS at 16:26

## 2021-05-10 RX ADMIN — FAMOTIDINE 20 MG: 10 INJECTION INTRAVENOUS at 15:11

## 2021-05-10 RX ADMIN — SODIUM CHLORIDE, POTASSIUM CHLORIDE, SODIUM LACTATE AND CALCIUM CHLORIDE: 600; 310; 30; 20 INJECTION, SOLUTION INTRAVENOUS at 16:25

## 2021-05-10 RX ADMIN — SODIUM CHLORIDE, POTASSIUM CHLORIDE, SODIUM LACTATE AND CALCIUM CHLORIDE 125 ML/HR: 600; 310; 30; 20 INJECTION, SOLUTION INTRAVENOUS at 14:05

## 2021-05-10 RX ADMIN — LIDOCAINE 1 PATCH: 50 PATCH CUTANEOUS at 16:45

## 2021-05-10 RX ADMIN — HYDROMORPHONE HYDROCHLORIDE 100 MCG: 1 INJECTION, SOLUTION INTRAMUSCULAR; INTRAVENOUS; SUBCUTANEOUS at 15:56

## 2021-05-10 RX ADMIN — BUPIVACAINE HYDROCHLORIDE 1.5 ML: 7.5 INJECTION, SOLUTION EPIDURAL; RETROBULBAR at 15:56

## 2021-05-10 RX ADMIN — Medication 2 G: at 16:00

## 2021-05-10 RX ADMIN — OXYTOCIN-SODIUM CHLORIDE 0.9% IV SOLN 30 UNIT/500ML 125 ML/HR: 30-0.9/5 SOLUTION at 18:43

## 2021-05-10 NOTE — ANESTHESIA PREPROCEDURE EVALUATION
Anesthesia Evaluation     no history of anesthetic complications:  NPO Solid Status: > 8 hours  NPO Liquid Status: > 8 hours           Airway   Mallampati: I  TM distance: >3 FB  Neck ROM: full  No difficulty expected  Dental - normal exam     Pulmonary - negative pulmonary ROS   Cardiovascular - negative cardio ROS        Neuro/Psych- negative ROS  GI/Hepatic/Renal/Endo    (+)  GERD well controlled,      Musculoskeletal (-) negative ROS    Abdominal    Substance History - negative use     OB/GYN    (+) Pregnant,         Other - negative ROS                       Anesthesia Plan    ASA 1     spinal       Anesthetic plan, all risks, benefits, and alternatives have been provided, discussed and informed consent has been obtained with: patient.

## 2021-05-10 NOTE — ANESTHESIA PROCEDURE NOTES
Spinal Block      Patient reassessed immediately prior to procedure    Patient location during procedure: OB  Indication:at surgeon's request, post-op pain management and procedure for pain  Performed By  CRNA: Yo Calderón CRNA  Preanesthetic Checklist  Completed: patient identified, IV checked, site marked, risks and benefits discussed, surgical consent, monitors and equipment checked, pre-op evaluation and timeout performed  Spinal Block Prep:  Patient Position:sitting  Sterile Tech:cap, gloves and mask  Prep:DuraPrep  Patient Monitoring:blood pressure monitoring, continuous pulse oximetry and EKG  Spinal Block Procedure  Approach:midline  Guidance:landmark technique and palpation technique  Location:L3-L4  Needle Type:Sprotte  Needle Gauge:24 G  Placement of Spinal needle event:cerebrospinal fluid aspirated  Paresthesia: no  Fluid Appearance:clear  Medications: bupivacaine PF (MARCAINE) 0.75%, 1.5 mL  Med Administered at 5/10/2021 3:56 PM   Post Assessment  Patient Tolerance:patient tolerated the procedure well with no apparent complications  Complications no

## 2021-05-11 LAB
BASOPHILS # BLD AUTO: 0.01 10*3/MM3 (ref 0–0.2)
BASOPHILS NFR BLD AUTO: 0.1 % (ref 0–1.5)
DEPRECATED RDW RBC AUTO: 34 FL (ref 37–54)
EOSINOPHIL # BLD AUTO: 0 10*3/MM3 (ref 0–0.4)
EOSINOPHIL NFR BLD AUTO: 0 % (ref 0.3–6.2)
ERYTHROCYTE [DISTWIDTH] IN BLOOD BY AUTOMATED COUNT: 12.6 % (ref 12.3–15.4)
HCT VFR BLD AUTO: 31 % (ref 34–46.6)
HGB BLD-MCNC: 9.8 G/DL (ref 12–15.9)
LYMPHOCYTES # BLD AUTO: 1.51 10*3/MM3 (ref 0.7–3.1)
LYMPHOCYTES NFR BLD AUTO: 11.4 % (ref 19.6–45.3)
MCH RBC QN AUTO: 23.7 PG (ref 26.6–33)
MCHC RBC AUTO-ENTMCNC: 31.6 G/DL (ref 31.5–35.7)
MCV RBC AUTO: 74.9 FL (ref 79–97)
MONOCYTES # BLD AUTO: 0.57 10*3/MM3 (ref 0.1–0.9)
MONOCYTES NFR BLD AUTO: 4.3 % (ref 5–12)
NEUTROPHILS NFR BLD AUTO: 11.13 10*3/MM3 (ref 1.7–7)
NEUTROPHILS NFR BLD AUTO: 83.8 % (ref 42.7–76)
PLATELET # BLD AUTO: 238 10*3/MM3 (ref 140–450)
PMV BLD AUTO: 10.9 FL (ref 6–12)
RBC # BLD AUTO: 4.14 10*6/MM3 (ref 3.77–5.28)
WBC # BLD AUTO: 13.27 10*3/MM3 (ref 3.4–10.8)

## 2021-05-11 PROCEDURE — 25010000002 BUTORPHANOL PER 1 MG: Performed by: NURSE ANESTHETIST, CERTIFIED REGISTERED

## 2021-05-11 PROCEDURE — 94799 UNLISTED PULMONARY SVC/PX: CPT

## 2021-05-11 PROCEDURE — 85025 COMPLETE CBC W/AUTO DIFF WBC: CPT | Performed by: OBSTETRICS & GYNECOLOGY

## 2021-05-11 RX ORDER — OXYCODONE HYDROCHLORIDE AND ACETAMINOPHEN 5; 325 MG/1; MG/1
1 TABLET ORAL EVERY 4 HOURS PRN
Status: DISCONTINUED | OUTPATIENT
Start: 2021-05-11 | End: 2021-05-13 | Stop reason: HOSPADM

## 2021-05-11 RX ORDER — IBUPROFEN 800 MG/1
800 TABLET ORAL EVERY 8 HOURS PRN
Status: DISCONTINUED | OUTPATIENT
Start: 2021-05-11 | End: 2021-05-13 | Stop reason: HOSPADM

## 2021-05-11 RX ORDER — OXYCODONE AND ACETAMINOPHEN 10; 325 MG/1; MG/1
1 TABLET ORAL EVERY 6 HOURS PRN
Status: DISCONTINUED | OUTPATIENT
Start: 2021-05-11 | End: 2021-05-13 | Stop reason: HOSPADM

## 2021-05-11 RX ADMIN — IBUPROFEN 800 MG: 800 TABLET, FILM COATED ORAL at 19:58

## 2021-05-11 RX ADMIN — LIDOCAINE 1 PATCH: 50 PATCH CUTANEOUS at 09:39

## 2021-05-11 RX ADMIN — BUTORPHANOL TARTRATE 0.5 MG: 1 INJECTION, SOLUTION INTRAMUSCULAR; INTRAVENOUS at 09:39

## 2021-05-11 RX ADMIN — OXYCODONE HYDROCHLORIDE AND ACETAMINOPHEN 2 TABLET: 7.5; 325 TABLET ORAL at 19:23

## 2021-05-11 NOTE — ANESTHESIA POSTPROCEDURE EVALUATION
"Patient: Yasmin Rosa    Procedure Summary     Date: 05/10/21 Room / Location: [facility] LABOR DELIVERY 2 /  PAD LABOR DELIVERY    Anesthesia Start: 1543 Anesthesia Stop:     Procedure:  SECTION PRIMARY (N/A Abdomen) Diagnosis:     Surgeons: Tomas Dawkins MD Provider: Yo Calderón CRNA    Anesthesia Type: spinal ASA Status: 1          Anesthesia Type: spinal    Vitals  Vitals Value Taken Time   /72 21 0738   Temp 98.4 °F (36.9 °C) 21 0738   Pulse 74 21 0738   Resp 18 21 0738   SpO2 99 % 21 0738           Post Anesthesia Care and Evaluation    Patient location during evaluation: floor  Patient participation: complete - patient participated  Level of consciousness: awake and alert  Pain management: adequate  Airway patency: patent  Anesthetic complications: No anesthetic complications    Cardiovascular status: acceptable  Respiratory status: acceptable  Hydration status: acceptable  Post Neuraxial Block status: Motor and sensory function returned to baseline and No signs or symptoms of PDPH  Comments: Blood pressure 112/72, pulse 74, temperature 98.4 °F (36.9 °C), temperature source Oral, resp. rate 18, height 157.5 cm (62\"), weight 102 kg (225 lb), SpO2 99 %, currently breastfeeding.    Pt discharged from PACU based on janusz score >8      "

## 2021-05-12 RX ADMIN — OXYCODONE HYDROCHLORIDE AND ACETAMINOPHEN 1 TABLET: 10; 325 TABLET ORAL at 16:10

## 2021-05-12 RX ADMIN — OXYCODONE HYDROCHLORIDE AND ACETAMINOPHEN 1 TABLET: 10; 325 TABLET ORAL at 22:33

## 2021-05-12 RX ADMIN — OXYCODONE HYDROCHLORIDE AND ACETAMINOPHEN 1 TABLET: 10; 325 TABLET ORAL at 08:38

## 2021-05-12 RX ADMIN — IBUPROFEN 800 MG: 800 TABLET, FILM COATED ORAL at 22:33

## 2021-05-12 RX ADMIN — LIDOCAINE 1 PATCH: 50 PATCH CUTANEOUS at 08:34

## 2021-05-12 RX ADMIN — OXYCODONE HYDROCHLORIDE AND ACETAMINOPHEN 1 TABLET: 5; 325 TABLET ORAL at 01:38

## 2021-05-12 RX ADMIN — IBUPROFEN 800 MG: 800 TABLET, FILM COATED ORAL at 08:38

## 2021-05-13 VITALS
OXYGEN SATURATION: 97 % | RESPIRATION RATE: 18 BRPM | SYSTOLIC BLOOD PRESSURE: 121 MMHG | BODY MASS INDEX: 41.41 KG/M2 | HEART RATE: 79 BPM | WEIGHT: 225 LBS | HEIGHT: 62 IN | TEMPERATURE: 97.9 F | DIASTOLIC BLOOD PRESSURE: 66 MMHG

## 2021-05-13 PROBLEM — Z34.90 PREGNANCY: Status: RESOLVED | Noted: 2021-04-13 | Resolved: 2021-05-13

## 2021-05-13 RX ORDER — IBUPROFEN 800 MG/1
800 TABLET ORAL EVERY 8 HOURS PRN
Qty: 20 TABLET | Refills: 0 | Status: SHIPPED | OUTPATIENT
Start: 2021-05-13 | End: 2022-12-05

## 2021-05-13 RX ORDER — OXYCODONE HYDROCHLORIDE AND ACETAMINOPHEN 5; 325 MG/1; MG/1
1 TABLET ORAL EVERY 6 HOURS PRN
Qty: 20 TABLET | Refills: 0 | Status: SHIPPED | OUTPATIENT
Start: 2021-05-13 | End: 2022-12-05

## 2021-05-13 RX ORDER — LIDOCAINE 50 MG/G
1 PATCH TOPICAL EVERY 24 HOURS
Qty: 6 PATCH | Refills: 0 | Status: SHIPPED | OUTPATIENT
Start: 2021-05-13 | End: 2021-05-19

## 2021-05-13 RX ADMIN — OXYCODONE HYDROCHLORIDE AND ACETAMINOPHEN 1 TABLET: 10; 325 TABLET ORAL at 10:20

## 2021-05-13 RX ADMIN — IBUPROFEN 800 MG: 800 TABLET, FILM COATED ORAL at 10:20

## 2021-05-13 RX ADMIN — LIDOCAINE 1 PATCH: 50 PATCH CUTANEOUS at 09:29

## 2021-05-21 LAB
CYTO UR: NORMAL
LAB AP CASE REPORT: NORMAL
LAB AP CLINICAL INFORMATION: NORMAL
PATH REPORT.FINAL DX SPEC: NORMAL
PATH REPORT.GROSS SPEC: NORMAL

## 2021-08-31 ENCOUNTER — APPOINTMENT (OUTPATIENT)
Dept: CT IMAGING | Age: 21
End: 2021-08-31
Payer: MEDICAID

## 2021-08-31 ENCOUNTER — HOSPITAL ENCOUNTER (EMERGENCY)
Age: 21
Discharge: HOME | End: 2021-08-31
Attending: EMERGENCY MEDICINE
Payer: MEDICAID

## 2021-08-31 VITALS
TEMPERATURE: 98 F | HEART RATE: 101 BPM | DIASTOLIC BLOOD PRESSURE: 79 MMHG | OXYGEN SATURATION: 98 % | BODY MASS INDEX: 41.59 KG/M2 | WEIGHT: 226 LBS | RESPIRATION RATE: 18 BRPM | SYSTOLIC BLOOD PRESSURE: 118 MMHG | HEIGHT: 62 IN

## 2021-08-31 DIAGNOSIS — K57.32 DIVERTICULITIS OF COLON: Primary | ICD-10-CM

## 2021-08-31 LAB
ALBUMIN SERPL-MCNC: 4.4 G/DL (ref 3.5–5.2)
ALP BLD-CCNC: 90 U/L (ref 35–104)
ALT SERPL-CCNC: 35 U/L (ref 5–33)
ANION GAP SERPL CALCULATED.3IONS-SCNC: 11 MMOL/L (ref 7–19)
AST SERPL-CCNC: 21 U/L (ref 5–32)
BACTERIA: NEGATIVE /HPF
BASOPHILS ABSOLUTE: 0 K/UL (ref 0–0.2)
BASOPHILS RELATIVE PERCENT: 0.2 % (ref 0–1)
BILIRUB SERPL-MCNC: 0.9 MG/DL (ref 0.2–1.2)
BILIRUBIN URINE: NEGATIVE
BLOOD, URINE: NEGATIVE
BUN BLDV-MCNC: 7 MG/DL (ref 6–20)
CALCIUM SERPL-MCNC: 9.2 MG/DL (ref 8.6–10)
CHLORIDE BLD-SCNC: 101 MMOL/L (ref 98–111)
CLARITY: CLEAR
CO2: 26 MMOL/L (ref 22–29)
COLOR: YELLOW
CREAT SERPL-MCNC: 0.7 MG/DL (ref 0.5–0.9)
CRYSTALS, UA: ABNORMAL /HPF
EOSINOPHILS ABSOLUTE: 0.1 K/UL (ref 0–0.6)
EOSINOPHILS RELATIVE PERCENT: 1.5 % (ref 0–5)
EPITHELIAL CELLS, UA: 1 /HPF (ref 0–5)
GFR AFRICAN AMERICAN: >59
GFR NON-AFRICAN AMERICAN: >60
GLUCOSE BLD-MCNC: 99 MG/DL (ref 74–109)
GLUCOSE URINE: NEGATIVE MG/DL
HCG QUALITATIVE: NEGATIVE
HCT VFR BLD CALC: 39.4 % (ref 37–47)
HEMOGLOBIN: 12.1 G/DL (ref 12–16)
HYALINE CASTS: 1 /HPF (ref 0–8)
IMMATURE GRANULOCYTES #: 0 K/UL
KETONES, URINE: NEGATIVE MG/DL
LEUKOCYTE ESTERASE, URINE: ABNORMAL
LIPASE: 20 U/L (ref 13–60)
LYMPHOCYTES ABSOLUTE: 2.4 K/UL (ref 1.1–4.5)
LYMPHOCYTES RELATIVE PERCENT: 25 % (ref 20–40)
MCH RBC QN AUTO: 22.9 PG (ref 27–31)
MCHC RBC AUTO-ENTMCNC: 30.7 G/DL (ref 33–37)
MCV RBC AUTO: 74.5 FL (ref 81–99)
MONOCYTES ABSOLUTE: 0.8 K/UL (ref 0–0.9)
MONOCYTES RELATIVE PERCENT: 8.1 % (ref 0–10)
NEUTROPHILS ABSOLUTE: 6.3 K/UL (ref 1.5–7.5)
NEUTROPHILS RELATIVE PERCENT: 64.9 % (ref 50–65)
NITRITE, URINE: NEGATIVE
PDW BLD-RTO: 14.7 % (ref 11.5–14.5)
PH UA: 7.5 (ref 5–8)
PLATELET # BLD: 323 K/UL (ref 130–400)
PMV BLD AUTO: 9.4 FL (ref 9.4–12.3)
POTASSIUM SERPL-SCNC: 4.3 MMOL/L (ref 3.5–5)
PROTEIN UA: NEGATIVE MG/DL
RBC # BLD: 5.29 M/UL (ref 4.2–5.4)
RBC UA: 1 /HPF (ref 0–4)
SODIUM BLD-SCNC: 138 MMOL/L (ref 136–145)
SPECIFIC GRAVITY UA: 1.01 (ref 1–1.03)
TOTAL PROTEIN: 7.4 G/DL (ref 6.6–8.7)
UROBILINOGEN, URINE: 1 E.U./DL
WBC # BLD: 9.7 K/UL (ref 4.8–10.8)
WBC UA: 4 /HPF (ref 0–5)

## 2021-08-31 PROCEDURE — 81001 URINALYSIS AUTO W/SCOPE: CPT

## 2021-08-31 PROCEDURE — 84703 CHORIONIC GONADOTROPIN ASSAY: CPT

## 2021-08-31 PROCEDURE — 36415 COLL VENOUS BLD VENIPUNCTURE: CPT

## 2021-08-31 PROCEDURE — 85025 COMPLETE CBC W/AUTO DIFF WBC: CPT

## 2021-08-31 PROCEDURE — 83690 ASSAY OF LIPASE: CPT

## 2021-08-31 PROCEDURE — 74150 CT ABDOMEN W/O CONTRAST: CPT

## 2021-08-31 PROCEDURE — 99283 EMERGENCY DEPT VISIT LOW MDM: CPT

## 2021-08-31 PROCEDURE — 80053 COMPREHEN METABOLIC PANEL: CPT

## 2021-08-31 RX ORDER — METRONIDAZOLE 500 MG/1
500 TABLET ORAL 3 TIMES DAILY
Qty: 21 TABLET | Refills: 0 | Status: SHIPPED | OUTPATIENT
Start: 2021-08-31 | End: 2021-09-07

## 2021-08-31 RX ORDER — CIPROFLOXACIN 500 MG/1
500 TABLET, FILM COATED ORAL 2 TIMES DAILY
Qty: 14 TABLET | Refills: 0 | Status: SHIPPED | OUTPATIENT
Start: 2021-08-31 | End: 2021-09-07

## 2021-08-31 ASSESSMENT — ENCOUNTER SYMPTOMS
ABDOMINAL PAIN: 1
ABDOMINAL DISTENTION: 0
SHORTNESS OF BREATH: 0
VOMITING: 0
NAUSEA: 1
DIARRHEA: 0

## 2021-09-01 ENCOUNTER — OFFICE VISIT (OUTPATIENT)
Dept: URGENT CARE | Age: 21
End: 2021-09-01
Payer: MEDICAID

## 2021-09-01 ENCOUNTER — OFFICE VISIT (OUTPATIENT)
Age: 21
End: 2021-09-01

## 2021-09-01 VITALS
HEIGHT: 62 IN | OXYGEN SATURATION: 98 % | BODY MASS INDEX: 41.96 KG/M2 | HEART RATE: 66 BPM | DIASTOLIC BLOOD PRESSURE: 75 MMHG | WEIGHT: 228 LBS | TEMPERATURE: 97.7 F | SYSTOLIC BLOOD PRESSURE: 119 MMHG | RESPIRATION RATE: 16 BRPM

## 2021-09-01 DIAGNOSIS — J06.9 VIRAL URI: Primary | ICD-10-CM

## 2021-09-01 DIAGNOSIS — Z11.59 SCREENING FOR VIRAL DISEASE: Primary | ICD-10-CM

## 2021-09-01 DIAGNOSIS — Z20.822 EXPOSURE TO COVID-19 VIRUS: ICD-10-CM

## 2021-09-01 LAB — SARS-COV-2, PCR: NOT DETECTED

## 2021-09-01 PROCEDURE — 99999 PR OFFICE/OUTPT VISIT,PROCEDURE ONLY: CPT | Performed by: NURSE PRACTITIONER

## 2021-09-01 PROCEDURE — 1036F TOBACCO NON-USER: CPT | Performed by: NURSE PRACTITIONER

## 2021-09-01 PROCEDURE — G8427 DOCREV CUR MEDS BY ELIG CLIN: HCPCS | Performed by: NURSE PRACTITIONER

## 2021-09-01 PROCEDURE — 99203 OFFICE O/P NEW LOW 30 MIN: CPT | Performed by: NURSE PRACTITIONER

## 2021-09-01 PROCEDURE — G8417 CALC BMI ABV UP PARAM F/U: HCPCS | Performed by: NURSE PRACTITIONER

## 2021-09-01 NOTE — ED PROVIDER NOTES
[facility] EMERGENCY DEPT  eMERGENCY dEPARTMENT eNCOUnter      Pt Name: Shlomo King  MRN: [id number]  [place] 2000  Date of evaluation: 8/31/2021  Provider: Troy Parkinson MD    [address] [place] [address]       Chief Complaint   Patient presents with    Flank Pain     left side since yesterday at 1500         HISTORY OF PRESENT ILLNESS   (Location/Symptom, Timing/Onset,Context/Setting, Quality, Duration, Modifying Factors, Severity)  Note limiting factors. Shlomo King is a 24 y.o. female who presents to the emergency department for evaluation regarding moderate severity left-sided abdominal pain. Patient states his pain is been fairly constant and ongoing for about the past 1 day. States there is really not been any radiation of the pain into the groin or low back area. She has had some mild nausea but denies any episodes of vomiting. She is not had episodes of diarrhea or any evidence of bloody stools. No prior history of similar symptoms. Denies hematuria or dysuria symptoms. She does not have any prior history of previous kidney stones. There have not really been any relieving factors for the pain. HPI    NursingNotes were reviewed. REVIEW OF SYSTEMS    (2-9 systems for level 4, 10 or more for level 5)     Review of Systems   Constitutional: Negative for chills and fever. Respiratory: Negative for shortness of breath. Cardiovascular: Negative for chest pain. Gastrointestinal: Positive for abdominal pain and nausea. Negative for abdominal distention, diarrhea and vomiting. Genitourinary: Negative for dysuria, hematuria and vaginal bleeding. Neurological: Negative for syncope. All other systems reviewed and are negative. PAST MEDICALHISTORY     Past Medical History:   Diagnosis Date    Diabetes mellitus ([facility] [address]     gestational, last pregnancy    Mental disorder depression    diagnosed in 2012 with major depression, counseling monthly, off medications.  period of self mutilation(cutting) followed by suicide attempt with plan to overdose on brother's adhd medication         SURGICAL HISTORY       Past Surgical History:   Procedure Laterality Date    CHOLECYSTECTOMY, LAPAROSCOPIC N/A 2/6/2017    CHOLECYSTECTOMY LAPAROSCOPIC, with cholangiogram performed by Governor Dean MD at [facility] Left broke below knee cap    OVARIAN CYST REMOVAL Right 07/15/2016         CURRENT MEDICATIONS     Previous Medications    No medications on file       ALLERGIES     Patient has no known allergies. FAMILY HISTORY       Family History   Problem Relation Age of Onset    Cancer Father     Cancer Paternal Aunt     Diabetes Paternal Grandfather           SOCIAL HISTORY       Social History     Socioeconomic History    Marital status: Single     Spouse name: None    Number of children: None    Years of education: None    Highest education level: None   Occupational History    None   Tobacco Use    Smoking status: Never Smoker    Smokeless tobacco: Never Used   Vaping Use    Vaping Use: Never used   Substance and Sexual Activity    Alcohol use: No    Drug use: No    Sexual activity: Yes     Partners: Male   Other Topics Concern    None   Social History Narrative    None     Social Determinants of Health     Financial Resource Strain:     Difficulty of Paying Living Expenses:    Food Insecurity:     Worried About Running Out of Food in the Last Year:     Ran Out of Food in the Last Year:    Transportation Needs:     Lack of Transportation (Medical):      Lack of Transportation (Non-Medical):    Physical Activity:     Days of Exercise per Week:     Minutes of Exercise per Session:    Stress:     Feeling of Stress :    Social Connections:     Frequency of Communication with Friends and Family:     Frequency of Social Gatherings with Friends and Family:     Attends [employer] Services:     Active Member of Clubs or Organizations:     Attends Club or Organization [facility]  [phone]    If symptoms worsen      DISCHARGE MEDICATIONS:  New Prescriptions    CIPROFLOXACIN (CIPRO) 500 MG TABLET    Take 1 tablet by mouth 2 times daily for 7 days    METRONIDAZOLE (FLAGYL) 500 MG TABLET    Take 1 tablet by mouth 3 times daily for 7 days          (Please note that portions of this note were completed with a voice recognition program.  Efforts were made to edit thedictations but occasionally words are mis-transcribed.)    Sharad Herring MD (electronically signed)  Attending Emergency Physician         Sharad Herring MD  08/31/21 [id number]

## 2021-09-01 NOTE — PROGRESS NOTES
[address] [facility] URGENT CARE  [address] [address] [place] [address]  Dept: [phone]  Dept Fax: [phone]  Loc: [phone]    Latasha Parker is a 24 y.o. female who presents today for her medical conditions/complaintsas noted below. Latasha Parker is c/o of Nasal Congestion and Concern For COVID-19 (+ exposure on sunday )        HPI:     HPI  Mary Jo Van presents today with nasal congestion and URI symptoms. Has had multiple recent covid-19 exposures. No fever. Has not taken anything for her symptoms. Was in ER last night for diverticulitis flare. She is on flagyl and cipro. Past Medical History:   Diagnosis Date    Diabetes mellitus ([address]     gestational, last pregnancy    Mental disorder depression    diagnosed in 2012 with major depression, counseling monthly, off medications. period of self mutilation(cutting) followed by suicide attempt with plan to overdose on brother's adhd medication     Past Surgical History:   Procedure Laterality Date    CHOLECYSTECTOMY, LAPAROSCOPIC N/A 2/6/2017    CHOLECYSTECTOMY LAPAROSCOPIC, with cholangiogram performed by Jena Quinn MD at [facility] Left broke below knee cap    OVARIAN CYST REMOVAL Right 07/15/2016       Family History   Problem Relation Age of Onset    Cancer Father     Cancer Paternal Aunt     Diabetes Paternal Grandfather        Social History     Tobacco Use    Smoking status: Never Smoker    Smokeless tobacco: Never Used   Substance Use Topics    Alcohol use: No      Current Outpatient Medications   Medication Sig Dispense Refill    ciprofloxacin (CIPRO) 500 MG tablet Take 1 tablet by mouth 2 times daily for 7 days 14 tablet 0    metroNIDAZOLE (FLAGYL) 500 MG tablet Take 1 tablet by mouth 3 times daily for 7 days 21 tablet 0     No current facility-administered medications for this visit.      No Known Allergies    Health Maintenance   Topic Date Due    Hepatitis C screen  Never done    Varicella vaccine (1 of 2 - 2-dose childhood series) Never done    HPV vaccine (1 - 2-dose series) Never done    COVID-19 Vaccine (1) Never done    Chlamydia screen  Never done    DTaP/Tdap/Td vaccine (1 - Tdap) Never done    Flu vaccine (1) 09/01/2021    Cervical cancer screen  10/26/2023    HIV screen  Completed    Hepatitis A vaccine  Aged Out    Hepatitis B vaccine  Aged Out    Hib vaccine  Aged Out    Meningococcal (ACWY) vaccine  Aged Out    Pneumococcal 0-64 years Vaccine  Aged Out       Subjective:     Review of Systems   Constitutional: Negative for chills and fever. HENT: Positive for congestion. All other systems reviewed and are negative.      :Objective      Physical Exam  Vitals and nursing note reviewed. Constitutional:       General: She is not in acute distress. Appearance: Normal appearance. She is well-developed. She is ill-appearing. She is not diaphoretic. HENT:      Head: Normocephalic and atraumatic. Right Ear: Tympanic membrane, ear canal and external ear normal.      Left Ear: Tympanic membrane, ear canal and external ear normal.      Nose: Nose normal.      Mouth/Throat:      Mouth: Mucous membranes are moist.      Pharynx: Oropharynx is clear. No posterior oropharyngeal erythema. Eyes:      Conjunctiva/sclera: Conjunctivae normal.      Pupils: Pupils are equal, round, and reactive to light. Cardiovascular:      Rate and Rhythm: Normal rate and regular rhythm. Heart sounds: Normal heart sounds. No murmur heard. Pulmonary:      Effort: Pulmonary effort is normal. No respiratory distress. Breath sounds: Normal breath sounds. No wheezing, rhonchi or rales. Abdominal:      Palpations: Abdomen is soft. Musculoskeletal:      Cervical back: Normal range of motion. Skin:     General: Skin is warm and dry. Findings: No rash. Neurological:      Mental Status: She is alert and oriented to person, place, and time.    Psychiatric:         Mood and Affect: Mood normal.         Behavior: Behavior normal.       /75   Pulse 66   Temp 97.7 °F (36.5 °C) (Temporal)   Resp 16   Ht 5' 2\" (1.575 m)   Wt 228 lb (103.4 kg)   SpO2 98%   BMI 41.70 kg/m²     :Assessment       Diagnosis Orders   1. Exposure to COVID-19 virus     2. Viral URI         :Plan   1. Rest and increase fluid intake. 2. Covid-19 testing. Quarantine at home until results are called to you. If positive you will have to quarantine for 14days. If positive the health dept will also contact you. 3. Monitor for fever and treat as needed with tylenol or ibuprofen  4. If patient is not improving or developing any new/worsening symptoms then return to clinic as needed or go to ER. Patient is to follow up with PCP as needed. No orders of the defined types were placed in this encounter. No follow-ups on file. No orders of the defined types were placed in this encounter. Patient given educational materials- see patient instructions. Discussed use, benefit, and side effects of prescribedmedications. All patient questions answered. Pt voiced understanding. Patient Instructions       Patient Education        Learning About Coronavirus ([id number])  What is coronavirus ([id number])? COVID-19 is a disease caused by a new type of coronavirus. This illness was first found in December 2019. It has since spread worldwide. Coronaviruses are a large group of viruses. They cause the common cold. They also cause more serious illnesses like Middle East respiratory syndrome (MERS) and severe acute respiratory syndrome (SARS). COVID-19 is caused by a novel coronavirus. That means it's a new type that has not been seen in people before. What are the symptoms? Coronavirus (COVID-19) symptoms may include:  · Fever. · Cough. · Trouble breathing. · Chills or repeated shaking with chills. · Muscle pain. · Headache. · Sore throat.   · New loss of taste or smell.  · Vomiting. · Diarrhea. In severe cases, COVID-19 can cause pneumonia and make it hard to breathe without help from a machine. It can cause death. How is it diagnosed? COVID-19 is diagnosed with a viral test. This may also be called a PCR test or antigen test. It looks for evidence of the virus in your breathing passages or lungs (respiratory system). The test is most often done on a sample from the nose, throat, or lungs. It's sometimes done on a sample of saliva. One way a sample is collected is by putting a long swab into the back of your nose. How is it treated? Mild cases of COVID-19 can be treated at home. Serious cases need treatment in the hospital. Treatment may include medicines to reduce symptoms, plus breathing support such as oxygen therapy or a ventilator. Some people may be placed on their belly to help their oxygen levels. Treatments that may help people who have COVID-19 include:  Antiviral medicines. These medicines treat viral infections. Remdesivir is an example. Immune-based therapy. These medicines help the immune system fight COVID-19. One example is bamlanivimab. It's a monoclonal antibody. Blood thinners. These medicines help prevent blood clots. People with severe illness are at risk for blood clots. How can you protect yourself and others? The best way to protect yourself from getting sick is to:  · Avoid areas where there is an outbreak. · Avoid contact with people who may be infected. · Avoid crowds and try to stay at least 6 feet away from other people. · Wash your hands often, especially after you cough or sneeze. Use soap and water, and scrub for at least 20 seconds. If soap and water aren't available, use an alcohol-based hand . · Avoid touching your mouth, nose, and eyes. To help avoid spreading the virus to others:  · Stay home if you are sick or have been exposed to the virus. Don't go to school, work, or public areas.  And don't use public transportation, ride-shares, or taxis unless you have no choice. · Wear a cloth face cover if you have to go to public areas. · Cover your mouth with a tissue when you cough or sneeze. Then throw the tissue in the trash and wash your hands right away. · If you're sick:  ? Leave your home only if you need to get medical care. But call the doctor's office first so they know you're coming. And wear a face cover. ? Wear the face cover whenever you're around other people. It can help stop the spread of the virus. ? Limit contact with pets and people in your home. If possible, stay in a separate bedroom and use a separate bathroom. ? Clean and disinfect your home every day. Use household  and disinfectant wipes or sprays. Take special care to clean things that you grab with your hands. These include doorknobs, remote controls, phones, and handles on your refrigerator and microwave. And don't forget countertops, tabletops, bathrooms, and computer keyboards. When should you call for help? Call 911 anytime you think you may need emergency care. For example, call if you have life-threatening symptoms, such as:    · You have severe trouble breathing. (You can't talk at all.)     · You have constant chest pain or pressure.     · You are severely dizzy or lightheaded.     · You are confused or can't think clearly.     · Your face and lips have a blue color.     · You pass out (lose consciousness) or are very hard to wake up. Call your doctor now or seek immediate medical care if:    · You have moderate trouble breathing. (You can't speak a full sentence.)     · You are coughing up blood (more than about 1 teaspoon).     · You have signs of low blood pressure. These include feeling lightheaded; being too weak to stand; and having cold, pale, clammy skin. Watch closely for changes in your health, and be sure to contact your doctor if:    · Your symptoms get worse.     · You are not getting better as expected. Call before you go to the doctor's office. Follow their instructions. And wear a cloth face cover. Current as of: March 26, 2021               Content Version: 12.9  © 2006-2021 Health[employer]. Care instructions adapted under license by [facility] ([facility]). If you have questions about a medical condition or this instruction, always ask your healthcare professional. [place] [address] any warranty or liability for your use of this information. 1. Rest and increase fluid intake. 2. Covid-19 testing. Quarantine at home until results are called to you. If positive you will have to quarantine for 14days. If positive the health dept will also contact you. 3. Monitor for fever and treat as needed with tylenol or ibuprofen  4. If patient is not improving or developing any new/worsening symptoms then return to clinic as needed or go to ER. Patient is to follow up with PCP as needed.                Electronically signed by VELMA Schafer on 9/1/2021 at 1:54 PM

## 2021-09-01 NOTE — PATIENT INSTRUCTIONS
These medicines help prevent blood clots. People with severe illness are at risk for blood clots. How can you protect yourself and others? The best way to protect yourself from getting sick is to:  · Avoid areas where there is an outbreak. · Avoid contact with people who may be infected. · Avoid crowds and try to stay at least 6 feet away from other people. · Wash your hands often, especially after you cough or sneeze. Use soap and water, and scrub for at least 20 seconds. If soap and water aren't available, use an alcohol-based hand . · Avoid touching your mouth, nose, and eyes. To help avoid spreading the virus to others:  · Stay home if you are sick or have been exposed to the virus. Don't go to school, work, or public areas. And don't use public transportation, ride-shares, or taxis unless you have no choice. · Wear a cloth face cover if you have to go to public areas. · Cover your mouth with a tissue when you cough or sneeze. Then throw the tissue in the trash and wash your hands right away. · If you're sick:  ? Leave your home only if you need to get medical care. But call the doctor's office first so they know you're coming. And wear a face cover. ? Wear the face cover whenever you're around other people. It can help stop the spread of the virus. ? Limit contact with pets and people in your home. If possible, stay in a separate bedroom and use a separate bathroom. ? Clean and disinfect your home every day. Use household  and disinfectant wipes or sprays. Take special care to clean things that you grab with your hands. These include doorknobs, remote controls, phones, and handles on your refrigerator and microwave. And don't forget countertops, tabletops, bathrooms, and computer keyboards. When should you call for help? Call 911 anytime you think you may need emergency care.  For example, call if you have life-threatening symptoms, such as:    · You have severe trouble breathing. (You can't talk at all.)     · You have constant chest pain or pressure.     · You are severely dizzy or lightheaded.     · You are confused or can't think clearly.     · Your face and lips have a blue color.     · You pass out (lose consciousness) or are very hard to wake up. Call your doctor now or seek immediate medical care if:    · You have moderate trouble breathing. (You can't speak a full sentence.)     · You are coughing up blood (more than about 1 teaspoon).     · You have signs of low blood pressure. These include feeling lightheaded; being too weak to stand; and having cold, pale, clammy skin. Watch closely for changes in your health, and be sure to contact your doctor if:    · Your symptoms get worse.     · You are not getting better as expected. Call before you go to the doctor's office. Follow their instructions. And wear a cloth face cover. Current as of: March 26, 2021               Content Version: 12.9  © 2006-2021 Healthwise, Inc[employer]. Care instructions adapted under license by [facility] ([facility]). If you have questions about a medical condition or this instruction, always ask your healthcare professional. [place] [address] any warranty or liability for your use of this information. 1. Rest and increase fluid intake. 2. Covid-19 testing. Quarantine at home until results are called to you. If positive you will have to quarantine for 14days. If positive the health dept will also contact you. 3. Monitor for fever and treat as needed with tylenol or ibuprofen  4. If patient is not improving or developing any new/worsening symptoms then return to clinic as needed or go to ER. Patient is to follow up with PCP as needed.

## 2021-09-01 NOTE — PROGRESS NOTES
Patient was evaluated in the clinic, swab was collected to screen for COVID-19 using [facility] Max testing.

## 2022-01-14 ENCOUNTER — APPOINTMENT (OUTPATIENT)
Dept: GENERAL RADIOLOGY | Age: 22
End: 2022-01-14
Payer: MEDICAID

## 2022-01-14 ENCOUNTER — HOSPITAL ENCOUNTER (EMERGENCY)
Age: 22
Discharge: HOME | End: 2022-01-14
Payer: MEDICAID

## 2022-01-14 ENCOUNTER — APPOINTMENT (OUTPATIENT)
Dept: CT IMAGING | Age: 22
End: 2022-01-14
Payer: MEDICAID

## 2022-01-14 VITALS
SYSTOLIC BLOOD PRESSURE: 124 MMHG | TEMPERATURE: 97.8 F | BODY MASS INDEX: 44.16 KG/M2 | DIASTOLIC BLOOD PRESSURE: 77 MMHG | RESPIRATION RATE: 16 BRPM | WEIGHT: 240 LBS | HEART RATE: 95 BPM | HEIGHT: 62 IN | OXYGEN SATURATION: 98 %

## 2022-01-14 DIAGNOSIS — M25.552 BILATERAL HIP PAIN: Primary | ICD-10-CM

## 2022-01-14 DIAGNOSIS — M25.551 BILATERAL HIP PAIN: Primary | ICD-10-CM

## 2022-01-14 LAB
BACTERIA: ABNORMAL /HPF
BILIRUBIN URINE: NEGATIVE
BLOOD, URINE: NEGATIVE
CLARITY: CLEAR
COLOR: YELLOW
EPITHELIAL CELLS, UA: 4 /HPF (ref 0–5)
GLUCOSE URINE: NEGATIVE MG/DL
HCG(URINE) PREGNANCY TEST: NEGATIVE
HYALINE CASTS: 0 /HPF (ref 0–8)
KETONES, URINE: NEGATIVE MG/DL
LEUKOCYTE ESTERASE, URINE: ABNORMAL
NITRITE, URINE: NEGATIVE
PH UA: 6 (ref 5–8)
PROTEIN UA: NEGATIVE MG/DL
RBC UA: 1 /HPF (ref 0–4)
SPECIFIC GRAVITY UA: 1.01 (ref 1–1.03)
UROBILINOGEN, URINE: 0.2 E.U./DL
WBC UA: 4 /HPF (ref 0–5)

## 2022-01-14 PROCEDURE — 72170 X-RAY EXAM OF PELVIS: CPT

## 2022-01-14 PROCEDURE — 6360000002 HC RX W HCPCS: Performed by: PHYSICIAN ASSISTANT

## 2022-01-14 PROCEDURE — 99283 EMERGENCY DEPT VISIT LOW MDM: CPT

## 2022-01-14 PROCEDURE — 72131 CT LUMBAR SPINE W/O DYE: CPT

## 2022-01-14 PROCEDURE — 96372 THER/PROPH/DIAG INJ SC/IM: CPT

## 2022-01-14 PROCEDURE — 81001 URINALYSIS AUTO W/SCOPE: CPT

## 2022-01-14 PROCEDURE — 84703 CHORIONIC GONADOTROPIN ASSAY: CPT

## 2022-01-14 RX ORDER — CYCLOBENZAPRINE HCL 5 MG
5 TABLET ORAL NIGHTLY PRN
Qty: 30 TABLET | Refills: 0 | Status: SHIPPED | OUTPATIENT
Start: 2022-01-14 | End: 2022-01-24

## 2022-01-14 RX ORDER — PREDNISONE 10 MG/1
10 TABLET ORAL DAILY
Qty: 10 TABLET | Refills: 0 | Status: SHIPPED | OUTPATIENT
Start: 2022-01-14 | End: 2022-01-24

## 2022-01-14 RX ORDER — ORPHENADRINE CITRATE 30 MG/ML
60 INJECTION INTRAMUSCULAR; INTRAVENOUS ONCE
Status: COMPLETED | OUTPATIENT
Start: 2022-01-14 | End: 2022-01-14

## 2022-01-14 RX ADMIN — ORPHENADRINE CITRATE 60 MG: 30 INJECTION INTRAMUSCULAR; INTRAVENOUS at 11:17

## 2022-01-14 ASSESSMENT — ENCOUNTER SYMPTOMS
ABDOMINAL PAIN: 0
EYE PAIN: 0
EYE DISCHARGE: 0
COLOR CHANGE: 0
COUGH: 0
NAUSEA: 0
SHORTNESS OF BREATH: 0
SORE THROAT: 0
APNEA: 0
ABDOMINAL DISTENTION: 0
PHOTOPHOBIA: 0
RHINORRHEA: 0
BACK PAIN: 0

## 2022-01-14 ASSESSMENT — PAIN DESCRIPTION - FREQUENCY: FREQUENCY: CONTINUOUS

## 2022-01-14 ASSESSMENT — PAIN SCALES - GENERAL: PAINLEVEL_OUTOF10: 9

## 2022-01-14 ASSESSMENT — PAIN DESCRIPTION - DESCRIPTORS: DESCRIPTORS: STABBING

## 2022-01-14 ASSESSMENT — PAIN DESCRIPTION - ORIENTATION: ORIENTATION: RIGHT;LEFT

## 2022-01-14 ASSESSMENT — PAIN DESCRIPTION - LOCATION: LOCATION: HIP

## 2022-01-14 ASSESSMENT — PAIN DESCRIPTION - PAIN TYPE: TYPE: ACUTE PAIN

## 2022-01-14 NOTE — Clinical Note
Juan Evangelista was seen and treated in our emergency department on 1/14/2022. She may return to work on 01/18/2022. If you have any questions or concerns, please don't hesitate to call.       SANG [place]

## 2022-01-14 NOTE — ED PROVIDER NOTES
[address] [facility] EMERGENCY DEPT  eMERGENCYdEPARTMENT eNCOUnter      Pt Name: Cori Barbour  MRN: [id number]  [place] 2000  Date of evaluation: 1/14/2022  Provider:SANG Motley    CHIEF COMPLAINT       Chief Complaint   Patient presents with    Hip Pain     pt woke up at 0430 with bilateral hip pain. pt reports very tender to touch. is able to walk and bear weight. HISTORY OF PRESENT ILLNESS  (Location/Symptom, Timing/Onset, Context/Setting, Quality, Duration, Modifying Factors, Severity.)   Cori Barbour is a 24 y.o. female who presents to the emergency department with complaints of bilateral hip pain starting acutely this morning around 430 it woke her up. She states this is reproducible with movement tender to touch. It is about the groin and lateral aspect worse on right than left she is able to walk and bear weight no incontinence no lower back pain. She is morbidly obese denies any recent injury that she can think of. She denies any sensory deficits of distal extremities lower aspect. No nausea vomiting or fever. No interventions prior to ED arrival.    HPI    Nursing Notes were reviewed and I agree. REVIEW OF SYSTEMS    (2-9 systems for level 4, 10 or more for level 5)     Review of Systems   Constitutional: Negative for activity change, appetite change, chills and fever. HENT: Negative for congestion, postnasal drip, rhinorrhea and sore throat. Eyes: Negative for photophobia, pain, discharge and visual disturbance. Respiratory: Negative for apnea, cough and shortness of breath. Cardiovascular: Negative for chest pain and leg swelling. Gastrointestinal: Negative for abdominal distention, abdominal pain and nausea. Genitourinary: Negative for vaginal bleeding. Musculoskeletal: Positive for arthralgias and myalgias. Negative for back pain, joint swelling, neck pain and neck stiffness. Skin: Negative for color change and rash.    Neurological: Negative for dizziness, syncope, facial asymmetry and headaches. Hematological: Negative for adenopathy. Does not bruise/bleed easily. Psychiatric/Behavioral: Negative for agitation, behavioral problems and confusion. Except as noted above the remainder of the review of systems was reviewed and negative. PAST MEDICAL HISTORY     Past Medical History:   Diagnosis Date    Diabetes mellitus ([address]     gestational, last pregnancy    Mental disorder depression    diagnosed in 2012 with major depression, counseling monthly, off medications. period of self mutilation(cutting) followed by suicide attempt with plan to overdose on brother's adhd medication         SURGICAL HISTORY       Past Surgical History:   Procedure Laterality Date    CHOLECYSTECTOMY, LAPAROSCOPIC N/A 2/6/2017    CHOLECYSTECTOMY LAPAROSCOPIC, with cholangiogram performed by Sheila Solomon MD at [place] [address] Left broke below knee cap    OVARIAN CYST REMOVAL Right 07/15/2016         CURRENT MEDICATIONS       Discharge Medication List as of 1/14/2022  1:10 PM          ALLERGIES     Patient has no known allergies.     FAMILY HISTORY       Family History   Problem Relation Age of Onset    Cancer Father     Cancer Paternal Aunt     Diabetes Paternal Grandfather           SOCIAL HISTORY       Social History     Socioeconomic History    Marital status:      Spouse name: Not on file    Number of children: Not on file    Years of education: Not on file    Highest education level: Not on file   Occupational History    Not on file   Tobacco Use    Smoking status: Never Smoker    Smokeless tobacco: Never Used   Vaping Use    Vaping Use: Never used   Substance and Sexual Activity    Alcohol use: No    Drug use: No    Sexual activity: Yes     Partners: Male   Other Topics Concern    Not on file   Social History Narrative    Not on file     Social Determinants of Health     Financial Resource Strain:     Difficulty of Paying Living Expenses: Not on file   Food Insecurity:     Worried About [address] [employer] in the Last Year: Not on file    Sin[place] of Food in the Last Year: Not on file   Transportation Needs:     Lack of Transportation (Medical): Not on file    Lack of Transportation (Non-Medical): Not on file   Physical Activity:     Days of Exercise per Week: Not on file    Minutes of Exercise per Session: Not on file   Stress:     Feeling of Stress : Not on file   Social Connections:     Frequency of Communication with Friends and Family: Not on file    Frequency of Social Gatherings with Friends and Family: Not on file    Attends Jehovah's witness Services: Not on file    Active Member of [address] [place] [address] or Organizations: Not on file    Attends Club or Organization Meetings: Not on file    Marital Status: Not on file   Intimate Partner Violence:     Fear of Current or Ex-Partner: Not on file    Emotionally Abused: Not on file    Physically Abused: Not on file    Sexually Abused: Not on file   Housing Stability:     Unable to Pay for Housing in the Last Year: Not on file    Number of [place] in the Last Year: Not on file    Unstable Housing in the Last Year: Not on file       SCREENINGS           PHYSICAL EXAM    (up to 7 forlevel 4, 8 or more for level 5)     ED Triage Vitals [01/14/22 1043]   BP Temp Temp Source Pulse Resp SpO2 Height Weight   112/80 97.8 °F (36.6 °C) Temporal 103 16 96 % 5' 2\" (1.575 m) 240 lb (108.9 kg)       Physical Exam  Vitals and nursing note reviewed. Constitutional:       General: She is not in acute distress. Appearance: She is well-developed. She is obese. She is not diaphoretic. HENT:      Head: Normocephalic and atraumatic. Right Ear: Tympanic membrane, ear canal and external ear normal.      Left Ear: Tympanic membrane, ear canal and external ear normal.      Mouth/Throat:      Mouth: Mucous membranes are moist.      Pharynx: No oropharyngeal exudate.    Eyes:      General:         Right eye: No discharge. Left eye: No discharge. Pupils: Pupils are equal, round, and reactive to light. Neck:      Thyroid: No thyromegaly. Cardiovascular:      Rate and Rhythm: Normal rate and regular rhythm. Pulses: Normal pulses. Heart sounds: Normal heart sounds. No murmur heard. No friction rub. Pulmonary:      Effort: Pulmonary effort is normal. No respiratory distress. Breath sounds: Normal breath sounds. No stridor. No wheezing. Abdominal:      General: Abdomen is flat. Bowel sounds are normal. There is no distension. Palpations: Abdomen is soft. Tenderness: There is no abdominal tenderness. Musculoskeletal:         General: Tenderness present. Normal range of motion. Cervical back: Normal range of motion and neck supple. Comments: Lateral groin aspect nothing in abdomen this is reproduced. The pain made worse with flexion of hip and lateral rotation. Skin:     General: Skin is warm and dry. Capillary Refill: Capillary refill takes less than 2 seconds. Findings: No rash. Neurological:      Mental Status: She is alert and oriented to person, place, and time. Cranial Nerves: No cranial nerve deficit. Sensory: No sensory deficit. Coordination: Coordination normal.   Psychiatric:         Mood and Affect: Mood normal.         Behavior: Behavior normal.         Thought Content: Thought content normal.         Judgment: Judgment normal.           DIAGNOSTIC RESULTS     RADIOLOGY:   Non-plain film images such as CT, Ultrasound and MRI are read by the radiologist. Plain radiographic images are visualized and preliminarilyinterpreted by No att. providers found with the below findings:      Interpretation per the Radiologist below, if available at the time of this note:    XR PELVIS (1-2 VIEWS)   Final Result   No fracture, no acute osseous abnormality. Signed by Dr Ryley Escamilla      CT LUMBAR SPINE WO CONTRAST   Final Result   1.  No evidence of acute injury in the osseous lumbar spine. 2. No significant degenerative change appreciated. Signed by Dr Shaun Guerrero:  Labs Reviewed   URINE RT REFLEX TO CULTURE - Abnormal; Notable for the following components:       Result Value    Leukocyte Esterase, Urine TRACE (*)     All other components within normal limits   MICROSCOPIC URINALYSIS - Abnormal; Notable for the following components:    Bacteria, UA None Seen (*)     All other components within normal limits   PREGNANCY, URINE       All other labs were within normal range or notreturned as of this dictation. RE-ASSESSMENT        EMERGENCY DEPARTMENT COURSE and DIFFERENTIAL DIAGNOSIS/MDM:   Vitals:    Vitals:    01/14/22 1043 01/14/22 1310   BP: 112/80 124/77   Pulse: 103 95   Resp: 16 16   Temp: 97.8 °F (36.6 °C)    TempSrc: Temporal    SpO2: 96% 98%   Weight: 240 lb (108.9 kg)    Height: 5' 2\" (1.575 m)          MDM  Patient has a normal neurologic exam equal strength bilaterally this pain is about the groin and lateral aspect of the hip almost consistent with a psoas or musculoskeletal issue nothing was seen on the x-ray of her pelvis including hip sockets and nothing seen on her CT lumbar spine to confirm that this is some sort of radiculopathy issue. She is not tender about her bursa bilaterally no buttock pain she is ambulatory here pain is easily treated and she has no incontinence I do not feel emergent MRI indicated for this individual she has no sensory deficit. Plan will be for supportive care close follow-up with her PCP as needed. She request work excuse prior to discharge. PROCEDURES:    Procedures      FINAL IMPRESSION      1. Bilateral hip pain          DISPOSITION/PLAN   DISPOSITION Decision To Discharge 01/14/2022 01:10:00 PM      PATIENT REFERRED TO:  [facility] EMERGENCY DEPT  [facility]  [phone]    If symptoms worsen    [place].   [place]  [phone]  Schedule an appointment as soon as possible for a visit in 3 days  As needed      DISCHARGE MEDICATIONS:  Discharge Medication List as of 1/14/2022  1:10 PM      START taking these medications    Details   predniSONE (DELTASONE) 10 MG tablet Take 1 tablet by mouth daily for 10 days, Disp-10 tablet, R-0Normal      cyclobenzaprine (FLEXERIL) 5 MG tablet Take 1 tablet by mouth nightly as needed for Muscle spasms, Disp-30 tablet, R-0Normal             (Please note that portions of this note were completed with a voice recognition program.  Efforts were made to edit the dictations but occasionallywords are mis-transcribed.)    Matteo Spicer [address], [place]  01/15/22 1029

## 2022-03-11 ENCOUNTER — TELEPHONE (OUTPATIENT)
Dept: OBGYN CLINIC | Age: 22
End: 2022-03-11

## 2022-03-11 NOTE — TELEPHONE ENCOUNTER
Pt called to schedule appt for Pregnancy confirmation. Pt would like to schedule with Sisi Bazan. Please contact pt when able.     Thank you

## 2022-03-16 ENCOUNTER — APPOINTMENT (OUTPATIENT)
Dept: ULTRASOUND IMAGING | Facility: HOSPITAL | Age: 22
End: 2022-03-16

## 2022-03-16 ENCOUNTER — HOSPITAL ENCOUNTER (EMERGENCY)
Facility: HOSPITAL | Age: 22
Discharge: HOME | End: 2022-03-16
Admitting: EMERGENCY MEDICINE

## 2022-03-16 VITALS
DIASTOLIC BLOOD PRESSURE: 61 MMHG | BODY MASS INDEX: 42.52 KG/M2 | HEART RATE: 100 BPM | WEIGHT: 240 LBS | RESPIRATION RATE: 18 BRPM | HEIGHT: 63 IN | SYSTOLIC BLOOD PRESSURE: 146 MMHG | TEMPERATURE: 99.5 F | OXYGEN SATURATION: 100 %

## 2022-03-16 DIAGNOSIS — O26.891 ABDOMINAL PAIN DURING PREGNANCY IN FIRST TRIMESTER: Primary | ICD-10-CM

## 2022-03-16 DIAGNOSIS — R10.9 ABDOMINAL PAIN DURING PREGNANCY IN FIRST TRIMESTER: Primary | ICD-10-CM

## 2022-03-16 DIAGNOSIS — N83.209 CYST OF OVARY, UNSPECIFIED LATERALITY: ICD-10-CM

## 2022-03-16 LAB
ABO GROUP BLD: NORMAL
ALBUMIN SERPL-MCNC: 4.5 G/DL (ref 3.5–5.2)
ALBUMIN/GLOB SERPL: 1.7 G/DL
ALP SERPL-CCNC: 66 U/L (ref 39–117)
ALT SERPL W P-5'-P-CCNC: 25 U/L (ref 1–33)
ANION GAP SERPL CALCULATED.3IONS-SCNC: 10 MMOL/L (ref 5–15)
AST SERPL-CCNC: 21 U/L (ref 1–32)
BACTERIA UR QL AUTO: ABNORMAL /HPF
BASOPHILS # BLD AUTO: 0.01 10*3/MM3 (ref 0–0.2)
BASOPHILS NFR BLD AUTO: 0.2 % (ref 0–1.5)
BILIRUB SERPL-MCNC: 0.9 MG/DL (ref 0–1.2)
BILIRUB UR QL STRIP: NEGATIVE
BLD GP AB SCN SERPL QL: NEGATIVE
BUN SERPL-MCNC: 4 MG/DL (ref 6–20)
BUN/CREAT SERPL: 7 (ref 7–25)
CALCIUM SPEC-SCNC: 9.1 MG/DL (ref 8.6–10.5)
CHLORIDE SERPL-SCNC: 106 MMOL/L (ref 98–107)
CLARITY UR: ABNORMAL
CO2 SERPL-SCNC: 24 MMOL/L (ref 22–29)
COLOR UR: YELLOW
CREAT SERPL-MCNC: 0.57 MG/DL (ref 0.57–1)
DEPRECATED RDW RBC AUTO: 39.4 FL (ref 37–54)
EGFRCR SERPLBLD CKD-EPI 2021: 132 ML/MIN/1.73
EOSINOPHIL # BLD AUTO: 0.06 10*3/MM3 (ref 0–0.4)
EOSINOPHIL NFR BLD AUTO: 1 % (ref 0.3–6.2)
ERYTHROCYTE [DISTWIDTH] IN BLOOD BY AUTOMATED COUNT: 13.8 % (ref 12.3–15.4)
GLOBULIN UR ELPH-MCNC: 2.7 GM/DL
GLUCOSE SERPL-MCNC: 97 MG/DL (ref 65–99)
GLUCOSE UR STRIP-MCNC: NEGATIVE MG/DL
HCG INTACT+B SERPL-ACNC: 1670 MIU/ML
HCT VFR BLD AUTO: 39.8 % (ref 34–46.6)
HGB BLD-MCNC: 12.7 G/DL (ref 12–15.9)
HGB UR QL STRIP.AUTO: NEGATIVE
HYALINE CASTS UR QL AUTO: ABNORMAL /LPF
IMM GRANULOCYTES # BLD AUTO: 0.02 10*3/MM3 (ref 0–0.05)
IMM GRANULOCYTES NFR BLD AUTO: 0.3 % (ref 0–0.5)
KETONES UR QL STRIP: NEGATIVE
LEUKOCYTE ESTERASE UR QL STRIP.AUTO: ABNORMAL
LYMPHOCYTES # BLD AUTO: 1.72 10*3/MM3 (ref 0.7–3.1)
LYMPHOCYTES NFR BLD AUTO: 28.2 % (ref 19.6–45.3)
MCH RBC QN AUTO: 25.2 PG (ref 26.6–33)
MCHC RBC AUTO-ENTMCNC: 31.9 G/DL (ref 31.5–35.7)
MCV RBC AUTO: 79 FL (ref 79–97)
MONOCYTES # BLD AUTO: 0.54 10*3/MM3 (ref 0.1–0.9)
MONOCYTES NFR BLD AUTO: 8.9 % (ref 5–12)
NEUTROPHILS NFR BLD AUTO: 3.74 10*3/MM3 (ref 1.7–7)
NEUTROPHILS NFR BLD AUTO: 61.4 % (ref 42.7–76)
NITRITE UR QL STRIP: NEGATIVE
NRBC BLD AUTO-RTO: 0 /100 WBC (ref 0–0.2)
NUMBER OF DOSES: NORMAL
PH UR STRIP.AUTO: 6 [PH] (ref 5–8)
PLATELET # BLD AUTO: 336 10*3/MM3 (ref 140–450)
PMV BLD AUTO: 9.2 FL (ref 6–12)
POTASSIUM SERPL-SCNC: 3.7 MMOL/L (ref 3.5–5.2)
PROT SERPL-MCNC: 7.2 G/DL (ref 6–8.5)
PROT UR QL STRIP: NEGATIVE
RBC # BLD AUTO: 5.04 10*6/MM3 (ref 3.77–5.28)
RBC # UR STRIP: ABNORMAL /HPF
REF LAB TEST METHOD: ABNORMAL
RH BLD: POSITIVE
SODIUM SERPL-SCNC: 140 MMOL/L (ref 136–145)
SP GR UR STRIP: 1.01 (ref 1–1.03)
SQUAMOUS #/AREA URNS HPF: ABNORMAL /HPF
UROBILINOGEN UR QL STRIP: ABNORMAL
WBC # UR STRIP: ABNORMAL /HPF
WBC NRBC COR # BLD: 6.09 10*3/MM3 (ref 3.4–10.8)

## 2022-03-16 PROCEDURE — 86900 BLOOD TYPING SEROLOGIC ABO: CPT | Performed by: NURSE PRACTITIONER

## 2022-03-16 PROCEDURE — 81001 URINALYSIS AUTO W/SCOPE: CPT | Performed by: NURSE PRACTITIONER

## 2022-03-16 PROCEDURE — 99283 EMERGENCY DEPT VISIT LOW MDM: CPT

## 2022-03-16 PROCEDURE — 80053 COMPREHEN METABOLIC PANEL: CPT | Performed by: NURSE PRACTITIONER

## 2022-03-16 PROCEDURE — 86850 RBC ANTIBODY SCREEN: CPT | Performed by: NURSE PRACTITIONER

## 2022-03-16 PROCEDURE — 84702 CHORIONIC GONADOTROPIN TEST: CPT | Performed by: NURSE PRACTITIONER

## 2022-03-16 PROCEDURE — 86901 BLOOD TYPING SEROLOGIC RH(D): CPT | Performed by: NURSE PRACTITIONER

## 2022-03-16 PROCEDURE — 85025 COMPLETE CBC W/AUTO DIFF WBC: CPT | Performed by: NURSE PRACTITIONER

## 2022-03-16 PROCEDURE — 76817 TRANSVAGINAL US OBSTETRIC: CPT

## 2022-03-16 RX ORDER — SODIUM CHLORIDE 0.9 % (FLUSH) 0.9 %
10 SYRINGE (ML) INJECTION AS NEEDED
Status: DISCONTINUED | OUTPATIENT
Start: 2022-03-16 | End: 2022-03-16 | Stop reason: HOSPADM

## 2022-03-16 RX ADMIN — SODIUM CHLORIDE, POTASSIUM CHLORIDE, SODIUM LACTATE AND CALCIUM CHLORIDE 1000 ML: 600; 310; 30; 20 INJECTION, SOLUTION INTRAVENOUS at 18:29

## 2022-03-16 NOTE — ED PROVIDER NOTES
Subjective   Patient is a 21-year-old female who is a  6 para 4 that presents ER today with complaints of abdominal cramping, nausea, and positive pregnancy test.  The patient states that she has taken 9+ pregnancy test at home over the weekend.  She states that she believes she is approximately 5 weeks pregnant.  LMP was 2022.  The patient denies any vaginal bleeding or discharge.  She denies any leaking of fluid.  She presents here today for further evaluation.      History provided by:  Patient   used: No    Abdominal Pain  Pain location:  Generalized  Pain quality: aching and cramping    Pain radiates to:  Does not radiate  Pain severity:  Moderate  Onset quality:  Sudden  Duration:  1 day  Timing:  Constant  Progression:  Unchanged  Chronicity:  New  Context: not alcohol use, not awakening from sleep, not diet changes, not eating, not laxative use, not medication withdrawal, not previous surgeries, not recent illness, not recent sexual activity, not recent travel, not retching, not sick contacts, not suspicious food intake and not trauma    Relieved by:  Nothing  Worsened by:  Nothing  Ineffective treatments:  None tried  Associated symptoms: nausea    Associated symptoms: no anorexia, no belching, no chest pain, no chills, no constipation, no cough, no diarrhea, no dysuria, no fatigue, no fever, no flatus, no hematemesis, no hematochezia, no hematuria, no melena, no shortness of breath, no sore throat, no vaginal bleeding, no vaginal discharge and no vomiting    Risk factors: pregnancy    Risk factors: no alcohol abuse, no aspirin use, not elderly, has not had multiple surgeries, no NSAID use, not obese and no recent hospitalization        Review of Systems   Constitutional: Negative for chills, fatigue and fever.   HENT: Negative for sore throat.    Respiratory: Negative for cough and shortness of breath.    Cardiovascular: Negative for chest pain.   Gastrointestinal:  Positive for abdominal pain and nausea. Negative for anorexia, constipation, diarrhea, flatus, hematemesis, hematochezia, melena and vomiting.   Genitourinary: Negative for dysuria, hematuria, vaginal bleeding and vaginal discharge.   All other systems reviewed and are negative.      Past Medical History:   Diagnosis Date   • Abnormal Pap smear of cervix    • Chlamydia     with last pregnancy   • MRSA infection     abdomen    • Ovarian cyst    • Urinary tract infection    • Vaginal delivery        No Known Allergies    Past Surgical History:   Procedure Laterality Date   •  SECTION N/A 5/10/2021    Procedure:  SECTION PRIMARY;  Surgeon: Tomas Dawkins MD;  [facility];  Service: Obstetrics/Gynecology;  Laterality: N/A;   • CHOLECYSTECTOMY     • KNEE RECONSTRUCTION, MEDIAL PATELLAR FEMORAL LIGAMENT     • KNEE SURGERY     • OVARIAN CYST SURGERY         History reviewed. No pertinent family history.    Social History     Socioeconomic History   • Marital status: Single   Tobacco Use   • Smoking status: Never Smoker   • Smokeless tobacco: Never Used   Vaping Use   • Vaping Use: Never used   Substance and Sexual Activity   • Alcohol use: No   • Drug use: No   • Sexual activity: Yes           Objective   Physical Exam  Vitals and nursing note reviewed.   Constitutional:       Appearance: Normal appearance.   HENT:      Head: Normocephalic and atraumatic.      Mouth/Throat:      Pharynx: Oropharynx is clear.   Eyes:      Conjunctiva/sclera: Conjunctivae normal.   Cardiovascular:      Rate and Rhythm: Normal rate and regular rhythm.   Pulmonary:      Effort: Pulmonary effort is normal.      Breath sounds: Normal breath sounds.   Abdominal:      General: Bowel sounds are normal.   Skin:     General: Skin is warm and dry.      Capillary Refill: Capillary refill takes less than 2 seconds.   Neurological:      General: No focal deficit present.      Mental Status: She is alert.    Psychiatric:         Mood and Affect: Mood normal.         Procedures           ED Course  ED Course as of 03/27/22 1602   Sun Mar 27, 2022   1601 Reviewed results with patient.  Advised to follow-up OB/GYN in 1 to 2 days for recheck.  Patient will need repeat quantitative hCG and ultrasound to evaluate viability of pregnancy.  Patient understands this.  She will be discharged home at this time stable condition.  Bedrest and pelvic rest until cleared by her OB/GYN. [LF]      ED Course User Index  [LF] Delores Iqbal, KELSIE                                         US Ob Transvaginal   Final Result   1.. There is what appears to be a gestational sac within the   intrauterine cavity although it is rather elongate in appearance.   Follow-up ultrasound in 7-10 days is recommended to assess viability of   the pregnancy. A fetal pole or yolk sac is not yet demonstrated. There   does appear to be a double decidual reaction.   2. Complex cyst associated with the right ovary perhaps representing a   corpus luteum cyst. There is also a small cyst adjacent to the left   ovary measuring 1.7 cm in maximum dimension. Bilateral color flow is   noted to the ovaries. There is no free fluid in the cul-de-sac.   This report was finalized on 03/16/2022 21:16 by Dr. Jag Fishman MD.        Labs Reviewed   COMPREHENSIVE METABOLIC PANEL - Abnormal; Notable for the following components:       Result Value    BUN 4 (*)     All other components within normal limits    Narrative:     GFR Normal >60  Chronic Kidney Disease <60  Kidney Failure <15     URINALYSIS W/ CULTURE IF INDICATED - Abnormal; Notable for the following components:    Appearance, UA Cloudy (*)     Leuk Esterase, UA Trace (*)     All other components within normal limits   CBC WITH AUTO DIFFERENTIAL - Abnormal; Notable for the following components:    MCH 25.2 (*)     All other components within normal limits   URINALYSIS, MICROSCOPIC ONLY - Abnormal; Notable for the  following components:    RBC, UA 0-2 (*)     WBC, UA 3-5 (*)     Bacteria, UA 1+ (*)     Squamous Epithelial Cells, UA 7-12 (*)     All other components within normal limits   HCG, QUANTITATIVE, PREGNANCY    Narrative:     HCG Ranges by Gestational Age    Females - non-pregnant premenopausal   </= 1mIU/mL HCG  Females - postmenopausal               </= 7mIU/mL HCG    3 Weeks         5.8 -    71.2 mIU/mL  4 Weeks         9.5 -     750 mIU/mL  5 Weeks         217 -   7,138 mIU/mL  6 Weeks         158 -  31,795 mIU/mL  7 Weeks       3,697 - 163,563 mIU/mL  8 Weeks      32,065 - 149,571 mIU/mL  9 Weeks      63,803 - 151,410 mIU/mL  10 Weeks     46,509 - 186,977 mIU/mL  12 Weeks     27,832 - 210,612 mIU/mL  14 Weeks     13,950 -  62,530 mIU/mL  15 Weeks     12,039 -  70,971 mIU/mL  16 Weeks      9,040 -  56,451 mIU/mL  17 Weeks      8,175 -  55,868 mIU/mL  18 Weeks      8,099 -  58,176 mIU/mL  Results may be falsely decreased if patient taking Biotin.      RHIG EVALUATION   DOSES OF RH IMMUNE GLOBULIN   CBC AND DIFFERENTIAL    Narrative:     The following orders were created for panel order CBC & Differential.  Procedure                               Abnormality         Status                     ---------                               -----------         ------                     CBC Auto Differential[[id number]]        Abnormal            Final result                 Please view results for these tests on the individual orders.             MDM  Number of Diagnoses or Management Options  Abdominal pain during pregnancy in first trimester: new and requires workup  Cyst of ovary, unspecified laterality: new and requires workup     Amount and/or Complexity of Data Reviewed  Clinical lab tests: reviewed and ordered  Tests in the radiology section of CPT®: ordered and reviewed    Patient Progress  Patient progress: stable      Final diagnoses:   Abdominal pain during pregnancy in first trimester   Cyst of ovary,  unspecified laterality       ED Disposition  ED Disposition     ED Disposition   Discharge    Condition   Stable    Comment   --             Provider, No Known  [place] [address]  [phone]    Call in 1 day           Medication List      No changes were made to your prescriptions during this visit.          Delores Iqbal, APRN  03/27/22 1602

## 2022-03-17 ENCOUNTER — TELEPHONE (OUTPATIENT)
Dept: FAMILY MEDICINE CLINIC | Facility: CLINIC | Age: 22
End: 2022-03-17

## 2022-03-17 NOTE — DISCHARGE INSTRUCTIONS
Bedrest and pelvic rest until seen by OB/GYN, return to the ER for any new or worsening symptoms.  Please follow-up in 1 to 2 days with your OB/GYN.  You will need to have a repeat ultrasound in at least 7 days.  Return to the ER for any new or worsening symptoms.

## 2022-03-17 NOTE — PATIENT INSTRUCTIONS
Patient Education        Pregnancy Test (HCG): About This Test  What is it? A pregnancy test can check to see if the hormone hCG is in your blood or urine. Your body makes this hormone when you're pregnant. Many women use home pregnancy tests to find out if they are pregnant. Why is this test done? This test will show if you are pregnant or not. If you are pregnant, your doctor will use the test results as a guide to care for you and the baby. How do you prepare for the test?  In general, there's nothing you have to do before this test, unless your doctor tells you to. How is the test done? A urine or blood test for pregnancy can be done in your doctor's office, clinic, or lab. Blood test  A health professional uses a needle to take a blood sample, usually from the arm. Urine test  You catch urine in a cup given to you by a health professional. When you are finished, you give the cup back. How accurate is the test?  This test may not show that you are pregnant if you are very early in your pregnancy. How long does the test take? The test will take just a few minutes. What happens after the test?  You can go back to your usual activities right away. If you are pregnant, you will get more information from your doctor. Follow-up care is a key part of your treatment and safety. Be sure to make and go to all appointments, and call your doctor if you are having problems. It's also a good idea to keep a list of the medicines you take. Ask your doctor when you can expect to have your test results. Where can you learn more? Go to https://geo.Post-A-Vox. org and sign in to your Sipwise account. Enter D128 in the "1,2,3 Listo" box to learn more about \"Pregnancy Test (HCG): About This Test.\"     If you do not have an account, please click on the \"Sign Up Now\" link. Current as of: June 16, 2021               Content Version: 13.1  © 8534-9354 playnik.    Care instructions adapted under license by [facility] ([facility]). If you have questions about a medical condition or this instruction, always ask your healthcare professional. [address] any warranty or liability for your use of this information. Patient Education        Learning About Pregnancy  Your Care Instructions     Your health in the early weeks of your pregnancy is particularly important for your baby's health. Take good care of yourself. Anything you do that harms your body can also harm your baby. Make sure to go to all of your doctor appointments. Regular checkups will help keep you and your baby healthy. How can you care for yourself at home? Diet    · Eat a balanced diet. Make sure your diet includes plenty of beans, peas, and leafy green vegetables.     · Do not skip meals or go for many hours without eating. If you are nauseated, try to eat a small, healthy snack every 2 to 3 hours.     · Do not eat fish that has a high level of mercury, such as shark, swordfish, or mackerel. Do not eat more than one can of tuna each week.     · Drink plenty of fluids. If you have kidney, heart, or liver disease and have to limit fluids, talk with your doctor before you increase the amount of fluids you drink.     · Cut down on caffeine, such as coffee, tea, and cola.     · Do not drink alcohol, such as beer, wine, or hard liquor.     · Take a multivitamin that contains at least 400 micrograms (mcg) of folic acid to help prevent birth defects. Fortified cereal and whole wheat bread are good additional sources of folic acid.     · Increase the calcium in your diet. Try to drink a quart of skim milk each day. You may also take calcium supplements and choose foods such as cheese and yogurt. Lifestyle    · Make sure you go to your follow-up appointments.     · Get plenty of rest. You may be unusually tired while you are pregnant.     · Get at least 30 minutes of exercise on most days of the week. Walking is a good choice. If you have not exercised in the past, start out slowly. Take several short walks each day.     · Do not smoke. If you need help quitting, talk to your doctor about stop-smoking programs. These can increase your chances of quitting for good.     · Do not touch cat feces or litter boxes. Also, wash your hands after you handle raw meat, and fully cook all meat before you eat it. Wear gloves when you work in the yard or garden, and wash your hands well when you are done. Cat feces, raw or undercooked meat, and contaminated dirt can cause an infection that may harm your baby or lead to a miscarriage.     · Do not use saunas or hot tubs. Raising your body temperature may harm your baby.     · Avoid chemical fumes, paint fumes, or poisons.     · Do not use illegal drugs, marijuana, or alcohol. Medicines    · Review all of your medicines with your doctor. Some of your routine medicines may need to be changed to protect your baby.     · Use acetaminophen (Tylenol) to relieve minor problems, such as a mild headache or backache or a mild fever with cold symptoms. Do not use nonsteroidal anti-inflammatory drugs (NSAIDs), such as ibuprofen (Advil, Motrin) or naproxen (Aleve), unless your doctor says it is okay.     · Do not take two or more pain medicines at the same time unless the doctor told you to. Many pain medicines have acetaminophen, which is Tylenol. Too much acetaminophen (Tylenol) can be harmful.     · Take your medicines exactly as prescribed. Call your doctor if you think you are having a problem with your medicine. To manage morning sickness    · If you feel sick when you first wake up, try eating a small snack (such as crackers) before you get out of bed. Allow some time to digest the snack, and then get out of bed slowly.     · Do not skip meals or go for long periods without eating.  An empty stomach can make nausea worse.     · Eat small, frequent meals instead of three large meals each day.     · Drink plenty of fluids.     · Eat foods that are high in protein but low in fat.     · If you are taking iron supplements, ask your doctor if they are necessary. Iron can make nausea worse.     · Avoid any smells, such as coffee, that make you feel sick.     · Get lots of rest. Morning sickness may be worse when you are tired. Follow-up care is a key part of your treatment and safety. Be sure to make and go to all appointments, and call your doctor if you are having problems. It's also a good idea to know your test results and keep a list of the medicines you take. Where can you learn more? Go to https://ProductifypeMolecular Imprintseb.Wongnai. org and sign in to your [employer] account. Enter Q574 in the Starbak box to learn more about \"Learning About Pregnancy. \"     If you do not have an account, please click on the \"Sign Up Now\" link. Current as of: June 16, 2021               Content Version: 13.1  © 5237-7426 Healthwise, [employer]. Care instructions adapted under license by [facility] ([facility]). If you have questions about a medical condition or this instruction, always ask your healthcare professional. [place] [address] any warranty or liability for your use of this information. Patient Education        prenatal multivitamins  Pronunciation:  PRE nay rocky VYE ta mins  What is the most important information I should know about prenatal multivitamins? Never take more than the recommended dose of a multivitamin. An overdose of vitamins A, D, E, or K or certain minerals contained in prenatal multivitamins may cause serious overdose symptoms or harm to the unborn baby. What are prenatal multivitamins? There are many brands and forms of prenatal multivitamins available. Not all brands are listed on this leaflet. Prenatal multivitamins are a combination of many different vitamins that are normally found in foods and other natural sources.  Minerals may also be contained in prenatal multivitamins. Prenatal multivitamins are used to provide the additional vitamins and minerals needed during pregnancy. Prenatal multivitamins may also be used for purposes not listed in this medication guide. What should I discuss with my healthcare provider before taking prenatal multivitamins? Some vitamins and minerals can cause serious or life-threatening side effects if taken in large doses. Do not take more of this product than directed on the label or prescribed by your doctor. Before taking prenatal multivitamins, tell your doctor about all your medical conditions and all medicines you use. Ask a doctor before using a vitamin or mineral supplement if you are on a low-salt diet. You may need to continue taking prenatal multivitamins if you breastfeed your baby. Ask your doctor about taking this product while breast-feeding. How should I take prenatal multivitamins? Use exactly as directed on the label, or as prescribed by your doctor. Never take more than the recommended dose of prenatal multivitamins. Many multivitamin products also contain minerals such as calcium, iron, magnesium, potassium, and zinc. Minerals (especially taken in large doses) can cause side effects such as tooth staining, increased urination, stomach bleeding, uneven heart rate, confusion, and muscle weakness or limp feeling. Read the label of any multivitamin product you take to make sure you are aware of what it contains. Take your prenatal multivitamin with a full glass of water. Swallow the regular tablet or capsule whole. Do not break, chew, crush, or open it. You must chew the chewable tablet before you swallow it. Read and carefully follow any Instructions for Use provided with the oral powder. Store this product in the original container at room temperature, away from moisture and heat. What happens if I miss a dose?   Take the medicine as soon as you can, but skip the missed dose if it is almost time for your next dose. Do not take two doses at one time. What happens if I overdose? Seek emergency medical attention or call the Poison Help line at 1-836.872.7727. An overdose of vitamins A, D, E, or K or certain minerals contained in prenatal multivitamins may cause serious overdose symptoms or harm to the unborn baby. Overdose symptoms may include hair loss, dry skin, rash, stomach pain, vomiting, diarrhea, constipation, loss of appetite, increased thirst or urination, weight loss, confusion, weakness, drowsiness, joint pain, muscle weakness, severe headache, tingly feeling, irregular heartbeats, yellowing of your skin, or unusual bleeding. What should I avoid while taking prenatal multivitamins? Avoid taking any other multivitamin product within 2 hours before or after you take prenatal multivitamins. Taking similar vitamin products together at the same time can result in a vitamin overdose or serious side effects. Avoid the regular use of potassium supplements or salt substitutes if your prenatal multivitamin contains potassium. Avoid taking prenatal multivitamins with milk other dairy products, calcium supplements, or antacids that contain calcium. What are the possible side effects of prenatal multivitamins? Get emergency medical help if you have signs of an allergic reaction: hives; difficulty breathing; swelling of your face, lips, tongue, or throat. When taken as directed, prenatal multivitamins are not expected to cause serious side effects. Common side effects may include:  · upset stomach;  · headache; or  · unusual or unpleasant taste in your mouth. This is not a complete list of side effects and others may occur. Call your doctor for medical advice about side effects. You may report side effects to FDA at 6-786-FDA-8648. What other drugs will affect prenatal multivitamins?   Other drugs may affect prenatal multivitamins, including prescription and over-the-counter medicines, vitamins, and herbal products. Tell your doctor about all your current medicines and any medicine you start or stop using. Where can I get more information? Your pharmacist can provide more information about prenatal multivitamins. Remember, keep this and all other medicines out of the reach of children, never share your medicines with others, and use this medication only for the indication prescribed. Every effort has been made to ensure that the information provided by [address] is accurate, up-to-date, and complete, but no guarantee is made to that effect. Drug information contained herein may be time sensitive. [facility] information has been compiled for use by healthcare practitioners and consumers in the [place] and therefore [facility] does not warrant that uses outside of the [place] are appropriate, unless specifically indicated otherwise. [facility]'s drug information does not endorse drugs, diagnose patients or recommend therapy. [facility]'s drug information is an informational resource designed to assist licensed healthcare practitioners in caring for their patients and/or to serve consumers viewing this service as a supplement to, and not a substitute for, the expertise, skill, knowledge and judgment of healthcare practitioners. The absence of a warning for a given drug or drug combination in no way should be construed to indicate that the drug or drug combination is safe, effective or appropriate for any given patient. [facility] does not assume any responsibility for any aspect of healthcare administered with the aid of information [facility] provides. The information contained herein is not intended to cover all possible uses, directions, precautions, warnings, drug interactions, allergic reactions, or adverse effects. If you have questions about the drugs you are taking, check with your doctor, nurse or pharmacist.  Copyright 0211-7773 [address]. Version: 9.01.  Revision date: 2/17/2021. Care instructions adapted under license by [facility] ([facility]). If you have questions about a medical condition or this instruction, always ask your healthcare professional. [address] any warranty or liability for your use of this information.

## 2022-03-21 ENCOUNTER — OFFICE VISIT (OUTPATIENT)
Dept: OBGYN CLINIC | Age: 22
End: 2022-03-21
Payer: MEDICAID

## 2022-03-21 VITALS
HEART RATE: 91 BPM | BODY MASS INDEX: 41.82 KG/M2 | HEIGHT: 63 IN | SYSTOLIC BLOOD PRESSURE: 119 MMHG | DIASTOLIC BLOOD PRESSURE: 85 MMHG | WEIGHT: 236 LBS

## 2022-03-21 DIAGNOSIS — N91.2 AMENORRHEA: ICD-10-CM

## 2022-03-21 DIAGNOSIS — Z32.01 POSITIVE PREGNANCY TEST: ICD-10-CM

## 2022-03-21 DIAGNOSIS — N91.2 AMENORRHEA: Primary | ICD-10-CM

## 2022-03-21 DIAGNOSIS — Z36.89 CONFIRM FETAL CARDIAC ACTIVITY USING ULTRASOUND: ICD-10-CM

## 2022-03-21 PROBLEM — O24.419 GESTATIONAL DIABETES MELLITUS: Status: RESOLVED | Noted: 2020-06-05 | Resolved: 2022-03-21

## 2022-03-21 LAB — GONADOTROPIN, CHORIONIC (HCG) QUANT: 8337 MIU/ML (ref 0–5.3)

## 2022-03-21 PROCEDURE — G8484 FLU IMMUNIZE NO ADMIN: HCPCS | Performed by: ADVANCED PRACTICE MIDWIFE

## 2022-03-21 PROCEDURE — G8417 CALC BMI ABV UP PARAM F/U: HCPCS | Performed by: ADVANCED PRACTICE MIDWIFE

## 2022-03-21 PROCEDURE — 99213 OFFICE O/P EST LOW 20 MIN: CPT | Performed by: ADVANCED PRACTICE MIDWIFE

## 2022-03-21 PROCEDURE — 1036F TOBACCO NON-USER: CPT | Performed by: ADVANCED PRACTICE MIDWIFE

## 2022-03-21 PROCEDURE — G8427 DOCREV CUR MEDS BY ELIG CLIN: HCPCS | Performed by: ADVANCED PRACTICE MIDWIFE

## 2022-03-21 RX ORDER — PROMETHAZINE HYDROCHLORIDE 25 MG/1
25 TABLET ORAL 4 TIMES DAILY PRN
Qty: 20 TABLET | Refills: 0 | Status: SHIPPED | OUTPATIENT
Start: 2022-03-21 | End: 2022-03-28

## 2022-03-21 ASSESSMENT — ENCOUNTER SYMPTOMS
NAUSEA: 1
RESPIRATORY NEGATIVE: 1
ALLERGIC/IMMUNOLOGIC NEGATIVE: 1
EYES NEGATIVE: 1

## 2022-03-21 NOTE — PROGRESS NOTES
[facility]  CNM Office Note    Florentino Holcomb is a 24 y.o. female who presents today for her medical conditions/ complaints as noted below. Chief Complaint   Patient presents with    Confirmation     +HPT LMP 22         HPI  Madeleine Diggs presents for pregnancy confirmation. She is 10 days late. Has had multiple positive pregnancy tests at home. She is nervous - was not trying. This is a surprise. Patient Active Problem List   Diagnosis    Liver enzyme elevation    Choledocholithiasis    Abdominal pain, right upper quadrant    Elevated bilirubin    Gallstones    Gestational diabetes mellitus       Patient's last menstrual period was 2022 (exact date). [id number]    Past Medical History:   Diagnosis Date    Diabetes mellitus ([facility] [address]     gestational, last pregnancy    Mental disorder depression    diagnosed in  with major depression, counseling monthly, off medications. period of self mutilation(cutting) followed by suicide attempt with plan to overdose on brother's adhd medication     Past Surgical History:   Procedure Laterality Date     SECTION      CHOLECYSTECTOMY, LAPAROSCOPIC N/A 2017    CHOLECYSTECTOMY LAPAROSCOPIC, with cholangiogram performed by Lynn Acevedo MD at [place] [address] Left broke below knee cap    OVARIAN CYST REMOVAL Right 07/15/2016     Family History   Problem Relation Age of Onset    Cancer Father     Cancer Paternal Aunt     Diabetes Paternal Grandfather      Social History     Tobacco Use    Smoking status: Never Smoker    Smokeless tobacco: Never Used   Substance Use Topics    Alcohol use: No       No current outpatient medications on file. No current facility-administered medications for this visit. Allergies   Allergen Reactions    Latex Rash     Vitals:    22 0828   BP: 119/85   Pulse: 91   Weight: 236 lb (107 kg)   Height: 5' 3\" (1.6 m)     Body mass index is 41.81 kg/m².     Review of Systems Constitutional: Negative. HENT: Negative. Eyes: Negative. Respiratory: Negative. Cardiovascular: Negative. Gastrointestinal: Positive for nausea. Endocrine: Negative. Genitourinary: Negative. Musculoskeletal: Negative. Skin: Negative. Allergic/Immunologic: Negative. Neurological: Negative. Hematological: Negative. Psychiatric/Behavioral: Negative. Physical Exam  Constitutional:       Appearance: She is well-developed. She is not diaphoretic. HENT:      Head: Normocephalic and atraumatic. Nose: Nose normal.   Eyes:      Conjunctiva/sclera: Conjunctivae normal.      Pupils: Pupils are equal, round, and reactive to light. Neck:      Thyroid: No thyromegaly. Trachea: No tracheal deviation. Pulmonary:      Effort: Pulmonary effort is normal. No respiratory distress. Musculoskeletal:         General: Normal range of motion. Cervical back: Normal range of motion and neck supple. Comments: Normal ROM for upper and lower extremities. Gait steady. Skin:     General: Skin is warm and dry. Findings: No lesion or rash. Neurological:      Mental Status: She is alert and oriented to person, place, and time. Psychiatric:         Speech: Speech normal.         Behavior: Behavior normal.          Diagnosis Orders   1. Amenorrhea     2. Confirm fetal cardiac activity using ultrasound         MEDICATIONS:  No orders of the defined types were placed in this encounter. ORDERS:  No orders of the defined types were placed in this encounter. PLAN:  1. Nausea - rx for promethazine  2. Confirmation - hcg and u/s to follow. Congratulations [employer] on your positive pregnancy test! Today we will check your pregnancy hormone levels and plan for your next visit. A list of safe medications was provided and discussed.    We discussed avoidance of listeriosis - no deli meats unless heated until steaming hot, no soft cheeses - Brie, Haley, Justina, blue-veined cheeses, queso fritz and [employer], no unpasteurized milk or dairy; avoidance of toxoplasmosis, i.e. no undercooked meat or eggs, no gardening barefoot or without gloves on, and pt is not to change cat litter; avoidance of mercury, i.e. no shark, marie mackerel, tilefish, or swordfish, and limit seafood to 12 oz/wk; limit caffeine intake to 200mg/day; traveling OK but encouraged frequent ambulation; recommended no lifting over 25lbs without assistance; encouraged exercise, healthy diet, and increased water consumption; no tanning beds, hot tubs, xrays or saunas. No smoking, alcohol, or illicit drugs; OK to do hair, nails, paint (latex), and routine housecleaning with good ventilation. Certain labs and ultrasounds are required at certain times during pregnancy but others are optional, including the serum integrated screen/[place]/AFP/ Panorama, and other genetic testing. The patient was encouraged to attend childbirth classes and general hospital information was provided based on patients hospital of choice.

## 2022-04-06 RX ORDER — BLOOD SUGAR DIAGNOSTIC
STRIP MISCELLANEOUS
Qty: 100 STRIP | Refills: 1 | Status: SHIPPED | OUTPATIENT
Start: 2022-04-06 | End: 2022-04-26

## 2022-04-26 ENCOUNTER — INITIAL PRENATAL (OUTPATIENT)
Dept: OBGYN CLINIC | Age: 22
End: 2022-04-26
Payer: MEDICAID

## 2022-04-26 VITALS
DIASTOLIC BLOOD PRESSURE: 86 MMHG | BODY MASS INDEX: 41.27 KG/M2 | WEIGHT: 233 LBS | SYSTOLIC BLOOD PRESSURE: 123 MMHG | HEART RATE: 89 BPM

## 2022-04-26 DIAGNOSIS — O34.219 PREGNANCY WITH HISTORY OF CESAREAN SECTION, ANTEPARTUM: ICD-10-CM

## 2022-04-26 DIAGNOSIS — Z36.9 ANTENATAL SCREENING ENCOUNTER: ICD-10-CM

## 2022-04-26 DIAGNOSIS — Z3A.10 10 WEEKS GESTATION OF PREGNANCY: Primary | ICD-10-CM

## 2022-04-26 DIAGNOSIS — Z34.81 ENCOUNTER FOR SUPERVISION OF OTHER NORMAL PREGNANCY IN FIRST TRIMESTER: ICD-10-CM

## 2022-04-26 DIAGNOSIS — Z86.32 PERSONAL HISTORY OF GESTATIONAL DIABETES: ICD-10-CM

## 2022-04-26 LAB
ABO/RH: NORMAL
ANTIBODY SCREEN: NORMAL
HEPATITIS C ANTIBODY INTERPRETATION: NORMAL

## 2022-04-26 PROCEDURE — 99213 OFFICE O/P EST LOW 20 MIN: CPT | Performed by: ADVANCED PRACTICE MIDWIFE

## 2022-04-26 PROCEDURE — G8427 DOCREV CUR MEDS BY ELIG CLIN: HCPCS | Performed by: ADVANCED PRACTICE MIDWIFE

## 2022-04-26 PROCEDURE — G8417 CALC BMI ABV UP PARAM F/U: HCPCS | Performed by: ADVANCED PRACTICE MIDWIFE

## 2022-04-26 PROCEDURE — 1036F TOBACCO NON-USER: CPT | Performed by: ADVANCED PRACTICE MIDWIFE

## 2022-04-26 NOTE — PATIENT INSTRUCTIONS
Patient Education        Weeks 6 to 10 of Your Pregnancy: Care Instructions  Overview     During the first 6 to 10 weeks of your pregnancy, your body goes through many changes. Your baby grows very quickly, even though you can't feel it yet. You may start to feel different, both in your body and your emotions. Because each pregnancy is unique, there's no right way to feel. You may feel the healthiest you've ever been, or you might feel tired or sick to your stomach (\"morningsickness\"). These early weeks are a time to make healthy choices and to eat the best foodsfor you and your baby. This is also a good time to think about birth defects testing. These are tests done during pregnancy to look for possible problems with the baby. First-trimester tests for birth defects can be done between 8 and 13 weeks of pregnancy, depending on the test. Talk with your doctor about what kinds oftests are available. Follow-up care is a key part of your treatment and safety. Be sure to make and go to all appointments, and call your doctor if you are having problems. It's also a good idea to know your test results and keep alist of the medicines you take. How can you care for yourself at home? Eat well   Eat at least 3 meals and 2 healthy snacks every day. Eat fresh, whole foods, including:  ? 7 or more servings of bread, tortillas, cereal, rice, pasta, or oatmeal.  ? 3 or more servings of vegetables, especially leafy green vegetables. ? 2 or more servings of fruits. ? 3 or more servings of milk, yogurt, or cheese. ? 2 or more servings of meat, turkey, chicken, fish, eggs, or dried beans.  Drink plenty of fluids, especially water. Avoid sodas and other sweetened drinks.  Choose foods that have important vitamins for your baby, such as calcium, iron, and folate.   ? Dairy products, tofu, canned fish with bones, almonds, broccoli, dark leafy greens, corn tortillas, and fortified orange juice are good sources of calcium. ? Beef, poultry, liver, spinach, lentils, dried beans, fortified cereals, and dried fruits are rich in iron. ? Dark leafy greens, broccoli, asparagus, liver, fortified cereals, orange juice, peanuts, and almonds are good sources of folate.  Avoid foods that could harm your baby. ? Do not eat raw or undercooked meat, chicken, or fish (such as sushi or raw oysters). ? Do not eat raw eggs or foods that contain raw eggs, such as Caesar dressing. ? Do not eat soft cheeses and unpasteurized dairy foods, such as Brie, feta, or blue cheese. ? Do not eat fish that contains a lot of mercury, such as shark, swordfish, tilefish, or marie mackerel. Limit some other types of fish, such as white (albacore) tuna, to 4 oz (0.1 kg) a week. ? Do not eat raw sprouts, especially alfalfa sprouts. ? Cut down on caffeine, such as coffee, tea, and cola. Protect yourself and your baby   Do not touch bobby litter or cat feces. They can cause an infection that could harm your baby.  Avoid things that can make your body too hot and may be harmful to your baby, such as a hot tub or sauna. Or talk with your doctor before doing anything that raises your body temperature. Your doctor can tell you if it's safe. [place] with morning sickness   Sip small amounts of water, juices, or shakes. Try drinking between meals, not with meals.  Eat 5 or 6 small meals a day. Try dry toast or crackers when you first get up, and eat breakfast a little later.  Avoid spicy, greasy, and fatty foods.  When you feel sick, open your windows or go for a short walk to get fresh air.  Try nausea wristbands. These help some people.  Tell your doctor if you think your prenatal vitamins make you sick. Where can you learn more? Go to https://geo.health-partners. org and sign in to your Pressable account. Enter G112 in the [facility] box to learn more about \"Weeks 6 to 10 of Your Pregnancy: Care Instructions. \"     If you do not have an account, please click on the \"Sign Up Now\" link. Current as of: June 16, 2021               Content Version: 13.2  © 2006-2022 Fan[employer]. Care instructions adapted under license by [facility] ([facility]). If you have questions about a medical condition or this instruction, always ask your healthcare professional. [address] any warranty or liability for your use of this information. Patient Education        Nutrition During Pregnancy: Care Instructions  Overview     Healthy eating when you are pregnant is important for you and your baby. It can help you feel well and have a successful pregnancy and delivery. During pregnancy your nutrition needs increase. Even if you have excellent eating habits, your doctor may recommend a multivitamin to make sure you get enoughiron and folic acid. You may wonder how much weight you should gain. In general, if you were at a healthy weight before you became pregnant, then you should gain between 25 and 35 pounds. If you were overweight before pregnancy, then you'll likely be advised to gain 15 to 25 pounds. If you were underweight before pregnancy, then you'll probably be advised to gain 28 to 40 pounds. Your doctor will work with you to set a weight goal that is right for you. Gaining a healthy amount ofweight helps you have a healthy baby. Follow-up care is a key part of your treatment and safety. Be sure to make and go to all appointments, and call your doctor if you are having problems. It's also a good idea to know your test results and keep alist of the medicines you take. How can you care for yourself at home?  Eat plenty of fruits and vegetables. Include a variety of orange, yellow, and leafy dark-green vegetables every day.  Choose whole-grain bread, cereal, and pasta. Good choices include whole wheat bread, whole wheat pasta, brown rice, and oatmeal.   Get 4 or more servings of milk and milk products each day. Good choices include nonfat or low-fat milk, yogurt, and cheese. If you cannot eat milk products, you can get calcium from calcium-fortified products such as orange juice, soy milk, and tofu. Other non-milk sources of calcium include leafy green vegetables, such as broccoli, kale, mustard greens, turnip greens, bok yohana, and brussels sprouts.  If you eat meat, pick lower-fat types. Good choices include lean cuts of meat and chicken or turkey without the skin.  Do not eat shark, swordfish, marie mackerel, or tilefish. They have high levels of mercury, which is dangerous to your baby. You can eat up to 12 ounces a week of fish or shellfish that have low mercury levels. Good choices include shrimp, wild salmon, pollock, and catfish. Limit some other types of fish, such as white (albacore) tuna, to 4 oz (0.1 kg) a week.  Heat lunch meats (such as turkey, ham, or bologna) to 165°F before you eat them. This reduces your risk of getting sick from a kind of bacteria that can be found in lunch meats.  Do not eat unpasteurized soft cheeses, such as brie, feta, fresh mozzarella, and blue cheese. They have a bacteria that could harm your baby.  Limit caffeine. If you drink coffee or tea, have no more than 1 cup a day. Caffeine is also found in crissy.  Do not drink any alcohol. No amount of alcohol has been found to be safe during pregnancy.  Do not diet or try to lose weight. For example, do not follow a low-carbohydrate diet. If you are overweight at the start of your pregnancy, your doctor will work with you to manage your weight gain.  Tell your doctor about all vitamins and supplements you take. When should you call for help? Watch closely for changes in your health, and be sure to contact your doctor if you have any problems. Where can you learn more? Go to https://geo.health-partners. org and sign in to your Sunshine account.  Enter Y785 in the [employer] box to learn more about \"Nutrition During Pregnancy: Care Instructions. \"     If you do not have an account, please click on the \"Sign Up Now\" link. Current as of: September 8, 2021               Content Version: 13.2  © 6227-6134 Healthwise, Incorporated. Care instructions adapted under license by [facility] ([facility]). If you have questions about a medical condition or this instruction, always ask your healthcare professional. [address] any warranty or liability for your use of this information.

## 2022-04-26 NOTE — PROGRESS NOTES
CNM Prenatal Office Note  Subjective:  Rex Lomeli is here for a return obstetrical visit. Today she is 10w6d weeks EGA. She is doing well and has no unusual complaints. She does not have vaginal bleeding, n/v, dizziness, or cramping. Objective: Mother's Prenatal Vitals  BP: 123/86  Weight: 233 lb (105.7 kg)  Pulse: 89  Patient Position: Sitting  Prenatal Fetal Information  Fetal Heart Rate: US  Movement: Absent  Pt is A&Ox3, in no acute distress. Normocephalic, atraumatic. PERRL. Resp even and non-labored. Skin pink, warm & dry. Gravid abdomen. GONZALEZ's well. Gait steady. Assessment:    IUP at 10w6d wks      Diagnosis Orders   1. 10 weeks gestation of pregnancy     2. Encounter for supervision of other normal pregnancy in first trimester     3.  screening encounter     4. Pregnancy with history of  section, antepartum       Plan:   Pt counseled on Genetic testing   Continue with routine prenatal care.  RTC in 4 wks for prenatal visit    MEDICATIONS:  No orders of the defined types were placed in this encounter. ORDERS:  No orders of the defined types were placed in this encounter. More than 50% of this 20 min visit was education and counseling.

## 2022-04-26 NOTE — PROGRESS NOTES
Patient presents today for routine prenatal care. Pt denies any vaginal leaking bleeding or contractions. Patient desires , but had a Primary CS in . She likes both [facility] and [facility] but [facility] has delivered or helped deliver her other baby(s), so she desires [facility] to deliver. She is really nervous today or this pregnancy and doesn't know why. She has her anatomy scan scheduled for 2022, she believes.

## 2022-04-27 ENCOUNTER — TELEPHONE (OUTPATIENT)
Dept: OBGYN CLINIC | Age: 22
End: 2022-04-27

## 2022-04-27 LAB
C. TRACHOMATIS DNA ,URINE: NEGATIVE
N. GONORRHOEAE DNA, URINE: NEGATIVE
TRICHOMONAS VAGINALIS DNA, URINE: NEGATIVE

## 2022-04-27 NOTE — TELEPHONE ENCOUNTER
Advised pt of results. Pt voiced understanding    ----- Message from VELMA Tirado CNM sent at 4/27/2022  8:14 AM CDT -----  Your prenatal labs look great! We will review in detail at your next visit. If you have any questions please do not hesitate to contact me.  - Maribell    Needs to be on Everpixt

## 2022-04-28 LAB
BASOPHILS ABSOLUTE: 0 K/UL (ref 0–0.2)
BASOPHILS RELATIVE PERCENT: 0.1 % (ref 0–1)
EOSINOPHILS ABSOLUTE: 0.1 K/UL (ref 0–0.6)
EOSINOPHILS RELATIVE PERCENT: 0.9 % (ref 0–5)
HCT VFR BLD CALC: 38.8 % (ref 37–47)
HEMOGLOBIN: 12.9 G/DL (ref 12–16)
HEPATITIS B SURFACE ANTIGEN INTERPRETATION: ABNORMAL
HIV-1 P24 AG: ABNORMAL
IMMATURE GRANULOCYTES #: 0 K/UL
LYMPHOCYTES ABSOLUTE: 2 K/UL (ref 1.1–4.5)
LYMPHOCYTES RELATIVE PERCENT: 25 % (ref 20–40)
MCH RBC QN AUTO: 26.2 PG (ref 27–31)
MCHC RBC AUTO-ENTMCNC: 33.2 G/DL (ref 33–37)
MCV RBC AUTO: 78.7 FL (ref 81–99)
MONOCYTES ABSOLUTE: 0.5 K/UL (ref 0–0.9)
MONOCYTES RELATIVE PERCENT: 6.1 % (ref 0–10)
NEUTROPHILS ABSOLUTE: 5.4 K/UL (ref 1.5–7.5)
NEUTROPHILS RELATIVE PERCENT: 67.6 % (ref 50–65)
PDW BLD-RTO: 13.3 % (ref 11.5–14.5)
PLATELET # BLD: 285 K/UL (ref 130–400)
PMV BLD AUTO: 9.9 FL (ref 9.4–12.3)
RAPID HIV 1&2: ABNORMAL
RBC # BLD: 4.93 M/UL (ref 4.2–5.4)
RPR: ABNORMAL
RUBELLA ANTIBODY IGG: ABNORMAL
URINE CULTURE, ROUTINE: NORMAL
WBC # BLD: 8 K/UL (ref 4.8–10.8)

## 2022-05-01 LAB
HERPES TYPE 1/2 IGM COMBINED: 1.55 IV
HERPES TYPE I/II IGG COMBINED: 20.2 IV
HSV 1 GLYCOPROTEIN G AB IGG: 35.7 IV
HSV 2 GLYCOPROTEIN G AB IGG: 0.08 IV

## 2022-05-02 LAB — VZV IGG SER QL IA: 1.59

## 2022-05-06 ENCOUNTER — TELEPHONE (OUTPATIENT)
Dept: OBGYN CLINIC | Age: 22
End: 2022-05-06

## 2022-05-20 ENCOUNTER — ROUTINE PRENATAL (OUTPATIENT)
Dept: OBGYN CLINIC | Age: 22
End: 2022-05-20
Payer: MEDICAID

## 2022-05-20 VITALS
DIASTOLIC BLOOD PRESSURE: 73 MMHG | BODY MASS INDEX: 40.92 KG/M2 | HEART RATE: 89 BPM | WEIGHT: 231 LBS | SYSTOLIC BLOOD PRESSURE: 105 MMHG

## 2022-05-20 DIAGNOSIS — O26.892 PREGNANCY HEADACHE IN SECOND TRIMESTER: ICD-10-CM

## 2022-05-20 DIAGNOSIS — R51.9 PREGNANCY HEADACHE IN SECOND TRIMESTER: ICD-10-CM

## 2022-05-20 DIAGNOSIS — Z3A.14 14 WEEKS GESTATION OF PREGNANCY: ICD-10-CM

## 2022-05-20 DIAGNOSIS — Z34.82 ENCOUNTER FOR SUPERVISION OF OTHER NORMAL PREGNANCY IN SECOND TRIMESTER: Primary | ICD-10-CM

## 2022-05-20 PROCEDURE — 99213 OFFICE O/P EST LOW 20 MIN: CPT | Performed by: NURSE PRACTITIONER

## 2022-05-20 PROCEDURE — G8427 DOCREV CUR MEDS BY ELIG CLIN: HCPCS | Performed by: NURSE PRACTITIONER

## 2022-05-20 PROCEDURE — G8417 CALC BMI ABV UP PARAM F/U: HCPCS | Performed by: NURSE PRACTITIONER

## 2022-05-20 PROCEDURE — 1036F TOBACCO NON-USER: CPT | Performed by: NURSE PRACTITIONER

## 2022-05-20 NOTE — PROGRESS NOTES
Pt is here for routine parental care. Pt denies vaginal bleeding, cramping or leaking of fluid. She states she has noticed more headaches happening every day.

## 2022-05-20 NOTE — PATIENT INSTRUCTIONS
Patient Education        Weeks 14 to 18 of Your Pregnancy: Care Instructions  Overview     During this time, you may start to \"show,\" so that you look pregnant to people around you. You may also notice some changes in your skin, such as itchy spotson your palms or acne on your face. Your baby is now able to pass urine. And your baby's first stool (meconium) is starting to collect in your baby's intestines. Hair is also starting to grow onyour baby's head. At your next visit, between weeks 18 and 20, your doctor may do an ultrasound test. The test allows your doctor to check for certain problems. Your doctor can also tell the sex of your baby. So this a good time to think about whetheryou want to know. Talk to your doctor about getting a flu shot to help keep you healthy duringyour pregnancy. As your pregnancy moves along, it's common to worry or feel anxious. Your body is changing a lot. And you are thinking about giving birth, the health of your baby, and becoming a parent. You can talk to your doctor about any anxiety andstress you feel. Follow-up care is a key part of your treatment and safety. Be sure to make and go to all appointments, and call your doctor if you are having problems. It's also a good idea to know your test results and keep alist of the medicines you take. How can you care for yourself at home? Reduce stress     Ask for help with cooking and housekeeping.      Figure out who or what causes your stress. Avoid these people or situations as much as possible.      Relax every day. Taking 10- to 15-minute breaks can make a big difference. Take a walk, listen to music, or take a warm bath.      Learn relaxation techniques at prenatal or yoga class. Or buy a relaxation tape.      List your fears about having a baby and becoming a parent. Share the list with someone you trust. Decide which worries are really small, and try to let them go.    Exercise     If you did not exercise much before pregnancy, start slowly. Walking is best. [employer], and do a little more every day.      Brisk walking, easy jogging, low-impact aerobics, water aerobics, and yoga are good choices. Some sports, such as scuba diving, horseback riding, downhill skiing, gymnastics, and water skiing, are not a good idea.      Try to do at least 2½ hours a week of moderate exercise, such as a fast walk. One way to do this is to be active 30 minutes a day, at least 5 days a week.      Wear loose clothing. And wear shoes and a bra that provide good support.      Warm up and cool down to start and finish your exercise.      If you want to use weights, be sure to use light weights. They reduce stress on your joints. Stay at the best weight for you     Experts recommend that you gain about 1 pound a month during the first 3 months of your pregnancy.      Experts recommend that you gain about 1 pound a week during your last 6 months of pregnancy, for a total weight gain of 25 to 35 pounds.      If you are underweight, you will need to gain more weight (about 28 to 40 pounds).      If you are overweight, you may not need to gain as much weight (about 15 to 25 pounds).      If you are gaining weight too fast, use common sense. Exercise every day, and limit sweets, fast foods, and fats. Choose lean meats, fruits, and vegetables.      If you are having twins or more, your doctor may refer you to a dietitian. Where can you learn more? Go to https://"[employer]ociety[employer]jamesFactonomy.Quri. org and sign in to your [facility] account. Enter A202 in the [facility] box to learn more about \"Weeks 14 to 18 of Your Pregnancy: Care Instructions. \"     If you do not have an account, please click on the \"Sign Up Now\" link. Current as of: June 16, 2021               Content Version: 13.2  © 6281-7641 Healthwise, Incorporated. Care instructions adapted under license by [facility] ([facility]).  If you have questions about a medical condition or this instruction, always ask your healthcare professional. [place] [address] any warranty or liability for your use of this information.

## 2022-05-20 NOTE — PROGRESS NOTES
CNM Prenatal Office Note  Subjective:  Aleyda Jorge is here for a return obstetrical visit. Today she is 14w2d weeks EGA. She is doing well, taking her PNV as directed, and has no complaints. She  does not have vaginal bleeding, n/v, syncope, or headaches. Pt does feel fetal movement regularly. Objective: Mother's Prenatal Vitals  BP: 105/73  Weight: 231 lb (104.8 kg)  Pulse: 89  Patient Position: Sitting  Prenatal Fetal Information  Fetal Heart Rate:   Movement: Absent  Pt is A&Ox3, in no acute distress. Normocephalic, atraumatic. PERRL. Resp even and non-labored. Skin pink, warm & dry. Gravid abdomen. GONZALEZ's well. Gait steady. Assessment:    IUP at 14w2d wks      Diagnosis Orders   1. Encounter for supervision of other normal pregnancy in second trimester     2. 14 weeks gestation of pregnancy     3. Pregnancy headache in second trimester       Plan:   Pt counseled on balanced nutrition, adequate fluid intake, taking PNV daily, and exercise along with Genetic testing   Continue with routine prenatal care.  RTC in 4 wks for prenatal visit      MEDICATIONS:  No orders of the defined types were placed in this encounter. ORDERS:  No orders of the defined types were placed in this encounter. More than 50% of this 20 min visit was education and counseling.

## 2022-06-10 ENCOUNTER — ROUTINE PRENATAL (OUTPATIENT)
Dept: OBGYN CLINIC | Age: 22
End: 2022-06-10
Payer: MEDICAID

## 2022-06-10 VITALS
DIASTOLIC BLOOD PRESSURE: 68 MMHG | SYSTOLIC BLOOD PRESSURE: 112 MMHG | BODY MASS INDEX: 41.27 KG/M2 | HEART RATE: 98 BPM | WEIGHT: 233 LBS

## 2022-06-10 DIAGNOSIS — O09.292 HX OF GESTATIONAL DIABETES IN PRIOR PREGNANCY, CURRENTLY PREGNANT, SECOND TRIMESTER: ICD-10-CM

## 2022-06-10 DIAGNOSIS — Z86.32 HX OF GESTATIONAL DIABETES IN PRIOR PREGNANCY, CURRENTLY PREGNANT, SECOND TRIMESTER: ICD-10-CM

## 2022-06-10 DIAGNOSIS — O26.852 SPOTTING AFFECTING PREGNANCY IN SECOND TRIMESTER: ICD-10-CM

## 2022-06-10 DIAGNOSIS — Z34.82 ENCOUNTER FOR SUPERVISION OF OTHER NORMAL PREGNANCY IN SECOND TRIMESTER: Primary | ICD-10-CM

## 2022-06-10 DIAGNOSIS — Z3A.17 17 WEEKS GESTATION OF PREGNANCY: ICD-10-CM

## 2022-06-10 PROCEDURE — G8417 CALC BMI ABV UP PARAM F/U: HCPCS | Performed by: NURSE PRACTITIONER

## 2022-06-10 PROCEDURE — 99213 OFFICE O/P EST LOW 20 MIN: CPT | Performed by: NURSE PRACTITIONER

## 2022-06-10 PROCEDURE — 1036F TOBACCO NON-USER: CPT | Performed by: NURSE PRACTITIONER

## 2022-06-10 PROCEDURE — G8427 DOCREV CUR MEDS BY ELIG CLIN: HCPCS | Performed by: NURSE PRACTITIONER

## 2022-06-10 NOTE — PROGRESS NOTES
CNM Prenatal Office Note  Subjective:  Ulysses Posey is here for a return obstetrical visit. Today she is 17w2d weeks EGA. She is doing well, taking her PNV as directed, and has no complaints. She  does not have vaginal bleeding, n/v, syncope, or headaches. Pt does feel fetal movement regularly. Objective: Mother's Prenatal Vitals  BP: (!) 143/78  Weight: 233 lb (105.7 kg)  Heart Rate: 98  Patient Position: Sitting  Prenatal Fetal Information  Fetal Heart Rate:   Movement: Absent  Pt is A&Ox3, in no acute distress. Normocephalic, atraumatic. PERRL. Resp even and non-labored. Skin pink, warm & dry. Gravid abdomen. GONZALEZ's well. Gait steady. Assessment:    IUP at 17w2d wks      Diagnosis Orders   1. Encounter for supervision of other normal pregnancy in second trimester     2. Spotting affecting pregnancy in second trimester     3. 17 weeks gestation of pregnancy     4. Hx of gestational diabetes in prior pregnancy, currently pregnant, second trimester  Glucose 1 Hour Postprandial    CBC with Auto Differential     Plan:   Pt counseled on balanced nutrition, adequate fluid intake, taking PNV daily, and exercise along with early glucose screening   Continue with routine prenatal care.  RTC in 4 wks for prenatal visit      MEDICATIONS:  No orders of the defined types were placed in this encounter. ORDERS:  Orders Placed This Encounter   Procedures    Glucose 1 Hour Postprandial    CBC with Auto Differential       More than 50% of this 20 min visit was education and counseling.

## 2022-06-10 NOTE — PATIENT INSTRUCTIONS
Patient Education        Weeks 14 to 18 of Your Pregnancy: Care Instructions  Overview     During this time, you may start to \"show,\" so that you look pregnant to people around you. You may also notice some changes in your skin, such as itchy spotson your palms or acne on your face. Your baby is now able to pass urine. And your baby's first stool (meconium) is starting to collect in your baby's intestines. Hair is also starting to grow onyour baby's head. At your next visit, between weeks 18 and 20, your doctor may do an ultrasound test. The test allows your doctor to check for certain problems. Your doctor can also tell the sex of your baby. So this a good time to think about whetheryou want to know. Talk to your doctor about getting a flu shot to help keep you healthy duringyour pregnancy. As your pregnancy moves along, it's common to worry or feel anxious. Your body is changing a lot. And you are thinking about giving birth, the health of your baby, and becoming a parent. You can talk to your doctor about any anxiety andstress you feel. Follow-up care is a key part of your treatment and safety. Be sure to make and go to all appointments, and call your doctor if you are having problems. It's also a good idea to know your test results and keep alist of the medicines you take. How can you care for yourself at home? Reduce stress     Ask for help with cooking and housekeeping.      Figure out who or what causes your stress. Avoid these people or situations as much as possible.      Relax every day. Taking 10- to 15-minute breaks can make a big difference. Take a walk, listen to music, or take a warm bath.      Learn relaxation techniques at prenatal or yoga class. Or buy a relaxation tape.      List your fears about having a baby and becoming a parent. Share the list with someone you trust. Decide which worries are really small, and try to let them go.    Exercise     If you did not exercise much before pregnancy, start slowly. Walking is best. [employer], and do a little more every day.      Brisk walking, easy jogging, low-impact aerobics, water aerobics, and yoga are good choices. Some sports, such as scuba diving, horseback riding, downhill skiing, gymnastics, and water skiing, are not a good idea.      Try to do at least 2½ hours a week of moderate exercise, such as a fast walk. One way to do this is to be active 30 minutes a day, at least 5 days a week.      Wear loose clothing. And wear shoes and a bra that provide good support.      Warm up and cool down to start and finish your exercise.      If you want to use weights, be sure to use light weights. They reduce stress on your joints. Stay at the best weight for you     Experts recommend that you gain about 1 pound a month during the first 3 months of your pregnancy.      Experts recommend that you gain about 1 pound a week during your last 6 months of pregnancy, for a total weight gain of 25 to 35 pounds.      If you are underweight, you will need to gain more weight (about 28 to 40 pounds).      If you are overweight, you may not need to gain as much weight (about 15 to 25 pounds).      If you are gaining weight too fast, use common sense. Exercise every day, and limit sweets, fast foods, and fats. Choose lean meats, fruits, and vegetables.      If you are having twins or more, your doctor may refer you to a dietitian. Where can you learn more? Go to https://JoMaJajamesKinesio Capture.Epizyme. org and sign in to your Cheers account. Enter U839 in the [facility] box to learn more about \"Weeks 14 to 18 of Your Pregnancy: Care Instructions. \"     If you do not have an account, please click on the \"Sign Up Now\" link. Current as of: June 16, 2021               Content Version: 13.2  © 3654-5059 Healthwise, Incorporated. Care instructions adapted under license by [facility] ([facility]).  If you have questions about a medical condition or this instruction, always ask your healthcare professional. [place] [address] any warranty or liability for your use of this information.

## 2022-06-16 DIAGNOSIS — O26.892 DYSURIA DURING PREGNANCY IN SECOND TRIMESTER: Primary | ICD-10-CM

## 2022-06-16 DIAGNOSIS — R30.0 DYSURIA DURING PREGNANCY IN SECOND TRIMESTER: Primary | ICD-10-CM

## 2022-06-17 DIAGNOSIS — O26.892 DYSURIA DURING PREGNANCY IN SECOND TRIMESTER: ICD-10-CM

## 2022-06-17 DIAGNOSIS — Z86.32 HX OF GESTATIONAL DIABETES IN PRIOR PREGNANCY, CURRENTLY PREGNANT, SECOND TRIMESTER: ICD-10-CM

## 2022-06-17 DIAGNOSIS — R30.0 DYSURIA DURING PREGNANCY IN SECOND TRIMESTER: ICD-10-CM

## 2022-06-17 DIAGNOSIS — O09.292 HX OF GESTATIONAL DIABETES IN PRIOR PREGNANCY, CURRENTLY PREGNANT, SECOND TRIMESTER: ICD-10-CM

## 2022-06-17 LAB
BASOPHILS ABSOLUTE: 0 K/UL (ref 0–0.2)
BASOPHILS RELATIVE PERCENT: 0.2 % (ref 0–1)
EOSINOPHILS ABSOLUTE: 0.1 K/UL (ref 0–0.6)
EOSINOPHILS RELATIVE PERCENT: 0.8 % (ref 0–5)
GLUCOSE, 1HR PP: 156 MG/DL (ref 75–140)
HCT VFR BLD CALC: 35.6 % (ref 37–47)
HEMOGLOBIN: 11.4 G/DL (ref 12–16)
IMMATURE GRANULOCYTES #: 0 K/UL
LYMPHOCYTES ABSOLUTE: 1.6 K/UL (ref 1.1–4.5)
LYMPHOCYTES RELATIVE PERCENT: 27.4 % (ref 20–40)
MCH RBC QN AUTO: 26.2 PG (ref 27–31)
MCHC RBC AUTO-ENTMCNC: 32 G/DL (ref 33–37)
MCV RBC AUTO: 81.8 FL (ref 81–99)
MONOCYTES ABSOLUTE: 0.3 K/UL (ref 0–0.9)
MONOCYTES RELATIVE PERCENT: 5.2 % (ref 0–10)
NEUTROPHILS ABSOLUTE: 3.9 K/UL (ref 1.5–7.5)
NEUTROPHILS RELATIVE PERCENT: 66.2 % (ref 50–65)
PDW BLD-RTO: 13.2 % (ref 11.5–14.5)
PLATELET # BLD: 227 K/UL (ref 130–400)
PMV BLD AUTO: 10.2 FL (ref 9.4–12.3)
RBC # BLD: 4.35 M/UL (ref 4.2–5.4)
WBC # BLD: 5.9 K/UL (ref 4.8–10.8)

## 2022-06-19 LAB
ORGANISM: ABNORMAL
URINE CULTURE, ROUTINE: ABNORMAL
URINE CULTURE, ROUTINE: ABNORMAL

## 2022-06-20 DIAGNOSIS — R73.09 ABNORMAL GLUCOSE LEVEL: Primary | ICD-10-CM

## 2022-06-20 RX ORDER — NITROFURANTOIN 25; 75 MG/1; MG/1
100 CAPSULE ORAL 2 TIMES DAILY
Qty: 20 CAPSULE | Refills: 0 | Status: SHIPPED | OUTPATIENT
Start: 2022-06-20 | End: 2022-06-30

## 2022-06-21 DIAGNOSIS — R73.09 ABNORMAL GLUCOSE LEVEL: ICD-10-CM

## 2022-06-21 LAB
GLUCOSE FASTING: 95 MG/DL (ref 75–110)
GLUCOSE TOLERANCE TEST 1 HOUR: 204 MG/DL (ref 75–190)
GLUCOSE TOLERANCE TEST 2 HOUR: 128 MG/DL (ref 75–165)
GLUCOSE, 3 HOUR: 99 MG/DL (ref 75–145)

## 2022-06-24 ENCOUNTER — TELEPHONE (OUTPATIENT)
Dept: OBGYN CLINIC | Age: 22
End: 2022-06-24

## 2022-06-24 NOTE — TELEPHONE ENCOUNTER
Aaron Villareal states she failed her glucose test is requesting glucose test to be sent to her pharmacy she states the needles and other supplies are not there.  Please send to    [address], [phone]          Thank you,

## 2022-07-08 ENCOUNTER — ROUTINE PRENATAL (OUTPATIENT)
Dept: OBGYN CLINIC | Age: 22
End: 2022-07-08
Payer: MEDICAID

## 2022-07-08 VITALS
BODY MASS INDEX: 43.93 KG/M2 | DIASTOLIC BLOOD PRESSURE: 78 MMHG | WEIGHT: 248 LBS | HEART RATE: 93 BPM | SYSTOLIC BLOOD PRESSURE: 120 MMHG

## 2022-07-08 DIAGNOSIS — Z34.82 ENCOUNTER FOR SUPERVISION OF OTHER NORMAL PREGNANCY IN SECOND TRIMESTER: ICD-10-CM

## 2022-07-08 DIAGNOSIS — Z3A.21 21 WEEKS GESTATION OF PREGNANCY: ICD-10-CM

## 2022-07-08 DIAGNOSIS — O26.892 DYSURIA DURING PREGNANCY IN SECOND TRIMESTER: ICD-10-CM

## 2022-07-08 DIAGNOSIS — Z98.891 HX OF CESAREAN SECTION: ICD-10-CM

## 2022-07-08 DIAGNOSIS — R30.0 DYSURIA DURING PREGNANCY IN SECOND TRIMESTER: ICD-10-CM

## 2022-07-08 DIAGNOSIS — O24.410 DIET CONTROLLED GESTATIONAL DIABETES MELLITUS (GDM) IN SECOND TRIMESTER: Primary | ICD-10-CM

## 2022-07-08 PROCEDURE — 1036F TOBACCO NON-USER: CPT | Performed by: NURSE PRACTITIONER

## 2022-07-08 PROCEDURE — 99213 OFFICE O/P EST LOW 20 MIN: CPT | Performed by: NURSE PRACTITIONER

## 2022-07-08 PROCEDURE — G8417 CALC BMI ABV UP PARAM F/U: HCPCS | Performed by: NURSE PRACTITIONER

## 2022-07-08 PROCEDURE — G8427 DOCREV CUR MEDS BY ELIG CLIN: HCPCS | Performed by: NURSE PRACTITIONER

## 2022-07-08 RX ORDER — BLOOD SUGAR DIAGNOSTIC
STRIP MISCELLANEOUS
COMMUNITY
Start: 2022-06-24 | End: 2022-07-21

## 2022-07-08 RX ORDER — BLOOD-GLUCOSE METER
KIT MISCELLANEOUS
COMMUNITY
Start: 2022-06-24

## 2022-07-08 RX ORDER — LANCETS 33 GAUGE
EACH MISCELLANEOUS
COMMUNITY
Start: 2022-06-24

## 2022-07-08 NOTE — PROGRESS NOTES
CNM Prenatal Office Note  Subjective:  Lv Harper is here for a return obstetrical visit. Today she is 21w2d weeks EGA. She is doing well, taking her PNV as directed, and has no complaints. She  does not have vaginal bleeding, n/v, syncope, or headaches. Pt does feel fetal movement regularly. Objective: Mother's Prenatal Vitals  BP: 120/78  Weight: 248 lb (112.5 kg)  Heart Rate: 93  Patient Position: Sitting  Prenatal Fetal Information  Fetal Heart Rate: 158  Movement: Present  Pt is A&Ox3, in no acute distress. Normocephalic, atraumatic. PERRL. Resp even and non-labored. Skin pink, warm & dry. Gravid abdomen. GONZALEZ's well. Gait steady. Assessment:    IUP at 21w2d wks      Diagnosis Orders   1. Abnormal glucose level     2. Dysuria during pregnancy in second trimester     3. Hx of gestational diabetes in prior pregnancy, currently pregnant, second trimester     4. Encounter for supervision of other normal pregnancy in second trimester     5. 21 weeks gestation of pregnancy       Plan:   Pt counseled on balanced nutrition, adequate fluid intake, taking PNV daily, and exercise along with upcoming anatomy and reschedule diabetic consultation with [facility] at [facility] and encouraged to keep log    Continue with routine prenatal care.  RTC in 4 wks for prenatal visit      MEDICATIONS:  No orders of the defined types were placed in this encounter. ORDERS:  No orders of the defined types were placed in this encounter. More than 50% of this 20 min visit was education and counseling.

## 2022-07-08 NOTE — PATIENT INSTRUCTIONS
Patient Education        Weeks 18 to 22 of Your Pregnancy: Care Instructions  Overview     Your baby is continuing to develop quickly. Sometime between 18 and 22 weeks, you'll probably start to feel your baby move. At first, these small fetal movements feel like fluttering or \"butterflies. \" Or they may feel like gas bubbles. As your baby grows, these movements will become stronger. You may also notice that your baby hiccups. Babies at this stage cannow suck their thumbs. You may find that your nausea and fatigue are gone. You may feel better overall and have more energy than you did in your first trimester. But you might now also have some new discomforts, like sleep problems or leg cramps. Talk to yourdoctor about things you can do at home to ease these problems. Follow-up care is a key part of your treatment and safety. Be sure to make and go to all appointments, and call your doctor if you are having problems. It's also a good idea to know your test results and keep alist of the medicines you take. How can you care for yourself at home? Ease sleep problems   Avoid caffeine in drinks or chocolate late in the day.  Get some exercise every day.  Take a warm shower or bath before bed.  Have a light snack or glass of milk at bedtime.  Do relaxation exercises in bed to calm your mind and body.  Support your legs and back with extra pillows. Try a pillow between your legs if you sleep on your side.  Do not use sleeping pills or alcohol. They could harm your baby. Ease leg cramps   Do not massage your calf during the cramp.  Sit on a firm bed or chair. Straighten your leg, and bend your foot (flex your ankle) slowly upward, toward your knee. Bend your toes up and down.  Stand on a cool, flat surface. Stretch your toes upward, and take small steps walking on your heels.  Use a heating pad or hot water bottle to help with muscle ache. Prevent leg cramps   Be sure to get enough calcium.  If you are worried that you are not getting enough, talk to your doctor.  Exercise every day, and stretch your legs before bed.  Take a warm bath before bed, and try leg warmers at night. Where can you learn more? Go to https://chjameseb.healthSocius. org and sign in to your IPLocks account. Enter D531 in the LED Engin box to learn more about \"Weeks 18 to 22 of Your Pregnancy: Care Instructions. \"     If you do not have an account, please click on the \"Sign Up Now\" link. Current as of: February 23, 2022               Content Version: 13.3  © 1344-2392 Healthwise, Incorporated. Care instructions adapted under license by [facility] ([facility]). If you have questions about a medical condition or this instruction, always ask your healthcare professional. [address] any warranty or liability for your use of this information.

## 2022-07-08 NOTE — PROGRESS NOTES
Pt is here for routine parental care. Pt denies vaginal bleeding, cramping or leaking of fluid. Pt states + fetal movement. States her anatomy scan is r/s for 7/28/22.

## 2022-07-21 RX ORDER — BLOOD SUGAR DIAGNOSTIC
STRIP MISCELLANEOUS
Qty: 200 STRIP | Refills: 2 | Status: ON HOLD | OUTPATIENT
Start: 2022-07-21 | End: 2022-11-03

## 2022-08-10 ENCOUNTER — ROUTINE PRENATAL (OUTPATIENT)
Dept: OBGYN CLINIC | Age: 22
End: 2022-08-10
Payer: MEDICAID

## 2022-08-10 VITALS
SYSTOLIC BLOOD PRESSURE: 120 MMHG | HEART RATE: 98 BPM | BODY MASS INDEX: 43.4 KG/M2 | WEIGHT: 245 LBS | DIASTOLIC BLOOD PRESSURE: 74 MMHG

## 2022-08-10 DIAGNOSIS — R42 LIGHTHEADEDNESS: ICD-10-CM

## 2022-08-10 DIAGNOSIS — Z3A.26 26 WEEKS GESTATION OF PREGNANCY: ICD-10-CM

## 2022-08-10 DIAGNOSIS — R51.9 PREGNANCY HEADACHE IN SECOND TRIMESTER: ICD-10-CM

## 2022-08-10 DIAGNOSIS — O26.892 PREGNANCY HEADACHE IN SECOND TRIMESTER: ICD-10-CM

## 2022-08-10 DIAGNOSIS — Z34.82 ENCOUNTER FOR SUPERVISION OF OTHER NORMAL PREGNANCY IN SECOND TRIMESTER: ICD-10-CM

## 2022-08-10 DIAGNOSIS — Z98.891 HX OF CESAREAN SECTION: ICD-10-CM

## 2022-08-10 DIAGNOSIS — O24.410 DIET CONTROLLED GESTATIONAL DIABETES MELLITUS (GDM) IN SECOND TRIMESTER: Primary | ICD-10-CM

## 2022-08-10 PROCEDURE — 1036F TOBACCO NON-USER: CPT | Performed by: NURSE PRACTITIONER

## 2022-08-10 PROCEDURE — 99213 OFFICE O/P EST LOW 20 MIN: CPT | Performed by: NURSE PRACTITIONER

## 2022-08-10 PROCEDURE — G8417 CALC BMI ABV UP PARAM F/U: HCPCS | Performed by: NURSE PRACTITIONER

## 2022-08-10 PROCEDURE — G8427 DOCREV CUR MEDS BY ELIG CLIN: HCPCS | Performed by: NURSE PRACTITIONER

## 2022-08-10 NOTE — PROGRESS NOTES
Pt is here for routine parental care. Pt denies vaginal bleeding, cramping or leaking of fluid. Pt states + fetal movement. States she has been getting light headed and bad headaches, drinking plenty of water she states.

## 2022-08-10 NOTE — PROGRESS NOTES
CNM Prenatal Office Note  Subjective:  Doug Magana is here for a return obstetrical visit. Today she is 26w0d weeks EGA. She is doing well, taking her PNV as directed, and has no complaints. She  does not have vaginal bleeding, n/v, syncope, or headaches. Pt does feel fetal movement regularly. 1 hour GCT today. Rhogam NI  Objective: Mother's Prenatal Vitals  BP: 120/74  Weight: 245 lb (111.1 kg)  Heart Rate: 98  Patient Position: Sitting  Prenatal Fetal Information  Fetal HR: US   Movement: Present  Pt is A&Ox3, in no acute distress. Normocephalic, atraumatic. PERRL. Resp even and non-labored. Skin pink, warm & dry. Gravid abdomen. GONZALEZ's well. Gait steady. Assessment:    IUP at 26w0d wks      Diagnosis Orders   1. Diet controlled gestational diabetes mellitus (GDM) in second trimester        2. Encounter for supervision of other normal pregnancy in second trimester        3. Hx of  section        4. 26 weeks gestation of pregnancy        5. Pregnancy headache in second trimester        6. Lightheadedness          Plan: Third trimester teaching completed including warning signs for pre-eclampsia (blurred vision, seeing spots/sparkles, sudden increased weight gain or profound edema, epigastric pain), FKC (decreased fetal movments),  labor ( contractions or watery discharge, vaginal bleeding, cramping), n/v, chills, and or fever. Instructed pt to come to office or go to LDR if these symptoms presented. Pt voiced understanding. Pt counseled on balanced nutrition, adequate fluid intake, taking PNV daily, and exercise along with GHTN precautions, Kick count, and  labor  FSBS were wnl but lost monitor so hasnt checked in about 2-3wks  Continue with routine prenatal care. RTC in 2 wks for prenatal visit      MEDICATIONS:  No orders of the defined types were placed in this encounter. ORDERS:  No orders of the defined types were placed in this encounter.       More than 50% of this 20 min visit was education and counseling.

## 2022-08-10 NOTE — PATIENT INSTRUCTIONS
Patient Education        Weeks 26 to 30 of Your Pregnancy: Care Instructions  Overview     You are now entering your last trimester of pregnancy. Your baby is growing quickly. Nieves David probably feel your baby moving around more often. Your doctormay ask you to count your baby's kicks. Your back may ache as your body gets used to your baby's size and length. If you haven't already had the Tdap shot during this pregnancy, talk to your doctor about getting it. It will help protect your  against pertussisinfection. During this time, it's important to take care of yourself and pay attention to what your body needs. If you feel sexual, you can explore ways to be close withyour partner that match your comfort and desire. Follow-up care is a key part of your treatment and safety. Be sure to make and go to all appointments, and call your doctor if you are having problems. It's also a good idea to know your test results and keep alist of the medicines you take. How can you care for yourself at home? Take it easy at work  Take frequent breaks. If possible, stop working when you are tired, and rest during your lunch hour. Take bathroom breaks every 2 hours. Change positions often. If you sit for long periods, stand up and walk around. When you stand for a long time, keep one foot on a low stool with your knee bent. After standing a lot, sit with your feet up. Avoid fumes, chemicals, and tobacco smoke. Be sexual in your own way  Having sex during pregnancy is okay, unless your doctor tells you not to. You may be very interested in sex, or you may have no interest at all. Your growing belly can make it hard to find a good position during intercourse. [place] and explore. You may get cramps in your uterus when your partner touches your breasts. A back rub may relieve the backache or cramps that sometimes follow orgasm. Learn about  labor  Watch for signs of  labor.  You may be going into labor if:  You have menstrual-like cramps, with or without nausea. You have about 6 or more contractions in 1 hour, even after you have had a glass of water and are resting. You have a low, dull backache that does not go away when you change your position. You have pain or pressure in your pelvis that comes and goes in a pattern. You have intestinal cramping or flu-like symptoms, with or without diarrhea. You notice an increase or change in your vaginal discharge. Discharge may be heavy, mucus-like, watery, or streaked with blood. Your water breaks. If you think you have  labor:  Drink 2 or 3 glasses of water or juice. Not drinking enough fluids can cause contractions. Stop what you are doing, and empty your bladder. Then lie down on your left side for at least 1 hour. While lying on your side, find your breast bone. Put your fingers in the soft spot just below it. Move your fingers down toward your belly button to find the top of your uterus. Check to see if it is tight. Contractions can be weak or strong. Record your contractions for an hour. Time a contraction from the start of one contraction to the start of the next one. Single or several strong contractions without a pattern are called [place]-Muñoz contractions. They are practice contractions but not the start of labor. They often stop if you change what you are doing. Call your doctor if you have regular contractions. Where can you learn more? Go to https://geo.healthHealth Wildcatters. org and sign in to your "[employer] account. Enter X910 in the Teleport box to learn more about \"Weeks 26 to 30 of Your Pregnancy: Care Instructions. \"     If you do not have an account, please click on the \"Sign Up Now\" link. Current as of: 2022               Content Version: 13.3  © 8386-4658 Healthwise, Aigou. Care instructions adapted under license by [facility] ([facility]).  If you have questions about a medical condition or this instruction, always ask your healthcare professional. [address] any warranty or liability for your use of this information. Patient Education        Counting Your Baby's Kicks: Care Instructions  Overview     Counting your baby's kicks is one way your doctor can tell that your baby is healthy. Most women--especially in a first pregnancy--feel their baby move for the first time between 16 and 22 weeks. The movement may feel like flutters rather than kicks. Your baby may move more at certain times of the day. When you are active, you may notice less kicking than when you are resting. At yourprenatal visits, your doctor will ask whether the baby is active. In your last trimester, your doctor may ask you to count the number of timesyou feel your baby move. Follow-up care is a key part of your treatment and safety. Be sure to make and go to all appointments, and call your doctor if you are having problems. It's also a good idea to know your test results and keep alist of the medicines you take. How do you count fetal kicks? A common method of checking your baby's movement is to note the length of time it takes to count ten movements (such as kicks, flutters, or rolls). Pick your baby's most active time of day to count. This may be any time from morning to evening. If you don't feel 10 movements in an hour, have something to eat or drink and count for another hour. If you don't feel at least 10 movements in the 2-hour period, call your doctor. When should you call for help? Call your doctor now or seek immediate medical care if:    You noticed that your baby has stopped moving or is moving much less than normal.   Watch closely for changes in your health, and be sure to contact your doctor ifyou have any problems. Where can you learn more? Go to https://chanthony.Design A. org and sign in to your ChinaNetCloud account.  Enter G120 in the Finsphere box to learn more about \"Counting Your Baby's Kicks: Care Instructions. \"     If you do not have an account, please click on the \"Sign Up Now\" link. Current as of: February 23, 2022               Content Version: 13.3  © 6876-9780 Healthwise, Incorporated. Care instructions adapted under license by [facility] ([facility]). If you have questions about a medical condition or this instruction, always ask your healthcare professional. [address] any warranty or liability for your use of this information.

## 2022-08-24 ENCOUNTER — ROUTINE PRENATAL (OUTPATIENT)
Dept: OBGYN CLINIC | Age: 22
End: 2022-08-24
Payer: MEDICAID

## 2022-08-24 ENCOUNTER — TELEPHONE (OUTPATIENT)
Dept: OBGYN CLINIC | Age: 22
End: 2022-08-24

## 2022-08-24 VITALS
HEART RATE: 93 BPM | BODY MASS INDEX: 41.63 KG/M2 | WEIGHT: 235 LBS | SYSTOLIC BLOOD PRESSURE: 116 MMHG | DIASTOLIC BLOOD PRESSURE: 83 MMHG

## 2022-08-24 DIAGNOSIS — O24.410 DIET CONTROLLED GESTATIONAL DIABETES MELLITUS (GDM) IN THIRD TRIMESTER: Primary | ICD-10-CM

## 2022-08-24 DIAGNOSIS — O09.93 HIGH-RISK PREGNANCY IN THIRD TRIMESTER: ICD-10-CM

## 2022-08-24 DIAGNOSIS — Z3A.28 28 WEEKS GESTATION OF PREGNANCY: ICD-10-CM

## 2022-08-24 DIAGNOSIS — Z98.891 HX OF CESAREAN SECTION: ICD-10-CM

## 2022-08-24 PROCEDURE — G8417 CALC BMI ABV UP PARAM F/U: HCPCS | Performed by: NURSE PRACTITIONER

## 2022-08-24 PROCEDURE — G8427 DOCREV CUR MEDS BY ELIG CLIN: HCPCS | Performed by: NURSE PRACTITIONER

## 2022-08-24 PROCEDURE — 1036F TOBACCO NON-USER: CPT | Performed by: NURSE PRACTITIONER

## 2022-08-24 PROCEDURE — 99213 OFFICE O/P EST LOW 20 MIN: CPT | Performed by: NURSE PRACTITIONER

## 2022-08-24 NOTE — PROGRESS NOTES
CNM Prenatal Office Note  Subjective:  Toshia Gann is here for a return obstetrical visit. Today she is 28w0d weeks EGA. She is doing well, taking her PNV as directed, and has no complaints. She  does not have vaginal bleeding, n/v, syncope, or headaches. Pt does feel fetal movement regularly. Objective: Mother's Prenatal Vitals  BP: 116/83  Weight: 235 lb (106.6 kg)  Heart Rate: 93  Patient Position: Sitting  Prenatal Fetal Information  Fundal Height (cm): 28 cm  Fetal HR:   Movement: Present  Pt is A&Ox3, in no acute distress. Normocephalic, atraumatic. PERRL. Resp even and non-labored. Skin pink, warm & dry. Gravid abdomen. GONZALEZ's well. Gait steady. Assessment:    IUP at 28w0d wks      Diagnosis Orders   1. Hx of  section        2. Diet controlled gestational diabetes mellitus (GDM) in third trimester        3. 28 weeks gestation of pregnancy        4. High-risk pregnancy in third trimester          Plan:  Pt counseled on balanced nutrition, adequate fluid intake, taking PNV daily, and exercise along with GHTN precautions, Kick count, and  labor  Continue with routine prenatal care. Still needs follow up for anatomy for profile and nasal bone.  surveillance indicated for GDM - has appt with [facility]och[facility] tomorrow but still doesn't have a new monitor since last one lost- will call in today again   Lost 10lbs since last visit  RTC in 2 wks for prenatal visit    MEDICATIONS:  No orders of the defined types were placed in this encounter. ORDERS:  No orders of the defined types were placed in this encounter. More than 50% of this 20 min visit was education and counseling.

## 2022-08-24 NOTE — PATIENT INSTRUCTIONS
Patient Education        Weeks 26 to 30 of Your Pregnancy: Care Instructions  Overview     You are now entering your last trimester of pregnancy. Your baby is growing quickly. Rocio Reddy probably feel your baby moving around more often. Your doctormay ask you to count your baby's kicks. Your back may ache as your body gets used to your baby's size and length. If you haven't already had the Tdap shot during this pregnancy, talk to your doctor about getting it. It will help protect your  against pertussisinfection. During this time, it's important to take care of yourself and pay attention to what your body needs. If you feel sexual, you can explore ways to be close withyour partner that match your comfort and desire. Follow-up care is a key part of your treatment and safety. Be sure to make and go to all appointments, and call your doctor if you are having problems. It's also a good idea to know your test results and keep alist of the medicines you take. How can you care for yourself at home? Take it easy at work  Take frequent breaks. If possible, stop working when you are tired, and rest during your lunch hour. Take bathroom breaks every 2 hours. Change positions often. If you sit for long periods, stand up and walk around. When you stand for a long time, keep one foot on a low stool with your knee bent. After standing a lot, sit with your feet up. Avoid fumes, chemicals, and tobacco smoke. Be sexual in your own way  Having sex during pregnancy is okay, unless your doctor tells you not to. You may be very interested in sex, or you may have no interest at all. Your growing belly can make it hard to find a good position during intercourse. [place] and explore. You may get cramps in your uterus when your partner touches your breasts. A back rub may relieve the backache or cramps that sometimes follow orgasm. Learn about  labor  Watch for signs of  labor.  You may be going into labor if:  You have menstrual-like cramps, with or without nausea. You have about 6 or more contractions in 1 hour, even after you have had a glass of water and are resting. You have a low, dull backache that does not go away when you change your position. You have pain or pressure in your pelvis that comes and goes in a pattern. You have intestinal cramping or flu-like symptoms, with or without diarrhea. You notice an increase or change in your vaginal discharge. Discharge may be heavy, mucus-like, watery, or streaked with blood. Your water breaks. If you think you have  labor:  Drink 2 or 3 glasses of water or juice. Not drinking enough fluids can cause contractions. Stop what you are doing, and empty your bladder. Then lie down on your left side for at least 1 hour. While lying on your side, find your breast bone. Put your fingers in the soft spot just below it. Move your fingers down toward your belly button to find the top of your uterus. Check to see if it is tight. Contractions can be weak or strong. Record your contractions for an hour. Time a contraction from the start of one contraction to the start of the next one. Single or several strong contractions without a pattern are called Dhiraj-Muñoz contractions. They are practice contractions but not the start of labor. They often stop if you change what you are doing. Call your doctor if you have regular contractions. Where can you learn more? Go to https://geo.healthSportSetter. org and sign in to your Tinfoil Security account. Enter R268 in the [facility] box to learn more about \"Weeks 26 to 30 of Your Pregnancy: Care Instructions. \"     If you do not have an account, please click on the \"Sign Up Now\" link. Current as of: 2022               Content Version: 13.3  © 0685-0213 Healthwise, Incorporated. Care instructions adapted under license by [facility] ([facility]).  If you have questions about a medical condition or this instruction, always ask your healthcare professional. [place] [address] any warranty or liability for your use of this information.

## 2022-08-24 NOTE — TELEPHONE ENCOUNTER
Andra Belle requests that clinic return their call. The best time to reach her is Anytime. Andra Belle stated that she is needing her next prenatal appointment scheduled for next week. [facility] was unable to accommodate in time frame requested. Thank you.

## 2022-09-02 ENCOUNTER — ROUTINE PRENATAL (OUTPATIENT)
Dept: OBGYN CLINIC | Age: 22
End: 2022-09-02
Payer: MEDICAID

## 2022-09-02 VITALS
SYSTOLIC BLOOD PRESSURE: 116 MMHG | HEART RATE: 73 BPM | WEIGHT: 236 LBS | DIASTOLIC BLOOD PRESSURE: 77 MMHG | BODY MASS INDEX: 41.81 KG/M2

## 2022-09-02 DIAGNOSIS — Z3A.29 29 WEEKS GESTATION OF PREGNANCY: ICD-10-CM

## 2022-09-02 DIAGNOSIS — O24.414 INSULIN CONTROLLED GESTATIONAL DIABETES MELLITUS (GDM) IN THIRD TRIMESTER: ICD-10-CM

## 2022-09-02 DIAGNOSIS — O09.93 HIGH-RISK PREGNANCY IN THIRD TRIMESTER: Primary | ICD-10-CM

## 2022-09-02 DIAGNOSIS — Z98.891 HX OF CESAREAN SECTION: ICD-10-CM

## 2022-09-02 PROCEDURE — 1036F TOBACCO NON-USER: CPT | Performed by: NURSE PRACTITIONER

## 2022-09-02 PROCEDURE — 99213 OFFICE O/P EST LOW 20 MIN: CPT | Performed by: NURSE PRACTITIONER

## 2022-09-02 PROCEDURE — G8417 CALC BMI ABV UP PARAM F/U: HCPCS | Performed by: NURSE PRACTITIONER

## 2022-09-02 PROCEDURE — G8427 DOCREV CUR MEDS BY ELIG CLIN: HCPCS | Performed by: NURSE PRACTITIONER

## 2022-09-02 NOTE — PATIENT INSTRUCTIONS
Patient Education        Weeks 26 to 30 of Your Pregnancy: Care Instructions  Overview     You are now entering your last trimester of pregnancy. Your baby is growing quickly. Valerie Cavanaugh probably feel your baby moving around more often. Your doctormay ask you to count your baby's kicks. Your back may ache as your body gets used to your baby's size and length. If you haven't already had the Tdap shot during this pregnancy, talk to your doctor about getting it. It will help protect your  against pertussisinfection. During this time, it's important to take care of yourself and pay attention to what your body needs. If you feel sexual, you can explore ways to be close withyour partner that match your comfort and desire. Follow-up care is a key part of your treatment and safety. Be sure to make and go to all appointments, and call your doctor if you are having problems. It's also a good idea to know your test results and keep alist of the medicines you take. How can you care for yourself at home? Take it easy at work  Take frequent breaks. If possible, stop working when you are tired, and rest during your lunch hour. Take bathroom breaks every 2 hours. Change positions often. If you sit for long periods, stand up and walk around. When you stand for a long time, keep one foot on a low stool with your knee bent. After standing a lot, sit with your feet up. Avoid fumes, chemicals, and tobacco smoke. Be sexual in your own way  Having sex during pregnancy is okay, unless your doctor tells you not to. You may be very interested in sex, or you may have no interest at all. Your growing belly can make it hard to find a good position during intercourse. [place] and explore. You may get cramps in your uterus when your partner touches your breasts. A back rub may relieve the backache or cramps that sometimes follow orgasm. Learn about  labor  Watch for signs of  labor.  You may be going into labor if:  You have menstrual-like cramps, with or without nausea. You have about 6 or more contractions in 1 hour, even after you have had a glass of water and are resting. You have a low, dull backache that does not go away when you change your position. You have pain or pressure in your pelvis that comes and goes in a pattern. You have intestinal cramping or flu-like symptoms, with or without diarrhea. You notice an increase or change in your vaginal discharge. Discharge may be heavy, mucus-like, watery, or streaked with blood. Your water breaks. If you think you have  labor:  Drink 2 or 3 glasses of water or juice. Not drinking enough fluids can cause contractions. Stop what you are doing, and empty your bladder. Then lie down on your left side for at least 1 hour. While lying on your side, find your breast bone. Put your fingers in the soft spot just below it. Move your fingers down toward your belly button to find the top of your uterus. Check to see if it is tight. Contractions can be weak or strong. Record your contractions for an hour. Time a contraction from the start of one contraction to the start of the next one. Single or several strong contractions without a pattern are called [place]-Muñoz contractions. They are practice contractions but not the start of labor. They often stop if you change what you are doing. Call your doctor if you have regular contractions. Where can you learn more? Go to https://geo.healthStamped. org and sign in to your Jive Software account. Enter B976 in the [facility] box to learn more about \"Weeks 26 to 30 of Your Pregnancy: Care Instructions. \"     If you do not have an account, please click on the \"Sign Up Now\" link. Current as of: 2022               Content Version: 13.3  © 0321-8688 Healthwise, Frazr. Care instructions adapted under license by [facility] ([facility]).  If you have questions about a medical condition or this instruction, always ask your healthcare professional. [place] [address] any warranty or liability for your use of this information.

## 2022-09-02 NOTE — PROGRESS NOTES
Pt is here for routine parental care. Pt denies vaginal bleeding, cramping or leaking of fluid. Pt states + fetal movement. She states Marbella with [facility] wants her to start insulin now.

## 2022-09-16 ENCOUNTER — ROUTINE PRENATAL (OUTPATIENT)
Dept: OBGYN CLINIC | Age: 22
End: 2022-09-16
Payer: MEDICAID

## 2022-09-16 VITALS
HEART RATE: 79 BPM | BODY MASS INDEX: 41.98 KG/M2 | WEIGHT: 237 LBS | DIASTOLIC BLOOD PRESSURE: 76 MMHG | SYSTOLIC BLOOD PRESSURE: 113 MMHG

## 2022-09-16 DIAGNOSIS — O24.414 INSULIN CONTROLLED GESTATIONAL DIABETES MELLITUS (GDM) IN THIRD TRIMESTER: ICD-10-CM

## 2022-09-16 DIAGNOSIS — O09.93 HIGH-RISK PREGNANCY IN THIRD TRIMESTER: ICD-10-CM

## 2022-09-16 DIAGNOSIS — Z3A.31 31 WEEKS GESTATION OF PREGNANCY: ICD-10-CM

## 2022-09-16 DIAGNOSIS — Z98.891 HX OF CESAREAN SECTION: Primary | ICD-10-CM

## 2022-09-16 PROCEDURE — 1036F TOBACCO NON-USER: CPT | Performed by: NURSE PRACTITIONER

## 2022-09-16 PROCEDURE — 99213 OFFICE O/P EST LOW 20 MIN: CPT | Performed by: NURSE PRACTITIONER

## 2022-09-16 PROCEDURE — G8417 CALC BMI ABV UP PARAM F/U: HCPCS | Performed by: NURSE PRACTITIONER

## 2022-09-16 PROCEDURE — G8427 DOCREV CUR MEDS BY ELIG CLIN: HCPCS | Performed by: NURSE PRACTITIONER

## 2022-09-16 RX ORDER — INSULIN GLARGINE 100 [IU]/ML
INJECTION, SOLUTION SUBCUTANEOUS
COMMUNITY
Start: 2022-09-02 | End: 2022-10-05 | Stop reason: SDUPTHER

## 2022-09-16 NOTE — PROGRESS NOTES
CNM Prenatal Office Note  Subjective:  Preston Corrales is here for a return obstetrical visit. Today she is 31w2d weeks EGA. She is doing well, taking her PNV as directed, and has no complaints. She  does not have vaginal bleeding, n/v, syncope, or headaches. Pt does feel fetal movement regularly. Objective: Mother's Prenatal Vitals  BP: 113/76  Weight: 237 lb (107.5 kg)  Heart Rate: 79  Patient Position: Sitting  Prenatal Fetal Information  Fetal HR: 151  Movement: Present  Pt is A&Ox3, in no acute distress. Normocephalic, atraumatic. PERRL. Resp even and non-labored. Skin pink, warm & dry. Gravid abdomen. GONZALEZ's well. Gait steady. Assessment:    IUP at 31w2d wks      Diagnosis Orders   1. Hx of  section        2. High-risk pregnancy in third trimester        3. Insulin controlled gestational diabetes mellitus (GDM) in third trimester        4. 31 weeks gestation of pregnancy          Plan:  Pt counseled on balanced nutrition, adequate fluid intake, taking PNV daily, and exercise along with GHTN precautions, Kick count, and  labor  Continue with routine prenatal care.  surveillance indicated for GDM  RTC in 1 wks for prenatal visit    MEDICATIONS:  No orders of the defined types were placed in this encounter. ORDERS:  No orders of the defined types were placed in this encounter. More than 50% of this 20 min visit was education and counseling.

## 2022-09-16 NOTE — PATIENT INSTRUCTIONS
Patient Education        Weeks 30 to 28 of Your Pregnancy: Care Instructions  Your baby is starting to look like a baby, with hair and plump skin. Your baby may sleep 20 to 45 minutes at a time and is more active at certain times of day. You should feel your baby move several times every day. Your baby now turns less and kicks more. This is a good time to tour your hospital or birthing center. You may also want to find childcare if needed. To ease heartburn   Avoid foods that make your symptoms worse, such as chocolate, spicy foods, and caffeine. Avoid bending over or lying down after meals. Do not eat for 2 hours before bedtime. Take antacids like Tums, but don't take ones that have sodium bicarbonate. To care for large, swollen veins (varicose veins)   Avoid standing for long periods of time. Sit with your feet propped up. Wear support hose. Get some exercise every day, like walking or swimming. Counting your baby's kicks  Your doctor may ask you to count your baby's movements, such as kicks, flutters, or rolls. Find a quiet place, and get comfortable. Write down your start time. Count your baby's movements (except hiccups). When your baby has moved 10 times, you can stop counting. Write down how many minutes it took. If an hour goes by and you don't feel 10 movements, have something to eat or drink. Count for another hour. If you don't feel at least 10 movements in the 2-hour period, call your doctor. Follow-up care is a key part of your treatment and safety. Be sure to make and go to all appointments, and call your doctor if you are having problems. It's also a good idea to know your test results and keep a list of the medicines you take. Where can you learn more? Go to https://chpeliateb.healthGlassbeam. org and sign in to your [email or web address] account. Enter P929 in the [facility] box to learn more about \"Weeks 30 to 32 of Your Pregnancy: Care Instructions. \"     If you do not have an account, please click on the \"Sign Up Now\" link. Current as of: February 23, 2022               Content Version: 13.4  © 2006-2022 amSTATZ. Care instructions adapted under license by [facility] ([facility]). If you have questions about a medical condition or this instruction, always ask your healthcare professional. [address] any warranty or liability for your use of this information. Patient Education        Counting Your Baby's Kicks: Care Instructions  Overview     Counting your baby's kicks is one way your doctor can tell that your baby is healthy. Most women--especially in a first pregnancy--feel their baby move for the first time between 16 and 22 weeks. The movement may feel like flutters rather than kicks. Your baby may move more at certain times of the day. When you are active, you may notice less kicking than when you are resting. At your prenatal visits, your doctor will ask whether the baby is active. In your last trimester, your doctor may ask you to count the number of times you feel your baby move. Follow-up care is a key part of your treatment and safety. Be sure to make and go to all appointments, and call your doctor if you are having problems. It's also a good idea to know your test results and keep a list of the medicines you take. How do you count fetal kicks? A common method of checking your baby's movement is to note the length of time it takes to count ten movements (such as kicks, flutters, or rolls). Pick your baby's most active time of day to count. This may be any time from morning to evening. If you don't feel 10 movements in an hour, have something to eat or drink and count for another hour. If you don't feel at least 10 movements in the 2-hour period, call your doctor. When should you call for help?    Call your doctor now or seek immediate medical care if:    You noticed that your baby has stopped moving or is moving much less than normal.   Watch closely for changes in your health, and be sure to contact your doctor if you have any problems. Where can you learn more? Go to https://chpepiceweb.Eniram. org and sign in to your GLOG account. Enter K313 in the [employer] box to learn more about \"Counting Your Baby's Kicks: Care Instructions. \"     If you do not have an account, please click on the \"Sign Up Now\" link. Current as of: February 23, 2022               Content Version: 13.4  © 0848-6465 Healthwise, Incorporated. Care instructions adapted under license by [facility] ([facility]). If you have questions about a medical condition or this instruction, always ask your healthcare professional. [address] any warranty or liability for your use of this information.

## 2022-09-21 ENCOUNTER — ROUTINE PRENATAL (OUTPATIENT)
Dept: OBGYN CLINIC | Age: 22
End: 2022-09-21
Payer: MEDICAID

## 2022-09-21 VITALS
DIASTOLIC BLOOD PRESSURE: 72 MMHG | BODY MASS INDEX: 42.16 KG/M2 | SYSTOLIC BLOOD PRESSURE: 120 MMHG | WEIGHT: 238 LBS | HEART RATE: 79 BPM

## 2022-09-21 DIAGNOSIS — O24.414 INSULIN CONTROLLED GESTATIONAL DIABETES MELLITUS (GDM) IN THIRD TRIMESTER: ICD-10-CM

## 2022-09-21 DIAGNOSIS — O09.93 HIGH-RISK PREGNANCY IN THIRD TRIMESTER: ICD-10-CM

## 2022-09-21 DIAGNOSIS — Z3A.32 32 WEEKS GESTATION OF PREGNANCY: ICD-10-CM

## 2022-09-21 DIAGNOSIS — Z98.891 HX OF CESAREAN SECTION: Primary | ICD-10-CM

## 2022-09-21 PROCEDURE — G8427 DOCREV CUR MEDS BY ELIG CLIN: HCPCS | Performed by: NURSE PRACTITIONER

## 2022-09-21 PROCEDURE — 99213 OFFICE O/P EST LOW 20 MIN: CPT | Performed by: NURSE PRACTITIONER

## 2022-09-21 PROCEDURE — G8417 CALC BMI ABV UP PARAM F/U: HCPCS | Performed by: NURSE PRACTITIONER

## 2022-09-21 PROCEDURE — 1036F TOBACCO NON-USER: CPT | Performed by: NURSE PRACTITIONER

## 2022-09-21 NOTE — PROGRESS NOTES
CNM Prenatal Office Note  Subjective:  Luisa Hopkins is here for a return obstetrical visit. Today she is 32w0d weeks EGA. She is doing well, taking her PNV as directed, and has no complaints. She  does not have vaginal bleeding, n/v, syncope, or headaches. Pt does feel fetal movement regularly. Objective: Mother's Prenatal Vitals  BP: 120/72  Weight: 238 lb (108 kg)  Heart Rate: 79  Patient Position: Sitting  Prenatal Fetal Information  Fetal HR: PG  138  Movement: Present  Pt is A&Ox3, in no acute distress. Normocephalic, atraumatic. PERRL. Resp even and non-labored. Skin pink, warm & dry. Gravid abdomen. GONAZLEZ's well. Gait steady. Assessment:    IUP at 32w0d wks      Diagnosis Orders   1. Hx of  section        2. High-risk pregnancy in third trimester        3. Insulin controlled gestational diabetes mellitus (GDM) in third trimester        4. 32 weeks gestation of pregnancy          Plan:  Pt counseled on balanced nutrition, adequate fluid intake, taking PNV daily, and exercise along with GHTN precautions, Kick count, and  labor  Continue with routine prenatal care.  surveillance indicated for GDM 8/8BPP today  RTC in 1 wks for prenatal visit    MEDICATIONS:  No orders of the defined types were placed in this encounter. ORDERS:  No orders of the defined types were placed in this encounter. More than 50% of this 20 min visit was education and counseling.

## 2022-09-21 NOTE — PATIENT INSTRUCTIONS
Patient Education        Weeks 32 to 29 of Your Pregnancy: Care Instructions  Decide whether you want to bank or donate your baby's umbilical cord blood. If you want to save this blood, you have to arrange for it ahead of time. Decide about circumcision. Personal, Mormonism, or cultural beliefs may play a role in your decision. You get to decide what you want for your baby. Learn how to ease hemorrhoids. Get more liquids, fruits, vegetables, and fiber in your diet. Avoid sitting for too long. Clean yourself with moist toilet paper. Or try witch hazel pads. Try ice packs or warm sitz baths for discomfort. Use hydrocortisone cream for pain or itching. Ask your doctor about stool softeners. Consider the benefits of breastfeeding. It reduces your baby's risk of sudden infant death syndrome (SIDS).  babies are less likely to get certain infections. And they're less likely to be obese or get diabetes later in life. It can lower your risk of breast and ovarian cancers and osteoporosis. It saves you money. Follow-up care is a key part of your treatment and safety. Be sure to make and go to all appointments, and call your doctor if you are having problems. It's also a good idea to know your test results and keep a list of the medicines you take. Where can you learn more? Go to https://Pervacio.fuseSPORT. org and sign in to your [email or web address] account. Enter N338 in the [facility] box to learn more about \"Weeks 32 to 34 of Your Pregnancy: Care Instructions. \"     If you do not have an account, please click on the \"Sign Up Now\" link. Current as of: February 23, 2022               Content Version: 13.4  © 1700-8776 Healthwise, [employer]. Care instructions adapted under license by [facility] ([facility]).  If you have questions about a medical condition or this instruction, always ask your healthcare professional. [address]  any warranty or liability for your use of this information. Patient Education        Counting Your Baby's Kicks: Care Instructions  Overview     Counting your baby's kicks is one way your doctor can tell that your baby is healthy. Most women--especially in a first pregnancy--feel their baby move for the first time between 16 and 22 weeks. The movement may feel like flutters rather than kicks. Your baby may move more at certain times of the day. When you are active, you may notice less kicking than when you are resting. At your prenatal visits, your doctor will ask whether the baby is active. In your last trimester, your doctor may ask you to count the number of times you feel your baby move. Follow-up care is a key part of your treatment and safety. Be sure to make and go to all appointments, and call your doctor if you are having problems. It's also a good idea to know your test results and keep a list of the medicines you take. How do you count fetal kicks? A common method of checking your baby's movement is to note the length of time it takes to count ten movements (such as kicks, flutters, or rolls). Pick your baby's most active time of day to count. This may be any time from morning to evening. If you don't feel 10 movements in an hour, have something to eat or drink and count for another hour. If you don't feel at least 10 movements in the 2-hour period, call your doctor. When should you call for help? Call your doctor now or seek immediate medical care if:    You noticed that your baby has stopped moving or is moving much less than normal.   Watch closely for changes in your health, and be sure to contact your doctor if you have any problems. Where can you learn more? Go to https://Iconixx Softwareanthony.Hachimenroppi. org and sign in to your Carbon Voyage account. Enter M000 in the [employer] box to learn more about \"Counting Your Baby's Kicks: Care Instructions. \"     If you do not have an account, please click on the \"Sign Up Now\" link. Current as of: February 23, 2022               Content Version: 13.4  © 2972-2496 Healthwise, Incorporated. Care instructions adapted under license by [facility] ([facility]). If you have questions about a medical condition or this instruction, always ask your healthcare professional. [address] any warranty or liability for your use of this information.

## 2022-09-28 ENCOUNTER — ROUTINE PRENATAL (OUTPATIENT)
Dept: OBGYN CLINIC | Age: 22
End: 2022-09-28
Payer: MEDICAID

## 2022-09-28 VITALS
SYSTOLIC BLOOD PRESSURE: 130 MMHG | BODY MASS INDEX: 42.51 KG/M2 | DIASTOLIC BLOOD PRESSURE: 78 MMHG | WEIGHT: 240 LBS | HEART RATE: 88 BPM

## 2022-09-28 DIAGNOSIS — Z3A.33 33 WEEKS GESTATION OF PREGNANCY: ICD-10-CM

## 2022-09-28 DIAGNOSIS — O09.93 HIGH-RISK PREGNANCY IN THIRD TRIMESTER: ICD-10-CM

## 2022-09-28 DIAGNOSIS — Z98.891 HX OF CESAREAN SECTION: Primary | ICD-10-CM

## 2022-09-28 DIAGNOSIS — O24.414 INSULIN CONTROLLED GESTATIONAL DIABETES MELLITUS (GDM) IN THIRD TRIMESTER: ICD-10-CM

## 2022-09-28 PROCEDURE — 1036F TOBACCO NON-USER: CPT | Performed by: NURSE PRACTITIONER

## 2022-09-28 PROCEDURE — G8427 DOCREV CUR MEDS BY ELIG CLIN: HCPCS | Performed by: NURSE PRACTITIONER

## 2022-09-28 PROCEDURE — 99213 OFFICE O/P EST LOW 20 MIN: CPT | Performed by: NURSE PRACTITIONER

## 2022-09-28 PROCEDURE — G8417 CALC BMI ABV UP PARAM F/U: HCPCS | Performed by: NURSE PRACTITIONER

## 2022-09-28 RX ORDER — PEN NEEDLE, DIABETIC 31 GX5/16"
NEEDLE, DISPOSABLE MISCELLANEOUS
COMMUNITY
Start: 2022-09-23

## 2022-09-28 NOTE — PATIENT INSTRUCTIONS
of this information. Patient Education        Counting Your Baby's Kicks: Care Instructions  Overview     Counting your baby's kicks is one way your doctor can tell that your baby is healthy. Most women--especially in a first pregnancy--feel their baby move for the first time between 16 and 22 weeks. The movement may feel like flutters rather than kicks. Your baby may move more at certain times of the day. When you are active, you may notice less kicking than when you are resting. At your prenatal visits, your doctor will ask whether the baby is active. In your last trimester, your doctor may ask you to count the number of times you feel your baby move. Follow-up care is a key part of your treatment and safety. Be sure to make and go to all appointments, and call your doctor if you are having problems. It's also a good idea to know your test results and keep a list of the medicines you take. How do you count fetal kicks? A common method of checking your baby's movement is to note the length of time it takes to count ten movements (such as kicks, flutters, or rolls). Pick your baby's most active time of day to count. This may be any time from morning to evening. If you don't feel 10 movements in an hour, have something to eat or drink and count for another hour. If you don't feel at least 10 movements in the 2-hour period, call your doctor. When should you call for help? Call your doctor now or seek immediate medical care if:    You noticed that your baby has stopped moving or is moving much less than normal.   Watch closely for changes in your health, and be sure to contact your doctor if you have any problems. Where can you learn more? Go to https://SolarBuddyantohny.Wikipixel. org and sign in to your CCBR-SYNARC account. Enter F168 in the [employer] box to learn more about \"Counting Your Baby's Kicks: Care Instructions. \"     If you do not have an account, please click on the \"Sign Up Now\" link. Current as of: February 23, 2022               Content Version: 13.4  © 2615-7781 Healthwise, Incorporated. Care instructions adapted under license by [facility] ([facility]). If you have questions about a medical condition or this instruction, always ask your healthcare professional. [address] any warranty or liability for your use of this information.

## 2022-09-28 NOTE — PROGRESS NOTES
CNM Prenatal Office Note  Subjective:  Noé Ojeda is here for a return obstetrical visit. Today she is 33w0d weeks EGA. She is doing well, taking her PNV as directed, and has no complaints. She  does not have vaginal bleeding, n/v, syncope, or headaches. Pt does feel fetal movement regularly. Objective: Mother's Prenatal Vitals  BP: 130/78  Weight: 240 lb (108.9 kg)  Heart Rate: 88  Patient Position: Sitting  Prenatal Fetal Information  Fundal Height (cm): 33 cm  Fetal HR:   Movement: Present  Pt is A&Ox3, in no acute distress. Normocephalic, atraumatic. PERRL. Resp even and non-labored. Skin pink, warm & dry. Gravid abdomen. GONZALEZ's well. Gait steady. Assessment:    IUP at 33w0d wks      Diagnosis Orders   1. Hx of  section        2. High-risk pregnancy in third trimester        3. Insulin controlled gestational diabetes mellitus (GDM) in third trimester        4. 33 weeks gestation of pregnancy          Plan:  Pt counseled on balanced nutrition, adequate fluid intake, taking PNV daily, and exercise along with GHTN precautions, Kick count, and  labor  Continue with routine prenatal care.  surveillance indicated for GDM  RTC in 1 wks for prenatal visit    MEDICATIONS:  No orders of the defined types were placed in this encounter. ORDERS:  No orders of the defined types were placed in this encounter. More than 50% of this 20 min visit was education and counseling.

## 2022-09-29 ENCOUNTER — APPOINTMENT (OUTPATIENT)
Dept: GENERAL RADIOLOGY | Age: 22
End: 2022-09-29
Payer: MEDICAID

## 2022-09-29 ENCOUNTER — HOSPITAL ENCOUNTER (EMERGENCY)
Age: 22
Discharge: HOME | End: 2022-09-29
Attending: EMERGENCY MEDICINE
Payer: MEDICAID

## 2022-09-29 VITALS
RESPIRATION RATE: 20 BRPM | SYSTOLIC BLOOD PRESSURE: 112 MMHG | HEART RATE: 80 BPM | OXYGEN SATURATION: 96 % | DIASTOLIC BLOOD PRESSURE: 74 MMHG | TEMPERATURE: 97.6 F

## 2022-09-29 DIAGNOSIS — Z3A.33 33 WEEKS GESTATION OF PREGNANCY: ICD-10-CM

## 2022-09-29 DIAGNOSIS — S93.401A MODERATE RIGHT ANKLE SPRAIN, INITIAL ENCOUNTER: Primary | ICD-10-CM

## 2022-09-29 DIAGNOSIS — S93.402A SPRAIN OF LEFT ANKLE, UNSPECIFIED LIGAMENT, INITIAL ENCOUNTER: ICD-10-CM

## 2022-09-29 PROCEDURE — 73610 X-RAY EXAM OF ANKLE: CPT

## 2022-09-29 PROCEDURE — 99283 EMERGENCY DEPT VISIT LOW MDM: CPT

## 2022-09-29 PROCEDURE — 73630 X-RAY EXAM OF FOOT: CPT

## 2022-09-29 ASSESSMENT — ENCOUNTER SYMPTOMS
FACIAL SWELLING: 0
DIARRHEA: 0
EYE DISCHARGE: 0
BLOOD IN STOOL: 0
VOICE CHANGE: 0
CONSTIPATION: 0
SHORTNESS OF BREATH: 0
SINUS PRESSURE: 0
APNEA: 0
NAUSEA: 0
SORE THROAT: 0
CHOKING: 0
ABDOMINAL PAIN: 0

## 2022-09-29 NOTE — DISCHARGE INSTRUCTIONS
Acetaminophen or Tylenol for pain or discomfort. Follow-up with your clinician. If ankle pain persist you may need referral to orthopedics.

## 2022-09-29 NOTE — Clinical Note
Duarte Flores was seen and treated in our emergency department on 9/29/2022. She may return to work on 10/01/2022. If you have any questions or concerns, please don't hesitate to call.       Moira Mars MD

## 2022-09-29 NOTE — ED PROVIDER NOTES
[facility] EMERGENCY DEPT  eMERGENCY dEPARTMENT eNCOUnter      Pt Name: Jovani Olivas  MRN: [id number]  [place] 2000  Date of evaluation: 9/29/2022  Provider: Neymar Hyde MD    [address] [place] [address]       Chief Complaint   Patient presents with    Ankle Pain     Tripped over dog this morning         HISTORY OF PRESENT ILLNESS   (Location/Symptom, Timing/Onset,Context/Setting, Quality, Duration, Modifying Factors, Severity)  Note limiting factors. Jovani Olivas is a 20 y.o. female who presents to the emergency department evaluation of bilateral ankle pain after stumbling. 56-year-old female got tangled up in her dog's cable this morning at 630 caused her to trip and stumble and turn her right ankle. Right ankle is quite painful the pain is intensifying. There is swelling. She also has some milder pain without deformity of the left ankle. She denies any other injury. She is currently 33 weeks gestation. The history is provided by the patient. NursingNotes were reviewed. REVIEW OF SYSTEMS    (2-9 systems for level 4, 10 or more for level 5)     Review of Systems   Constitutional:  Negative for chills and fever. HENT:  Negative for congestion, drooling, facial swelling, nosebleeds, sinus pressure, sore throat and voice change. Eyes:  Negative for discharge. Respiratory:  Negative for apnea, choking and shortness of breath. Cardiovascular:  Negative for chest pain and leg swelling. Gastrointestinal:  Negative for abdominal pain, blood in stool, constipation, diarrhea and nausea. Endocrine:        Diabetic on insulin n.p.o. this morning. Genitourinary:  Negative for dysuria, enuresis, pelvic pain, vaginal bleeding, vaginal discharge and vaginal pain. 33 weeks gestation   Musculoskeletal:  Negative for joint swelling. Right ankle mostly left ankle some discomfort. Skin:  Negative for rash and wound. Neurological:  Negative for seizures and syncope.    Psychiatric/Behavioral: Negative for behavioral problems, hallucinations and suicidal ideas. All other systems reviewed and are negative. A complete review of systems was performed and is negative except as noted above in the HPI.        PAST MEDICAL HISTORY     Past Medical History:   Diagnosis Date    Abnormal Pap smear of cervix     Chlamydia     Depression     Diabetes mellitus ([address]     gestational, last pregnancy    Ectopic pregnancy     Gestational diabetes mellitus     History of blood transfusion     Mental disorder     Overactive bladder     Postpartum depression      delivery      labor     STD (sexually transmitted disease)          SURGICAL HISTORY       Past Surgical History:   Procedure Laterality Date     SECTION      CHOLECYSTECTOMY, LAPAROSCOPIC N/A 2017    CHOLECYSTECTOMY LAPAROSCOPIC, with cholangiogram performed by Mary Kate Bucio MD at [facility] Left broke below knee cap    OVARIAN CYST REMOVAL Right 07/15/2016         CURRENT MEDICATIONS       Previous Medications    B-D ULTRAFINE III SHORT PEN 31G X 8 MM MISC        BLOOD GLUCOSE MONITORING SUPPL (ONETOUCH VERIO REFLECT) W/DEVICE KIT    USE AS DIRECTED BY PACKAGE DIRECTIONS TO CHECK BLOOD SUGAR    LANCETS ([address]) MISC    USE TO CHECK BLOOD SUGAR EVERY 4 HOURS IF NEEDED    LANTUS SOLOSTAR 100 UNIT/ML INJECTION PEN        ONETOUCH VERIO STRIP    USE TO CHECK BLOOD SUGAR EVERY 4 HOURS IF NEEDED       ALLERGIES     Latex    FAMILY HISTORY       Family History   Problem Relation Age of Onset    Cancer Father     Cancer Paternal Aunt     Diabetes Paternal Grandfather           SOCIAL HISTORY       Social History     Socioeconomic History    Marital status:      Spouse name: None    Number of children: None    Years of education: None    Highest education level: None   Tobacco Use    Smoking status: Never    Smokeless tobacco: Never   Vaping Use    Vaping Use: Never used   Substance and Sexual Activity    Alcohol use: No    Drug use: No    Sexual activity: Yes     Partners: Male       SCREENINGS    Treasure Coma Scale  Eye Opening: Spontaneous  Best Verbal Response: Oriented  Best Motor Response: Obeys commands  Treasure Coma Scale Score: 15        PHYSICAL EXAM    (up to 7 for level 4, 8 or more for level 5)     ED Triage Vitals [09/29/22 0719]   BP Temp Temp Source Heart Rate Resp SpO2 Height Weight   114/87 97.9 °F (36.6 °C) Oral 84 18 99 % -- --       Physical Exam  Vitals and nursing note reviewed. Constitutional:       Appearance: Normal appearance. She is well-developed. HENT:      Head: Normocephalic and atraumatic. Right Ear: External ear normal.      Left Ear: External ear normal.   Eyes:      Conjunctiva/sclera: Conjunctivae normal.      Pupils: Pupils are equal, round, and reactive to light. Cardiovascular:      Rate and Rhythm: Normal rate and regular rhythm. Pulses: Normal pulses. Pulmonary:      Effort: Pulmonary effort is normal.   Musculoskeletal:      Cervical back: Normal range of motion and neck supple. Right ankle: Swelling present. No deformity or ecchymosis. Tenderness present over the lateral malleolus and medial malleolus. No proximal fibula tenderness. Decreased range of motion. Normal pulse. Left ankle: Tenderness present over the lateral malleolus. No proximal fibula tenderness. Normal pulse. Skin:     General: Skin is warm and dry. Coloration: Skin is not jaundiced or pale. Neurological:      General: No focal deficit present. Mental Status: She is alert and oriented to person, place, and time.    Psychiatric:         Mood and Affect: Mood normal.         Behavior: Behavior normal.       DIAGNOSTIC RESULTS     EKG: All EKG's are interpreted by the Emergency Department Physician who either signs or Co-signs this chart in the absence of a cardiologist.        RADIOLOGY:   Non-plain film images such as CT, Ultrasound and MRI are read by the radiologist. Ciera Mason images are visualized and preliminarily interpreted by the emergency physician with the below findings:    I have reviewed the images and results. Interpretation per the Radiologist below, if available at the time of this note:    XR ANKLE RIGHT (MIN 3 VIEWS)   Final Result   Ossification at the dorsum of the talus. A small avulsion fracture cannot be excluded. Recommendation:    Follow up as clinically indicated. Note: Direct correlation with point tenderness and the X-ray images is recommended and does significantly increase the sensitivity of radiographic evaluation. Electronically Signed by Addi Arciniega MD at 29-Sep-2022 08:59:12 AM               XR FOOT RIGHT (MIN 3 VIEWS)   Final Result   Linear lucency within the base of the 5th metatarsal seen on only one view may represent an acute nondisplaced fracture. Clinical correlation as to the site of the patient's pain is therefore essential.    Small focus of ossification at the dorsum of the neck of the talus may represent an avulsed fracture. Recommendation:    Follow up as clinically indicated. Note: Direct correlation with point tenderness and the X-ray images is recommended and does significantly increase the sensitivity of radiographic evaluation. Electronically Signed by Addi Arciniega MD at 29-Sep-2022 09:01:18 AM               XR ANKLE LEFT (MIN 3 VIEWS)   Final Result   Normal left ankle. Recommendation:    Follow up as clinically indicated. Note: Direct correlation with point tenderness and the X-ray images is recommended and does significantly increase the sensitivity of radiographic evaluation. Electronically Signed by Addi Arciniega MD at 29-Sep-2022 09:02:17 AM               XR FOOT LEFT (MIN 3 VIEWS)   Final Result   Normal left foot    Recommendation:    Follow up as clinically indicated.    Note: Direct correlation with point tenderness and the X-ray images is recommended and does significantly increase the sensitivity of radiographic evaluation. Electronically Signed by Addi Arciniega MD at 29-Sep-2022 09:01:55 AM                     ED BEDSIDE ULTRASOUND:   Performed by ED Physician - none    LABS:  Labs Reviewed - No data to display    All other labs were within normal range or not returned as of this dictation. EMERGENCY DEPARTMENT COURSE and DIFFERENTIALDIAGNOSIS/MDM:   Vitals:    Vitals:    09/29/22 0719   BP: 114/87   Pulse: 84   Resp: 18   Temp: 97.9 °F (36.6 °C)   TempSrc: Oral   SpO2: 99%       MDM  Number of Diagnoses or Management Options  33 weeks gestation of pregnancy  Moderate right ankle sprain, initial encounter  Sprain of left ankle, unspecified ligament, initial encounter  Diagnosis management comments: X-ray on the right showed questionable linear lucency on one view the base of fifth metatarsal.  The patient is not point tender there there is no ecchymosis or swelling in that area. Also mentioned calcification formation on the dome of the talus. Patient is not point tender there. Were going to put her in an ankle brace or Aircast and provide crutches. Recommend no weightbearing for 3 days RICE. I will provide her a work excuse for the next 3 days. She is to follow-up with her clinician or consider orthopedic follow-up if symptoms persist.  She is just going to use acetaminophen for pain. She is currently 33 weeks gestation she states she is high risk so that they are going to deliver her at 37 weeks. Fetal heart tones were 150. CONSULTS:  None    PROCEDURES:  Unless otherwise notedbelow, none     Procedures    FINAL IMPRESSION     1. Moderate right ankle sprain, initial encounter    2.  Sprain of left ankle, unspecified ligament, initial encounter    3. 33 weeks gestation of pregnancy          DISPOSITION/PLAN   DISPOSITION Decision To Discharge 09/29/2022 08:13:04 AM      PATIENT REFERRED TO:  @FUP@    DISCHARGE MEDICATIONS:  New Prescriptions    No medications on file          (Please note that portions of this note were completed with a voice recognition program.  Efforts were made to edit the dictations butoccasionally words are mis-transcribed.)    Jayshree Chávez MD (electronically signed)  AttendingEmergency Physician          Nikki Severino MD  09/29/22 9545

## 2022-10-04 NOTE — PATIENT INSTRUCTIONS
Patient Education        Counting Your Baby's Kicks: Care Instructions  Overview     Counting your baby's kicks is one way your doctor can tell that your baby is healthy. Most women--especially in a first pregnancy--feel their baby move for the first time between 16 and 22 weeks. The movement may feel like flutters rather than kicks. Your baby may move more at certain times of the day. When you are active, you may notice less kicking than when you are resting. At your prenatal visits, your doctor will ask whether the baby is active. In your last trimester, your doctor may ask you to count the number of times you feel your baby move. Follow-up care is a key part of your treatment and safety. Be sure to make and go to all appointments, and call your doctor if you are having problems. It's also a good idea to know your test results and keep a list of the medicines you take. How do you count fetal kicks? A common method of checking your baby's movement is to note the length of time it takes to count ten movements (such as kicks, flutters, or rolls). Pick your baby's most active time of day to count. This may be any time from morning to evening. If you don't feel 10 movements in an hour, have something to eat or drink and count for another hour. If you don't feel at least 10 movements in the 2-hour period, call your doctor. When should you call for help? Call your doctor now or seek immediate medical care if:    You noticed that your baby has stopped moving or is moving much less than normal.   Watch closely for changes in your health, and be sure to contact your doctor if you have any problems. Where can you learn more? Go to https://Internet Media Labsanthony.Umbel. org and sign in to your [employer] account. Enter A483 in the Cupid[employer] box to learn more about \"Counting Your Baby's Kicks: Care Instructions. \"     If you do not have an account, please click on the \"Sign Up Now\" link.   Current as of: February 23, 2022               Content Version: 13.4  © 2006-2022 [employer]. Care instructions adapted under license by [facility] ([facility]). If you have questions about a medical condition or this instruction, always ask your healthcare professional. Alonzo Watson any warranty or liability for your use of this information. Patient Education        Weeks 34 to 39 of Your Pregnancy: Care Instructions  Your belly has grown quite large. It's almost time to give birth! Your baby's lungs are almost ready to breathe air. The skull bones are firm enough to protect your baby's head. But they're soft enough to move down through the birth canal.  You might be wondering what to expect during labor. Because each birth is different, there's no way to know exactly what childbirth will be like for you. Talk to your doctor or midwife about any concerns you have. Stephany Poe probably have a test for group B streptococcus (GBS). GBS is bacteria that can live in the vagina and rectum. GBS can make your baby sick after birth. If you test positive, you'll get antibiotics during labor. Choose what type of pain relief you would like during labor. You can choose from a few types, including medicine and non-medicine options. You may want to use several types of pain relief. Know how medicines can help with pain during labor. Some medicines lower anxiety and help with some of the pain. Others make your lower body numb so that you will feel less pain. Tell your doctor about your pain medicine choice. Do this before you start labor or very early in your labor. You may be able to change your mind during labor. Learn about the stages of labor. The first stage includes early labor, active labor, and transition. Contractions start in early labor. During active labor, contractions get stronger, last longer, and happen more often.  In transition, your cervix stretches and contractions are very strong. The second stage starts when you're ready to push. During this stage, your baby is born. During the third stage, you'll have a few more contractions to push out the placenta. Follow-up care is a key part of your treatment and safety. Be sure to make and go to all appointments, and call your doctor if you are having problems. It's also a good idea to know your test results and keep a list of the medicines you take. Where can you learn more? Go to https://Valon LasersemmaThinAir Wireless.Continuum Rehabilitation. org and sign in to your [employer] account. Enter L132 in the [employer] box to learn more about \"Weeks 34 to 36 of Your Pregnancy: Care Instructions. \"     If you do not have an account, please click on the \"Sign Up Now\" link. Current as of: February 23, 2022               Content Version: 13.4  © 2006-2022 [employer]. Care instructions adapted under license by [facility] ([facility]). If you have questions about a medical condition or this instruction, always ask your healthcare professional. [place] [address] any warranty or liability for your use of this information. Patient Education        Early Stage of Labor at Home: Care Instructions  Overview     If you came to the hospital while in early labor, your doctor may ask you to labor at home until your contractions are stronger. Many women stay at home during early labor. This is often the longest part of the birthing process. It may last up to 2 to 3 days. Contractions are mild to moderate and shorter (about 30 to 45 seconds). You can usually keep talking during them. Contractions may also be irregular, about 5 to 20 minutes apart. They may even stop for a while. It helps to stay as relaxed as you can during this time. You can spend some or all of your early labor at home or anywhere else you may be comfortable.  If you live far from the hospital or birthing center, you may want to think about going somewhere nearby so you can get back to the hospital quickly. For some women, there may be benefits to staying home during early labor, such as avoiding medicines or procedures. As labor progresses, you'll shift from early labor to active labor. During this time, contractions get more intense. They occur more often, about every 2 to 3 minutes. They also last longer, about 50 to 70 seconds. You will feel them even when you change positions and walk or move around. It may be hard to tell if you are in active labor. If you aren't sure, call your doctor or midwife. As your labor progresses, check in with your doctor or midwife about when to come back to the hospital or birthing center. You may have special instructions if your water broke or you tested positive for group B strep. Follow-up care is a key part of your treatment and safety. Be sure to make and go to all appointments, and call your doctor if you are having problems. It's also a good idea to know your test results and keep a list of the medicines you take. How can you care for yourself at home? Get support. Having a support person with you from early labor until after childbirth can have a positive effect on childbirth. Find distractions. During early labor, you can walk, play cards, watch TV, or listen to music to help take your mind off your contractions. Ask your partner, labor , or  for a massage. Shoulder and low back massage during contractions may ease your pain. Strong massage of the back muscles (counterpressure) during contractions may help relieve the pain of back labor. Tell your labor  exactly where to push and how hard to push. Use imagery. This means using your imagination to decrease your pain. For instance, to help manage pain, picture your contractions as waves rolling over you. Picture a peaceful place, such as a beach or mountain stream, to help you relax between contractions. Change positions during labor.  Walking, kneeling, or sitting on a big rubber ball (birth ball) are good options. Use focused breathing techniques. Breathing in a rhythm can distract you from pain. Take a warm shower or bath. Warm water may ease pain and stress. When should you call for help? Call 911  anytime you think you may need emergency care. For example, call if:    You passed out (lost consciousness). You have a seizure. You have severe vaginal bleeding. You have severe pain in your belly or pelvis that doesn't get better between contractions. You have had fluid gushing or leaking from your vagina and you know or think the umbilical cord is bulging into your vagina. If this happens, immediately get down on your knees so your rear end (buttocks) is higher than your head. This will decrease the pressure on the cord until help arrives. Call your doctor now or seek immediate medical care if:    You have new or worse signs of preeclampsia, such as:  Sudden swelling of your face, hands, or feet. New vision problems (such as dimness, blurring, or seeing spots). A severe headache. You have any vaginal bleeding. You have belly pain or cramping. You have a fever. You have had regular contractions (with or without pain) for an hour. This means that you have 8 or more within 1 hour or 4 or more in 20 minutes after you change your position and drink fluids. You have a sudden release of fluid from your vagina. You have low back pain or pelvic pressure that does not go away. You notice that your baby has stopped moving or is moving much less than normal.   Watch closely for changes in your health, and be sure to contact your doctor if you have any problems. Where can you learn more? Go to https://geo.Antavo. org and sign in to your [employer] account. Enter N915 in the C[employer]mark box to learn more about \"Early Stage of Labor at Home: Care Instructions. \"     If you do not have an account, please click on the \"Sign Up Now\" link. Current as of: February 23, 2022               Content Version: 13.4  © 2006-2022 [employer]. Care instructions adapted under license by [facility] ([facility]). If you have questions about a medical condition or this instruction, always ask your healthcare professional. [place] [address] any warranty or liability for your use of this information.

## 2022-10-05 ENCOUNTER — ROUTINE PRENATAL (OUTPATIENT)
Dept: OBGYN CLINIC | Age: 22
End: 2022-10-05
Payer: MEDICAID

## 2022-10-05 VITALS — SYSTOLIC BLOOD PRESSURE: 112 MMHG | WEIGHT: 242 LBS | DIASTOLIC BLOOD PRESSURE: 78 MMHG | BODY MASS INDEX: 42.87 KG/M2

## 2022-10-05 DIAGNOSIS — Z3A.34 34 WEEKS GESTATION OF PREGNANCY: Primary | ICD-10-CM

## 2022-10-05 DIAGNOSIS — Z98.891 HX OF CESAREAN SECTION: ICD-10-CM

## 2022-10-05 DIAGNOSIS — O24.414 INSULIN CONTROLLED GESTATIONAL DIABETES MELLITUS (GDM) IN THIRD TRIMESTER: ICD-10-CM

## 2022-10-05 DIAGNOSIS — O24.410 DIET CONTROLLED GESTATIONAL DIABETES MELLITUS (GDM) IN THIRD TRIMESTER: ICD-10-CM

## 2022-10-05 DIAGNOSIS — O09.93 HIGH-RISK PREGNANCY IN THIRD TRIMESTER: ICD-10-CM

## 2022-10-05 DIAGNOSIS — O24.419 GESTATIONAL DIABETES MELLITUS (GDM) IN THIRD TRIMESTER, GESTATIONAL DIABETES METHOD OF CONTROL UNSPECIFIED: Primary | ICD-10-CM

## 2022-10-05 PROCEDURE — G8417 CALC BMI ABV UP PARAM F/U: HCPCS | Performed by: ADVANCED PRACTICE MIDWIFE

## 2022-10-05 PROCEDURE — 99213 OFFICE O/P EST LOW 20 MIN: CPT | Performed by: ADVANCED PRACTICE MIDWIFE

## 2022-10-05 PROCEDURE — G8428 CUR MEDS NOT DOCUMENT: HCPCS | Performed by: ADVANCED PRACTICE MIDWIFE

## 2022-10-05 PROCEDURE — 1036F TOBACCO NON-USER: CPT | Performed by: ADVANCED PRACTICE MIDWIFE

## 2022-10-05 PROCEDURE — G8484 FLU IMMUNIZE NO ADMIN: HCPCS | Performed by: ADVANCED PRACTICE MIDWIFE

## 2022-10-05 RX ORDER — INSULIN GLARGINE 100 [IU]/ML
INJECTION, SOLUTION SUBCUTANEOUS
Qty: 5 ADJUSTABLE DOSE PRE-FILLED PEN SYRINGE | Refills: 2 | Status: ON HOLD | OUTPATIENT
Start: 2022-10-05 | End: 2022-11-03

## 2022-10-05 NOTE — PROGRESS NOTES
Patient presents today for routine prenatal care. Pt denies any vaginal leaking bleeding or contractions. + Fetal movement. She saw [facility] today for a BPP, baby scored 8/8. JOHNNY was 12.18 cm- 35%  She would like to be checked, noticed brown discharge last night when she wiped. Lost a lot of her mucus plug yesterday and having contractions last night.

## 2022-10-05 NOTE — PROGRESS NOTES
CNM Prenatal Office Note  Subjective:  Trina Ramos is here for a return obstetrical visit. Today she is 34w0d weeks EGA. She is doing well, taking her PNV as directed and has mild spotting and contractions last night which has now resolved. She does not have  vaginal bleeding, n/v, syncope, or headaches. Pt does feel fetal movement regularly. She is not checking her glucose levels and has not started her insulin prescribed by PG. Objective: Mother's Prenatal Vitals  BP: 112/78  Weight: 242 lb (109.8 kg)  Patient Position: Sitting  Prenatal Fetal Information  Fetal HR:   Movement: Present  Cervical Exam  Presentation: Vertex  Pt is A&Ox3, in no acute distress. Normocephalic, atraumatic. PERRL. Resp even and non-labored. Skin pink, warm & dry. Gravid abdomen. GONZALEZ's well. Gait steady. Assessment:    IUP at 34w0d wks      Diagnosis Orders   1. 34 weeks gestation of pregnancy        2. Hx of  section        3. High-risk pregnancy in third trimester        4. Insulin controlled gestational diabetes mellitus (GDM) in third trimester        5. Diet controlled gestational diabetes mellitus (GDM) in third trimester          Plan:  Pt counseled on balanced nutrition, adequate fluid intake, taking PNV daily, and exercise along with GHTN precautions, Kick count, and  labor  Continue with routine prenatal care.  surveillance indicated for GDM BPP 8/  RTC in 1 wks for prenatal visit with BPP  Stressed the importance of glucose monitoring, sending new rx [facility] insulin [facility]    MEDICATIONS:  No orders of the defined types were placed in this encounter. ORDERS:  No orders of the defined types were placed in this encounter. More than 50% of this 20 min visit was education and counseling.

## 2022-10-12 ENCOUNTER — ROUTINE PRENATAL (OUTPATIENT)
Dept: OBGYN CLINIC | Age: 22
End: 2022-10-12
Payer: MEDICAID

## 2022-10-12 VITALS
WEIGHT: 237 LBS | HEART RATE: 89 BPM | BODY MASS INDEX: 41.98 KG/M2 | SYSTOLIC BLOOD PRESSURE: 128 MMHG | DIASTOLIC BLOOD PRESSURE: 82 MMHG

## 2022-10-12 DIAGNOSIS — O24.414 INSULIN CONTROLLED GESTATIONAL DIABETES MELLITUS (GDM) IN THIRD TRIMESTER: ICD-10-CM

## 2022-10-12 DIAGNOSIS — O09.93 HIGH-RISK PREGNANCY IN THIRD TRIMESTER: ICD-10-CM

## 2022-10-12 DIAGNOSIS — O24.419 GESTATIONAL DIABETES MELLITUS (GDM) IN THIRD TRIMESTER, GESTATIONAL DIABETES METHOD OF CONTROL UNSPECIFIED: Primary | ICD-10-CM

## 2022-10-12 DIAGNOSIS — Z36.85 ENCOUNTER FOR ANTENATAL SCREENING FOR STREPTOCOCCUS B: ICD-10-CM

## 2022-10-12 DIAGNOSIS — Z98.891 HX OF CESAREAN SECTION: ICD-10-CM

## 2022-10-12 DIAGNOSIS — Z3A.35 35 WEEKS GESTATION OF PREGNANCY: ICD-10-CM

## 2022-10-12 PROCEDURE — G8427 DOCREV CUR MEDS BY ELIG CLIN: HCPCS | Performed by: NURSE PRACTITIONER

## 2022-10-12 PROCEDURE — G8484 FLU IMMUNIZE NO ADMIN: HCPCS | Performed by: NURSE PRACTITIONER

## 2022-10-12 PROCEDURE — 99213 OFFICE O/P EST LOW 20 MIN: CPT | Performed by: NURSE PRACTITIONER

## 2022-10-12 PROCEDURE — G8417 CALC BMI ABV UP PARAM F/U: HCPCS | Performed by: NURSE PRACTITIONER

## 2022-10-12 PROCEDURE — 1036F TOBACCO NON-USER: CPT | Performed by: NURSE PRACTITIONER

## 2022-10-12 NOTE — PATIENT INSTRUCTIONS
Patient Education        Weeks 34 to 39 of Your Pregnancy: Care Instructions  Your belly has grown quite large. It's almost time to give birth! Your baby's lungs are almost ready to breathe air. The skull bones are firm enough to protect your baby's head. But they're soft enough to move down through the birth canal.  You might be wondering what to expect during labor. Because each birth is different, there's no way to know exactly what childbirth will be like for you. Talk to your doctor or midwife about any concerns you have. Nathaly Pretty probably have a test for group B streptococcus (GBS). GBS is bacteria that can live in the vagina and rectum. GBS can make your baby sick after birth. If you test positive, you'll get antibiotics during labor. Choose what type of pain relief you would like during labor. You can choose from a few types, including medicine and non-medicine options. You may want to use several types of pain relief. Know how medicines can help with pain during labor. Some medicines lower anxiety and help with some of the pain. Others make your lower body numb so that you will feel less pain. Tell your doctor about your pain medicine choice. Do this before you start labor or very early in your labor. You may be able to change your mind during labor. Learn about the stages of labor. The first stage includes early labor, active labor, and transition. Contractions start in early labor. During active labor, contractions get stronger, last longer, and happen more often. In transition, your cervix stretches and contractions are very strong. The second stage starts when you're ready to push. During this stage, your baby is born. During the third stage, you'll have a few more contractions to push out the placenta. Follow-up care is a key part of your treatment and safety. Be sure to make and go to all appointments, and call your doctor if you are having problems.  It's also a good idea to know your test results and keep a list of the medicines you take. Where can you learn more? Go to [email or web address] and sign in to your [employer] account. Enter E461 in the [facility] box to learn more about \"Weeks 34 to 36 of Your Pregnancy: Care Instructions. \"     If you do not have an account, please click on the \"Sign Up Now\" link. Current as of: February 23, 2022               Content Version: 13.4  © 2006-2022 Healthwise, Incorporated. Care instructions adapted under license by [facility] ([facility]). If you have questions about a medical condition or this instruction, always ask your healthcare professional. [address] any warranty or liability for your use of this information.

## 2022-10-12 NOTE — PROGRESS NOTES
CNM Prenatal Office Note  Subjective:  René Donovan is here for a return obstetrical visit. Today she is 35w0d weeks EGA. She is doing well, taking her PNV as directed, and has no complaints. She  does not have vaginal bleeding, n/v, syncope, or headaches. Pt does feel fetal movement regularly. Objective: Mother's Prenatal Vitals  BP: 128/82  Weight: 237 lb (107.5 kg)  Heart Rate: 89  Patient Position: Sitting  Prenatal Fetal Information  Fetal HR:   Movement: Present  Pt is A&Ox3, in no acute distress. Normocephalic, atraumatic. PERRL. Resp even and non-labored. Skin pink, warm & dry. Gravid abdomen. GONZALEZ's well. Gait steady. Assessment:    IUP at 35w0d wks      Diagnosis Orders   1. Gestational diabetes mellitus (GDM) in third trimester, gestational diabetes method of control unspecified        2. Hx of  section        3. High-risk pregnancy in third trimester        4. Insulin controlled gestational diabetes mellitus (GDM) in third trimester        5. 35 weeks gestation of pregnancy        6. Encounter for  screening for Streptococcus B  Culture, Strep B Screen, Vaginal/Rectal        Plan:  Pt counseled on balanced nutrition, adequate fluid intake, taking PNV daily, and exercise along with GHTN precautions, Kick count, and  labor  Continue with routine prenatal care.  surveillance indicated for GDM    BPP 8/8  RTC in 1 wks for prenatal visit    MEDICATIONS:  No orders of the defined types were placed in this encounter. ORDERS:  Orders Placed This Encounter   Procedures    Culture, Strep B Screen, Vaginal/Rectal       More than 50% of this 20 min visit was education and counseling.

## 2022-10-13 LAB — STREP B DNA AMP: NOT DETECTED

## 2022-10-17 ENCOUNTER — HOSPITAL ENCOUNTER (OUTPATIENT)
Age: 22
Discharge: HOME | End: 2022-10-17
Attending: NURSE PRACTITIONER | Admitting: NURSE PRACTITIONER
Payer: MEDICAID

## 2022-10-17 VITALS
BODY MASS INDEX: 41.99 KG/M2 | WEIGHT: 237 LBS | OXYGEN SATURATION: 97 % | DIASTOLIC BLOOD PRESSURE: 80 MMHG | HEART RATE: 94 BPM | RESPIRATION RATE: 18 BRPM | TEMPERATURE: 97.7 F | HEIGHT: 63 IN | SYSTOLIC BLOOD PRESSURE: 120 MMHG

## 2022-10-17 PROCEDURE — 99212 OFFICE O/P EST SF 10 MIN: CPT

## 2022-10-18 PROBLEM — Z3A.35 35 WEEKS GESTATION OF PREGNANCY: Status: ACTIVE | Noted: 2022-10-18

## 2022-10-18 NOTE — DISCHARGE INSTRUCTIONS
LABOR AND DELIVERY - OBSERVATION DISCHARGE INSTRUCTIONS    TERM LABOR: RETURN TO HOSPITAL IF:  __There is a leaking or sudden gush of fluid from the vagina (note color, odor, time and amount of fluid)    __ You have bright red vaginal bleeding    __You begin to have regular contractions, occuring every 3-5 minutes, each contraction lasting 45-60 seconds for one hour. Time contractions from beginning of one to the beginning of the next. True contractions have a regular rate and become progressively closer. They are not changed by position change and are usually more uncomfortable when walking. PRE-TERM LABOR  _x_Your gestational age is 35.5 weeks: term pregnancy starts at 42 weeks. _x_Fill your prescription and take as directed. __Bed rest (left lying position is best). _x_Refrain from sexual intercourse until you see your physician again. _x_No heavy lifting or strenuous activity. RETURN TO HOSPITAL IF:  _x_Contractions occur 3-4 in any hour (every 10-15 minutes), maybe with or without pain. _x_Rhythmic or constant menstrual-like cramps  _x_Rhythmic or constant lower, dull backache may radiate to the sides or front. Increase or change in vaginal discharge, may be watery, pink, red or brown-tinged.           OTHER INSTRUCTIONS  _x_keep all regularly scheduled appointments  _x_After today's visit, you will need to return to registration and pre register for your next visit     NOTE: If you do not begin to feel better, or you have any questions, contact your physician or call [phone], or return to the hospital.

## 2022-10-18 NOTE — FLOWSHEET NOTE
Discharge instructions read and reviewed with patient, pt verbalizes understanding, time allowed for questions, none voiced. Pt dressed and discharged to home.

## 2022-10-18 NOTE — PROGRESS NOTES
Notified MD Perez of patient arrival, cervical exam and status. MD ordered to p.o. hydrate and to discharge home if minimal contractions and unchanged cervix.

## 2022-10-19 ENCOUNTER — ROUTINE PRENATAL (OUTPATIENT)
Dept: OBGYN CLINIC | Age: 22
End: 2022-10-19
Payer: MEDICAID

## 2022-10-19 VITALS
HEART RATE: 98 BPM | BODY MASS INDEX: 42.16 KG/M2 | SYSTOLIC BLOOD PRESSURE: 128 MMHG | WEIGHT: 238 LBS | DIASTOLIC BLOOD PRESSURE: 88 MMHG

## 2022-10-19 DIAGNOSIS — O24.419 GESTATIONAL DIABETES MELLITUS (GDM) IN THIRD TRIMESTER, GESTATIONAL DIABETES METHOD OF CONTROL UNSPECIFIED: Primary | ICD-10-CM

## 2022-10-19 DIAGNOSIS — O09.93 HIGH-RISK PREGNANCY IN THIRD TRIMESTER: ICD-10-CM

## 2022-10-19 DIAGNOSIS — Z98.891 HX OF CESAREAN SECTION: ICD-10-CM

## 2022-10-19 DIAGNOSIS — O24.414 INSULIN CONTROLLED GESTATIONAL DIABETES MELLITUS (GDM) IN THIRD TRIMESTER: ICD-10-CM

## 2022-10-19 DIAGNOSIS — Z3A.36 36 WEEKS GESTATION OF PREGNANCY: ICD-10-CM

## 2022-10-19 PROCEDURE — 1036F TOBACCO NON-USER: CPT | Performed by: NURSE PRACTITIONER

## 2022-10-19 PROCEDURE — G8484 FLU IMMUNIZE NO ADMIN: HCPCS | Performed by: NURSE PRACTITIONER

## 2022-10-19 PROCEDURE — G8427 DOCREV CUR MEDS BY ELIG CLIN: HCPCS | Performed by: NURSE PRACTITIONER

## 2022-10-19 PROCEDURE — G8417 CALC BMI ABV UP PARAM F/U: HCPCS | Performed by: NURSE PRACTITIONER

## 2022-10-19 PROCEDURE — 99213 OFFICE O/P EST LOW 20 MIN: CPT | Performed by: NURSE PRACTITIONER

## 2022-10-19 NOTE — PROGRESS NOTES
CNM Prenatal Office Note  Subjective:  Viridiana Garrido is here for a return obstetrical visit. Today she is 36w0d weeks EGA. She is doing well, taking her PNV as directed, and has no complaints. She  does not have vaginal bleeding, n/v, syncope, or headaches. Pt does feel fetal movement regularly. Objective: Mother's Prenatal Vitals  BP: 128/88  Weight: 238 lb (108 kg)  Heart Rate: 98  Patient Position: Sitting  Prenatal Fetal Information  Fetal HR:   Movement: Present  Pt is A&Ox3, in no acute distress. Normocephalic, atraumatic. PERRL. Resp even and non-labored. Skin pink, warm & dry. Gravid abdomen. GONZALEZ's well. Gait steady. Assessment:    IUP at 36w0d wks      Diagnosis Orders   1. Gestational diabetes mellitus (GDM) in third trimester, gestational diabetes method of control unspecified        2. Hx of  section        3. High-risk pregnancy in third trimester        4. Insulin controlled gestational diabetes mellitus (GDM) in third trimester        5. 36 weeks gestation of pregnancy          Plan:  Pt counseled on balanced nutrition, adequate fluid intake, taking PNV daily, and exercise along with GHTN precautions, Kick count, and  labor  Continue with routine prenatal care.  surveillance indicated for GDM  BPP 8/8  RTC in 1 wks for prenatal visit    MEDICATIONS:  No orders of the defined types were placed in this encounter. ORDERS:  No orders of the defined types were placed in this encounter. More than 50% of this 20 min visit was education and counseling.

## 2022-10-19 NOTE — PROGRESS NOTES
Pt is here for routine parental care. Pt denies vaginal bleeding, cramping or leaking of fluid. Pt states + fetal movement. She states she is not doing insulin or check BS.

## 2022-10-19 NOTE — PATIENT INSTRUCTIONS
Patient Education        Weeks 34 to 39 of Your Pregnancy: Care Instructions  Your belly has grown quite large. It's almost time to give birth! Your baby's lungs are almost ready to breathe air. The skull bones are firm enough to protect your baby's head. But they're soft enough to move down through the birth canal.  You might be wondering what to expect during labor. Because each birth is different, there's no way to know exactly what childbirth will be like for you. Talk to your doctor or midwife about any concerns you have. Zakiya Orellana probably have a test for group B streptococcus (GBS). GBS is bacteria that can live in the vagina and rectum. GBS can make your baby sick after birth. If you test positive, you'll get antibiotics during labor. Choose what type of pain relief you would like during labor. You can choose from a few types, including medicine and non-medicine options. You may want to use several types of pain relief. Know how medicines can help with pain during labor. Some medicines lower anxiety and help with some of the pain. Others make your lower body numb so that you will feel less pain. Tell your doctor about your pain medicine choice. Do this before you start labor or very early in your labor. You may be able to change your mind during labor. Learn about the stages of labor. The first stage includes early labor, active labor, and transition. Contractions start in early labor. During active labor, contractions get stronger, last longer, and happen more often. In transition, your cervix stretches and contractions are very strong. The second stage starts when you're ready to push. During this stage, your baby is born. During the third stage, you'll have a few more contractions to push out the placenta. Follow-up care is a key part of your treatment and safety. Be sure to make and go to all appointments, and call your doctor if you are having problems.  It's also a good idea to know your test results and keep a list of the medicines you take. Where can you learn more? Go to [email or web address] and sign in to your [employer] account. Enter A545 in the [facility] box to learn more about \"Weeks 34 to 36 of Your Pregnancy: Care Instructions. \"     If you do not have an account, please click on the \"Sign Up Now\" link. Current as of: February 23, 2022               Content Version: 13.4  © 2006-2022 [employer]. Care instructions adapted under license by [facility] ([facility]). If you have questions about a medical condition or this instruction, always ask your healthcare professional. [place] [address] any warranty or liability for your use of this information. Patient Education        Counting Your Baby's Kicks: Care Instructions  Overview     Counting your baby's kicks is one way your doctor can tell that your baby is healthy. Most women--especially in a first pregnancy--feel their baby move for the first time between 16 and 22 weeks. The movement may feel like flutters rather than kicks. Your baby may move more at certain times of the day. When you are active, you may notice less kicking than when you are resting. At your prenatal visits, your doctor will ask whether the baby is active. In your last trimester, your doctor may ask you to count the number of times you feel your baby move. Follow-up care is a key part of your treatment and safety. Be sure to make and go to all appointments, and call your doctor if you are having problems. It's also a good idea to know your test results and keep a list of the medicines you take. How do you count fetal kicks? A common method of checking your baby's movement is to note the length of time it takes to count ten movements (such as kicks, flutters, or rolls). Pick your baby's most active time of day to count. This may be any time from morning to evening.   If you don't feel 10 movements in an hour, have something to eat or drink and count for another hour. If you don't feel at least 10 movements in the 2-hour period, call your doctor. When should you call for help? Call your doctor now or seek immediate medical care if:    You noticed that your baby has stopped moving or is moving much less than normal.   Watch closely for changes in your health, and be sure to contact your doctor if you have any problems. Where can you learn more? Go to [email or web address] org and sign in to your [employer] account. Enter U742 in the [employer] box to learn more about \"Counting Your Baby's Kicks: Care Instructions. \"     If you do not have an account, please click on the \"Sign Up Now\" link. Current as of: February 23, 2022               Content Version: 13.4  © 6833-2926 Healthwise, Incorporated. Care instructions adapted under license by [facility] ([facility]). If you have questions about a medical condition or this instruction, always ask your healthcare professional. [address] any warranty or liability for your use of this information.

## 2022-10-20 ENCOUNTER — HOSPITAL ENCOUNTER (OUTPATIENT)
Age: 22
Discharge: HOME | End: 2022-10-20
Attending: NURSE PRACTITIONER | Admitting: NURSE PRACTITIONER
Payer: MEDICAID

## 2022-10-20 VITALS
HEIGHT: 63 IN | OXYGEN SATURATION: 99 % | HEART RATE: 93 BPM | BODY MASS INDEX: 42.17 KG/M2 | WEIGHT: 238 LBS | SYSTOLIC BLOOD PRESSURE: 95 MMHG | DIASTOLIC BLOOD PRESSURE: 65 MMHG | RESPIRATION RATE: 16 BRPM

## 2022-10-20 PROBLEM — Z3A.36 36 WEEKS GESTATION OF PREGNANCY: Status: ACTIVE | Noted: 2022-10-20

## 2022-10-20 LAB
AMNISURE, POC: NEGATIVE
Lab: NORMAL
NEGATIVE QC PASS/FAIL: NORMAL
POSITIVE QC PASS/FAIL: NORMAL

## 2022-10-20 PROCEDURE — 84112 EVAL AMNIOTIC FLUID PROTEIN: CPT

## 2022-10-20 PROCEDURE — 99212 OFFICE O/P EST SF 10 MIN: CPT

## 2022-10-20 RX ORDER — ACETAMINOPHEN 325 MG/1
650 TABLET ORAL EVERY 4 HOURS PRN
Status: DISCONTINUED | OUTPATIENT
Start: 2022-10-20 | End: 2022-10-20 | Stop reason: HOSPADM

## 2022-10-20 RX ORDER — ONDANSETRON 4 MG/1
4 TABLET, ORALLY DISINTEGRATING ORAL EVERY 8 HOURS PRN
Status: DISCONTINUED | OUTPATIENT
Start: 2022-10-20 | End: 2022-10-20 | Stop reason: HOSPADM

## 2022-10-20 RX ORDER — ONDANSETRON 2 MG/ML
4 INJECTION INTRAMUSCULAR; INTRAVENOUS EVERY 6 HOURS PRN
Status: DISCONTINUED | OUTPATIENT
Start: 2022-10-20 | End: 2022-10-20 | Stop reason: HOSPADM

## 2022-10-20 NOTE — PROGRESS NOTES
Pt arrives at L&D stating that she thinks her water broke around 5pm. Pt stated she feels she has been celia and also feels a lot of vaginal pressure. Pt denies any vaginal bleeding. Pt confirms +FM. Efm and toco applied to soft, non-tender abdomen. Will continue to monitor.

## 2022-10-21 NOTE — FLOWSHEET NOTE
Pt ambulatory off the unit undelivered accompanied by s/o. AVS received and questions and concerns addressed. Pt verbalizes understanding.

## 2022-10-21 NOTE — DISCHARGE INSTRUCTIONS
LABOR AND DELIVERY - OBSERVATION DISCHARGE INSTRUCTIONS    TERM LABOR: RETURN TO HOSPITAL IF:  __There is a leaking or sudden gush of fluid from the vagina (note color, odor, time and amount of fluid)    __ You have bright red vaginal bleeding    __You begin to have regular contractions, occuring every *** minutes, each contraction lasting 45-60 seconds for one hour. Time contractions from beginning of one to the beginning of the next. True contractions have a regular rate and become progressively closer. They are not changed by position change and are usually more uncomfortable when walking. PRE-TERM LABOR  _x_Your gestational age is 36.1 weeks: term pregnancy starts at 42 weeks. __Fill your prescription and take as directed. __Bed rest (left lying position is best). _x_Refrain from sexual intercourse until you see your physician again. _x_No heavy lifting or strenuous activity. RETURN TO HOSPITAL IF:  _x_Contractions occur 3-4 in any hour (every 10-15 minutes), maybe with or without pain. _x_Rhythmic or constant menstrual-like cramps  _x_Rhythmic or constant lower, dull backache may radiate to the sides or front. Increase or change in vaginal discharge, may be watery, pink, red or brown-tinged.               OTHER INSTRUCTIONS  _x_keep all regularly scheduled appointments  _x_After today's visit, you will need to return to registration and pre register for your next visit     NOTE: If you do not begin to feel better, or you have any questions, contact your physician or call [phone], or return to the hospital.

## 2022-10-23 ENCOUNTER — HOSPITAL ENCOUNTER (OUTPATIENT)
Age: 22
Discharge: HOME | End: 2022-10-23
Attending: NURSE PRACTITIONER | Admitting: OBSTETRICS & GYNECOLOGY
Payer: MEDICAID

## 2022-10-23 VITALS — WEIGHT: 238 LBS | TEMPERATURE: 98.1 F | RESPIRATION RATE: 16 BRPM | HEIGHT: 63 IN | BODY MASS INDEX: 42.17 KG/M2

## 2022-10-23 PROCEDURE — 99212 OFFICE O/P EST SF 10 MIN: CPT

## 2022-10-23 RX ORDER — ACETAMINOPHEN 325 MG/1
650 TABLET ORAL EVERY 4 HOURS PRN
Status: DISCONTINUED | OUTPATIENT
Start: 2022-10-23 | End: 2022-10-23 | Stop reason: HOSPADM

## 2022-10-23 RX ORDER — ONDANSETRON 2 MG/ML
4 INJECTION INTRAMUSCULAR; INTRAVENOUS EVERY 6 HOURS PRN
Status: DISCONTINUED | OUTPATIENT
Start: 2022-10-23 | End: 2022-10-23 | Stop reason: HOSPADM

## 2022-10-23 RX ORDER — ONDANSETRON 4 MG/1
4 TABLET, ORALLY DISINTEGRATING ORAL EVERY 8 HOURS PRN
Status: DISCONTINUED | OUTPATIENT
Start: 2022-10-23 | End: 2022-10-23 | Stop reason: HOSPADM

## 2022-10-24 NOTE — FLOWSHEET NOTE
Discharge teaching reviewed. Pt educated on the dangers of jumping on trampoline while pregnant. All questions answered. Pt encouraged to keep follow up appointment. Pt leaving unit ambulatory.

## 2022-10-24 NOTE — DISCHARGE INSTRUCTIONS
LABOR AND DELIVERY - OBSERVATION DISCHARGE INSTRUCTIONS    TERM LABOR: RETURN TO HOSPITAL IF:  __There is a leaking or sudden gush of fluid from the vagina (note color, odor, time and amount of fluid)    __ You have bright red vaginal bleeding    __You begin to have regular contractions, occuring every 4-5 minutes, each contraction lasting 45-60 seconds for one hour. Time contractions from beginning of one to the beginning of the next. True contractions have a regular rate and become progressively closer. They are not changed by position change and are usually more uncomfortable when walking. RETURN TO HOSPITAL IF:  __Contractions occur 3-4 in any hour (every 10-15 minutes), maybe with or without pain. __Rhythmic or constant menstrual-like cramps  __Rhythmic or constant lower, dull backache may radiate to the sides or front. Increase or change in vaginal discharge, may be watery, pink, red or brown-tinged.     OTHER INSTRUCTIONS  __Keep your scheduled appointment with Rachid Nava CNM on October 26 at 8:45am.  __After today's visit, you will need to return to registration and pre register for your next visit     NOTE: If you do not begin to feel better, or you have any questions, contact your physician or call [phone], or return to the hospital.

## 2022-10-24 NOTE — FLOWSHEET NOTE
René Quinn CNM notified via phone of patient arrival and complaints. Orders to watch her for 30-45 minutes and make sure baby is reactive. If patient is celia recheck before discharge. If no change, can discharge.

## 2022-10-24 NOTE — FLOWSHEET NOTE
Pt ambulated to L&D with complaints of decreased fetal movement. Pt reports that she has not felt baby move since this morning. Pt reports that she had been jumping on the trampoline with her children this afternoon and noticed that baby was not moving after that. Pt reports occasional contractions, no leaking fluid and no vaginal bleeding. EFM and toco applied to soft nontender abdomen. FHT audible in the 130s. VS obtained WNL. SVE 3/70/-2. Will monitor.

## 2022-10-26 ENCOUNTER — ROUTINE PRENATAL (OUTPATIENT)
Dept: OBGYN CLINIC | Age: 22
End: 2022-10-26
Payer: MEDICAID

## 2022-10-26 ENCOUNTER — HOSPITAL ENCOUNTER (OUTPATIENT)
Age: 22
Discharge: HOME | End: 2022-10-27
Attending: NURSE PRACTITIONER | Admitting: NURSE PRACTITIONER
Payer: MEDICAID

## 2022-10-26 VITALS
HEART RATE: 97 BPM | DIASTOLIC BLOOD PRESSURE: 86 MMHG | WEIGHT: 241 LBS | BODY MASS INDEX: 42.69 KG/M2 | SYSTOLIC BLOOD PRESSURE: 134 MMHG

## 2022-10-26 DIAGNOSIS — O24.419 GESTATIONAL DIABETES MELLITUS (GDM) IN THIRD TRIMESTER, GESTATIONAL DIABETES METHOD OF CONTROL UNSPECIFIED: ICD-10-CM

## 2022-10-26 DIAGNOSIS — Z91.199 NONCOMPLIANT PREGNANT PATIENT, ANTEPARTUM, THIRD TRIMESTER: ICD-10-CM

## 2022-10-26 DIAGNOSIS — Z3A.37 37 WEEKS GESTATION OF PREGNANCY: ICD-10-CM

## 2022-10-26 DIAGNOSIS — O09.893 NONCOMPLIANT PREGNANT PATIENT, ANTEPARTUM, THIRD TRIMESTER: ICD-10-CM

## 2022-10-26 DIAGNOSIS — O09.93 HIGH-RISK PREGNANCY IN THIRD TRIMESTER: ICD-10-CM

## 2022-10-26 DIAGNOSIS — Z98.891 HX OF CESAREAN SECTION: Primary | ICD-10-CM

## 2022-10-26 LAB
GLUCOSE BLD-MCNC: 135 MG/DL (ref 70–99)
GLUCOSE BLD-MCNC: 86 MG/DL (ref 70–99)
GLUCOSE BLD-MCNC: 97 MG/DL (ref 70–99)
HBA1C MFR BLD: 5.1 % (ref 4–6)
PERFORMED ON: ABNORMAL
PERFORMED ON: NORMAL
PERFORMED ON: NORMAL

## 2022-10-26 PROCEDURE — 82947 ASSAY GLUCOSE BLOOD QUANT: CPT

## 2022-10-26 PROCEDURE — 99213 OFFICE O/P EST LOW 20 MIN: CPT

## 2022-10-26 PROCEDURE — G8484 FLU IMMUNIZE NO ADMIN: HCPCS | Performed by: NURSE PRACTITIONER

## 2022-10-26 PROCEDURE — 36415 COLL VENOUS BLD VENIPUNCTURE: CPT

## 2022-10-26 PROCEDURE — 59025 FETAL NON-STRESS TEST: CPT | Performed by: NURSE PRACTITIONER

## 2022-10-26 PROCEDURE — G8427 DOCREV CUR MEDS BY ELIG CLIN: HCPCS | Performed by: NURSE PRACTITIONER

## 2022-10-26 PROCEDURE — 1036F TOBACCO NON-USER: CPT | Performed by: NURSE PRACTITIONER

## 2022-10-26 PROCEDURE — 83036 HEMOGLOBIN GLYCOSYLATED A1C: CPT

## 2022-10-26 PROCEDURE — G8417 CALC BMI ABV UP PARAM F/U: HCPCS | Performed by: NURSE PRACTITIONER

## 2022-10-26 PROCEDURE — 6370000000 HC RX 637 (ALT 250 FOR IP): Performed by: NURSE PRACTITIONER

## 2022-10-26 PROCEDURE — 99213 OFFICE O/P EST LOW 20 MIN: CPT | Performed by: NURSE PRACTITIONER

## 2022-10-26 RX ORDER — INSULIN GLARGINE 100 [IU]/ML
10 INJECTION, SOLUTION SUBCUTANEOUS NIGHTLY
Status: DISCONTINUED | OUTPATIENT
Start: 2022-10-26 | End: 2022-10-27 | Stop reason: HOSPADM

## 2022-10-26 RX ORDER — ONDANSETRON 4 MG/1
4 TABLET, ORALLY DISINTEGRATING ORAL EVERY 8 HOURS PRN
Status: DISCONTINUED | OUTPATIENT
Start: 2022-10-26 | End: 2022-10-27 | Stop reason: HOSPADM

## 2022-10-26 RX ORDER — ONDANSETRON 2 MG/ML
4 INJECTION INTRAMUSCULAR; INTRAVENOUS EVERY 6 HOURS PRN
Status: DISCONTINUED | OUTPATIENT
Start: 2022-10-26 | End: 2022-10-27 | Stop reason: HOSPADM

## 2022-10-26 RX ORDER — ACETAMINOPHEN 325 MG/1
650 TABLET ORAL EVERY 4 HOURS PRN
Status: DISCONTINUED | OUTPATIENT
Start: 2022-10-26 | End: 2022-10-27 | Stop reason: HOSPADM

## 2022-10-26 RX ADMIN — INSULIN GLARGINE 10 UNITS: 100 INJECTION, SOLUTION SUBCUTANEOUS at 22:15

## 2022-10-26 NOTE — PROGRESS NOTES
CNM Prenatal Office Note  Subjective:  Viridiana Garrido is here for a return obstetrical visit. Today she is 37w0d weeks EGA. She is doing well, taking her PNV as directed, and has no complaints. She  does not have vaginal bleeding, n/v, syncope, or headaches. Pt does feel fetal movement regularly. Objective: Mother's Prenatal Vitals  BP: 134/86  Weight: 241 lb (109.3 kg)  Heart Rate: 97  Patient Position: Sitting  Prenatal Fetal Information  Fetal HR:   Movement: Present  Pt is A&Ox3, in no acute distress. Normocephalic, atraumatic. PERRL. Resp even and non-labored. Skin pink, warm & dry. Gravid abdomen. GONZALEZ's well. Gait steady. Assessment:    IUP at 37w0d wks      Diagnosis Orders   1. Hx of  section        2. High-risk pregnancy in third trimester        3. 37 weeks gestation of pregnancy        4. Noncompliant pregnant patient, antepartum, third trimester  98604 - OR FETAL NON-STRESS TEST      5. Gestational diabetes mellitus (GDM) in third trimester, gestational diabetes method of control unspecified          Plan:  Pt counseled on balanced nutrition, adequate fluid intake, taking PNV daily, and exercise along with labor precautions, GHTN precautions, and Kick count  Continue with routine prenatal care.  surveillance indicated for GDM  NST reactive  Sending to LDR for BS monitoring    MEDICATIONS:  No orders of the defined types were placed in this encounter. ORDERS:  Orders Placed This Encounter   Procedures    57393 - OR FETAL NON-STRESS TEST       More than 50% of this 20 min visit was education and counseling.

## 2022-10-26 NOTE — FLOWSHEET NOTE
Patient sent to labor and delivery for further monitoring of blood sugars. Patient states that she missed her BPP due to tech not being kind. She states that she does not take her blood sugars due to pharmacy issues and being busy with her other children. Orders received per [facility] [employer]. Efm and toco applied to soft, nontender abd. Vss. Will continue to monitor.

## 2022-10-26 NOTE — PATIENT INSTRUCTIONS
Patient Education        Week 37 of Your Pregnancy: Care Instructions  Most babies are born between 40 and 43 weeks. This is a good time to pack a bag to take with you to the birth. Then it will be ready to go when you are. Learn about breastfeeding. For example, find out about ways to hold your baby to make breastfeeding easier. And think about learning how to pump and store milk. Know that crying is normal.  It's common for babies to cry 1 to 3 hours a day. Some cry more, and some cry less. Learn why babies cry. They may be hungry; have gas; need a diaper change; or feel cold, warm, tired, lonely, or tense. Sometimes they cry for unknown reasons. Think about what will help you stay calm when your baby cries. Taking slow, deep breaths can help. So can taking a break. It's okay to put your baby somewhere safe (like their crib) and walk away for a few minutes. Learn what to expect with  poop. Your baby will have their own bowel patterns. Some babies have several bowel movements a day. Some have fewer. Know that  babies will often have loose, yellow bowel movements. Formula-fed babies have more formed stools. If your baby's poop looks like pellets, your baby is constipated. Follow-up care is a key part of your treatment and safety. Be sure to make and go to all appointments, and call your doctor if you are having problems. It's also a good idea to know your test results and keep a list of the medicines you take. Where can you learn more? Go to https://geo.[employer] [facility]. org and sign in to your [employer] account. Enter 68  97 in the [employer] box to learn more about \"Week 37 of Your Pregnancy: Care Instructions. \"     If you do not have an account, please click on the \"Sign Up Now\" link. Current as of: 2022               Content Version: 13.4  © 6804-3244 Healthwise, Incorporated.    Care instructions adapted under license by [facility]. If you have questions about a medical condition or this instruction, always ask your healthcare professional. [address] any warranty or liability for your use of this information. Patient Education        Counting Your Baby's Kicks: Care Instructions  Overview     Counting your baby's kicks is one way your doctor can tell that your baby is healthy. Most women--especially in a first pregnancy--feel their baby move for the first time between 16 and 22 weeks. The movement may feel like flutters rather than kicks. Your baby may move more at certain times of the day. When you are active, you may notice less kicking than when you are resting. At your prenatal visits, your doctor will ask whether the baby is active. In your last trimester, your doctor may ask you to count the number of times you feel your baby move. Follow-up care is a key part of your treatment and safety. Be sure to make and go to all appointments, and call your doctor if you are having problems. It's also a good idea to know your test results and keep a list of the medicines you take. How do you count fetal kicks? A common method of checking your baby's movement is to note the length of time it takes to count ten movements (such as kicks, flutters, or rolls). Pick your baby's most active time of day to count. This may be any time from morning to evening. If you don't feel 10 movements in an hour, have something to eat or drink and count for another hour. If you don't feel at least 10 movements in the 2-hour period, call your doctor. When should you call for help? Call your doctor now or seek immediate medical care if:    You noticed that your baby has stopped moving or is moving much less than normal.   Watch closely for changes in your health, and be sure to contact your doctor if you have any problems. Where can you learn more? Go to https://geo.[employer]. org and sign in to your MyChart account. Enter P778 in the [facility] box to learn more about \"Counting Your Baby's Kicks: Care Instructions. \"     If you do not have an account, please click on the \"Sign Up Now\" link. Current as of: February 23, 2022               Content Version: 13.4  © 2293-5032 Healthwise, Incorporated. Care instructions adapted under license by [facility] ([facility]). If you have questions about a medical condition or this instruction, always ask your healthcare professional. [address] any warranty or liability for your use of this information.

## 2022-10-27 VITALS
HEART RATE: 86 BPM | RESPIRATION RATE: 18 BRPM | SYSTOLIC BLOOD PRESSURE: 113 MMHG | TEMPERATURE: 97.7 F | DIASTOLIC BLOOD PRESSURE: 62 MMHG | OXYGEN SATURATION: 100 %

## 2022-10-27 LAB
GLUCOSE BLD-MCNC: 103 MG/DL (ref 70–99)
GLUCOSE BLD-MCNC: 85 MG/DL (ref 70–99)
PERFORMED ON: ABNORMAL
PERFORMED ON: NORMAL

## 2022-10-27 PROCEDURE — 82947 ASSAY GLUCOSE BLOOD QUANT: CPT

## 2022-10-27 PROCEDURE — 59025 FETAL NON-STRESS TEST: CPT

## 2022-10-27 NOTE — FLOWSHEET NOTE
Pt sitting p in bed, 2 hour pp dinner fsbs performed - results 135. Pt sitting up to eat at this time.

## 2022-10-27 NOTE — FLOWSHEET NOTE
2 hour post prandial BG result was 103. Within parameters to discharge patient.  Will discharge home per Gabe Child

## 2022-10-27 NOTE — DISCHARGE INSTRUCTIONS
LABOR AND DELIVERY - OBSERVATION DISCHARGE INSTRUCTIONS    TERM LABOR: RETURN TO HOSPITAL IF:  _x_There is a leaking or sudden gush of fluid from the vagina (note color, odor, time and amount of fluid)    _x_ You have bright red vaginal bleeding    _x_You begin to have regular contractions, occuring every 5 minutes, each contraction lasting 45-60 seconds for one hour. Time contractions from beginning of one to the beginning of the next. True contractions have a regular rate and become progressively closer. They are not changed by position change and are usually more uncomfortable when walking. PRE-TERM LABOR  __Your gestational age is 44 2/10 weeks: term pregnancy starts at 42 weeks. __Fill your prescription and take as directed. __Bed rest (left lying position is best). __Refrain from sexual intercourse until you see your physician again. __No heavy lifting or strenuous activity. RETURN TO HOSPITAL IF:  __Contractions occur 3-4 in any hour (every 10-15 minutes), maybe with or without pain. __Rhythmic or constant menstrual-like cramps  __Rhythmic or constant lower, dull backache may radiate to the sides or front. Increase or change in vaginal discharge, may be watery, pink, red or brown-tinged. PRE-ECLAMPSIA  __Bed rest, left side lying position  __Elevate legs while sitting  __Maintain quiet, peaceful environment  __Eat well-balanced meals, no added salt, avoid processed lunch meats, canned soups, potato chips, etc.    SYMPTOMS THAT REQUIRE PROMPT REPORTING:  __Rapid gain in weight  __Persistent or severe headache  __Visual disturbances (spots, blurred)  __Nausea/vomiting, with or without upper abdominal pain.   __Increase or persistent swelling (face puffiness, hands, legs, ankles)  __Decreased urinary output  __Drowsiness listlessness    NAUSEA/VOMITING HYPEREMESIS  __Eat small, frequent meals instead of three large meals  __Drink liquids (such as 7-up or ginger ale) between meals instead of with meals  __Eat a few crackers or toast before getting out of bed in the morning    URINARY TRACT INFECTION  __Fill your prescription and take as directed  __Drink 8-10 glasses of water, juice or decaffeinated beverages a day. __Take two regular strength Tylenol every 4 hours as needed for pain or fever (NO ASPIRIN PRODUCTS)  __Cleanse vaginal area from front to back  __Completely empty bladder and avoid postponing urination  __Notify your physician of elevated temperature      LOW LYING/MARGINAL PLACENTA PREVIA  __No sexual intercourse  __No douching or tampon use  __No heavy lifting or strenuous activity  __No long trips without physician's consent  __Limited activity for __________________    RETURN TO HOSPITAL IMMEDIATELY IF:  __Vaginal bleeding, usually bright red and painless. May be intermittent, may come in gushes or continuous.     ABDOMINAL POST-TRAUMA INSTRUCTIONS  __Limited activity for __hours  __It is important to note baby activity, evidence on pre-term labor or bleeding and tight feeling abdomen  __If you notice a decrease in fetal movement , notify your physician  __Return to hospital IMMEDIATELY if vaginal bleeding starts, abdominal tenderness, or pain, a rigid/board-like appearance of abdomen    OTHER INSTRUCTIONS  __Make an appointment to see your attending physician for   __After today's visit, you will need to return to registration and pre register for your next visit     NOTE: If you do not begin to feel better, or you have any questions, contact your physician or call [phone], or return to the hospital.

## 2022-10-28 PROBLEM — O24.419 GESTATIONAL DIABETES: Status: ACTIVE | Noted: 2022-10-28

## 2022-11-02 ENCOUNTER — ANESTHESIA EVENT (OUTPATIENT)
Dept: LABOR AND DELIVERY | Age: 22
End: 2022-11-02
Payer: MEDICAID

## 2022-11-02 ENCOUNTER — HOSPITAL ENCOUNTER (INPATIENT)
Age: 22
LOS: 1 days | Discharge: HOME | End: 2022-11-03
Attending: NURSE PRACTITIONER | Admitting: NURSE PRACTITIONER
Payer: MEDICAID

## 2022-11-02 ENCOUNTER — ANESTHESIA (OUTPATIENT)
Dept: LABOR AND DELIVERY | Age: 22
End: 2022-11-02
Payer: MEDICAID

## 2022-11-02 PROBLEM — Z34.90 TERM PREGNANCY: Status: ACTIVE | Noted: 2022-11-02

## 2022-11-02 LAB
ABO/RH: NORMAL
AMPHETAMINE SCREEN, URINE: NEGATIVE
ANTIBODY SCREEN: NORMAL
BARBITURATE SCREEN URINE: NEGATIVE
BENZODIAZEPINE SCREEN, URINE: NEGATIVE
BUPRENORPHINE URINE: NEGATIVE
CANNABINOID SCREEN URINE: NEGATIVE
COCAINE METABOLITE SCREEN URINE: NEGATIVE
GLUCOSE BLD-MCNC: 90 MG/DL (ref 70–99)
HCT VFR BLD CALC: 31.6 % (ref 37–47)
HEMOGLOBIN: 10.3 G/DL (ref 12–16)
Lab: NORMAL
MCH RBC QN AUTO: 24.9 PG (ref 27–31)
MCHC RBC AUTO-ENTMCNC: 32.6 G/DL (ref 33–37)
MCV RBC AUTO: 76.3 FL (ref 81–99)
METHADONE SCREEN, URINE: NEGATIVE
METHAMPHETAMINE, URINE: NEGATIVE
OPIATE SCREEN URINE: NEGATIVE
OXYCODONE URINE: NEGATIVE
PDW BLD-RTO: 13.4 % (ref 11.5–14.5)
PERFORMED ON: NORMAL
PHENCYCLIDINE SCREEN URINE: NEGATIVE
PLATELET # BLD: 222 K/UL (ref 130–400)
PMV BLD AUTO: 10.8 FL (ref 9.4–12.3)
PROPOXYPHENE SCREEN: NEGATIVE
RBC # BLD: 4.14 M/UL (ref 4.2–5.4)
SARS-COV-2, NAAT: NOT DETECTED
TRICYCLIC, URINE: NEGATIVE
WBC # BLD: 7.9 K/UL (ref 4.8–10.8)

## 2022-11-02 PROCEDURE — 86900 BLOOD TYPING SEROLOGIC ABO: CPT

## 2022-11-02 PROCEDURE — 86592 SYPHILIS TEST NON-TREP QUAL: CPT

## 2022-11-02 PROCEDURE — 99222 1ST HOSP IP/OBS MODERATE 55: CPT | Performed by: NURSE PRACTITIONER

## 2022-11-02 PROCEDURE — 85027 COMPLETE CBC AUTOMATED: CPT

## 2022-11-02 PROCEDURE — 80306 DRUG TEST PRSMV INSTRMNT: CPT

## 2022-11-02 PROCEDURE — 6360000002 HC RX W HCPCS: Performed by: NURSE PRACTITIONER

## 2022-11-02 PROCEDURE — 86901 BLOOD TYPING SEROLOGIC RH(D): CPT

## 2022-11-02 PROCEDURE — 7200000001 HC VAGINAL DELIVERY

## 2022-11-02 PROCEDURE — 2500000003 HC RX 250 WO HCPCS

## 2022-11-02 PROCEDURE — 3700000025 EPIDURAL BLOCK

## 2022-11-02 PROCEDURE — 86850 RBC ANTIBODY SCREEN: CPT

## 2022-11-02 PROCEDURE — 82947 ASSAY GLUCOSE BLOOD QUANT: CPT

## 2022-11-02 PROCEDURE — 2580000003 HC RX 258: Performed by: NURSE PRACTITIONER

## 2022-11-02 PROCEDURE — 1220000000 HC SEMI PRIVATE OB R&B

## 2022-11-02 PROCEDURE — 36415 COLL VENOUS BLD VENIPUNCTURE: CPT

## 2022-11-02 PROCEDURE — 87635 SARS-COV-2 COVID-19 AMP PRB: CPT

## 2022-11-02 RX ORDER — SODIUM CHLORIDE, SODIUM LACTATE, POTASSIUM CHLORIDE, AND CALCIUM CHLORIDE .6; .31; .03; .02 G/100ML; G/100ML; G/100ML; G/100ML
1000 INJECTION, SOLUTION INTRAVENOUS PRN
Status: DISCONTINUED | OUTPATIENT
Start: 2022-11-02 | End: 2022-11-02

## 2022-11-02 RX ORDER — DOCUSATE SODIUM 100 MG/1
100 CAPSULE, LIQUID FILLED ORAL 2 TIMES DAILY
Status: DISCONTINUED | OUTPATIENT
Start: 2022-11-02 | End: 2022-11-02

## 2022-11-02 RX ORDER — TERBUTALINE SULFATE 1 MG/ML
0.25 INJECTION, SOLUTION SUBCUTANEOUS
Status: DISCONTINUED | OUTPATIENT
Start: 2022-11-02 | End: 2022-11-02

## 2022-11-02 RX ORDER — CARBOPROST TROMETHAMINE 250 UG/ML
250 INJECTION, SOLUTION INTRAMUSCULAR PRN
Status: DISCONTINUED | OUTPATIENT
Start: 2022-11-02 | End: 2022-11-02

## 2022-11-02 RX ORDER — MISOPROSTOL 200 UG/1
200 TABLET ORAL PRN
Status: DISCONTINUED | OUTPATIENT
Start: 2022-11-02 | End: 2022-11-03 | Stop reason: HOSPADM

## 2022-11-02 RX ORDER — METHYLERGONOVINE MALEATE 0.2 MG/ML
200 INJECTION INTRAVENOUS PRN
Status: DISCONTINUED | OUTPATIENT
Start: 2022-11-02 | End: 2022-11-03 | Stop reason: HOSPADM

## 2022-11-02 RX ORDER — SODIUM CHLORIDE, SODIUM LACTATE, POTASSIUM CHLORIDE, CALCIUM CHLORIDE 600; 310; 30; 20 MG/100ML; MG/100ML; MG/100ML; MG/100ML
INJECTION, SOLUTION INTRAVENOUS CONTINUOUS
Status: DISCONTINUED | OUTPATIENT
Start: 2022-11-02 | End: 2022-11-03 | Stop reason: HOSPADM

## 2022-11-02 RX ORDER — ACETAMINOPHEN 325 MG/1
650 TABLET ORAL EVERY 4 HOURS PRN
Status: DISCONTINUED | OUTPATIENT
Start: 2022-11-02 | End: 2022-11-02

## 2022-11-02 RX ORDER — METHYLERGONOVINE MALEATE 0.2 MG/ML
200 INJECTION INTRAVENOUS PRN
Status: DISCONTINUED | OUTPATIENT
Start: 2022-11-02 | End: 2022-11-02

## 2022-11-02 RX ORDER — LIDOCAINE HYDROCHLORIDE AND EPINEPHRINE 15; 5 MG/ML; UG/ML
INJECTION, SOLUTION EPIDURAL PRN
Status: DISCONTINUED | OUTPATIENT
Start: 2022-11-02 | End: 2022-11-02 | Stop reason: SDUPTHER

## 2022-11-02 RX ORDER — ONDANSETRON 2 MG/ML
4 INJECTION INTRAMUSCULAR; INTRAVENOUS EVERY 6 HOURS PRN
Status: DISCONTINUED | OUTPATIENT
Start: 2022-11-02 | End: 2022-11-02

## 2022-11-02 RX ORDER — SODIUM CHLORIDE, SODIUM LACTATE, POTASSIUM CHLORIDE, CALCIUM CHLORIDE 600; 310; 30; 20 MG/100ML; MG/100ML; MG/100ML; MG/100ML
INJECTION, SOLUTION INTRAVENOUS CONTINUOUS
Status: DISCONTINUED | OUTPATIENT
Start: 2022-11-02 | End: 2022-11-02

## 2022-11-02 RX ORDER — SODIUM CHLORIDE 9 MG/ML
25 INJECTION, SOLUTION INTRAVENOUS PRN
Status: DISCONTINUED | OUTPATIENT
Start: 2022-11-02 | End: 2022-11-02

## 2022-11-02 RX ORDER — CARBOPROST TROMETHAMINE 250 UG/ML
250 INJECTION, SOLUTION INTRAMUSCULAR PRN
Status: DISCONTINUED | OUTPATIENT
Start: 2022-11-02 | End: 2022-11-03 | Stop reason: HOSPADM

## 2022-11-02 RX ORDER — KETOROLAC TROMETHAMINE 30 MG/ML
30 INJECTION, SOLUTION INTRAMUSCULAR; INTRAVENOUS ONCE
Status: COMPLETED | OUTPATIENT
Start: 2022-11-02 | End: 2022-11-02

## 2022-11-02 RX ORDER — SODIUM CHLORIDE 9 MG/ML
INJECTION, SOLUTION INTRAVENOUS PRN
Status: DISCONTINUED | OUTPATIENT
Start: 2022-11-02 | End: 2022-11-03 | Stop reason: HOSPADM

## 2022-11-02 RX ORDER — SODIUM CHLORIDE 0.9 % (FLUSH) 0.9 %
5-40 SYRINGE (ML) INJECTION EVERY 12 HOURS SCHEDULED
Status: DISCONTINUED | OUTPATIENT
Start: 2022-11-02 | End: 2022-11-03 | Stop reason: HOSPADM

## 2022-11-02 RX ORDER — DOCUSATE SODIUM 100 MG/1
100 CAPSULE, LIQUID FILLED ORAL 2 TIMES DAILY
Status: DISCONTINUED | OUTPATIENT
Start: 2022-11-02 | End: 2022-11-03 | Stop reason: HOSPADM

## 2022-11-02 RX ORDER — ROPIVACAINE HYDROCHLORIDE 2 MG/ML
INJECTION, SOLUTION EPIDURAL; INFILTRATION; PERINEURAL PRN
Status: DISCONTINUED | OUTPATIENT
Start: 2022-11-02 | End: 2022-11-02 | Stop reason: SDUPTHER

## 2022-11-02 RX ORDER — SODIUM CHLORIDE 0.9 % (FLUSH) 0.9 %
5-40 SYRINGE (ML) INJECTION EVERY 12 HOURS SCHEDULED
Status: DISCONTINUED | OUTPATIENT
Start: 2022-11-02 | End: 2022-11-02

## 2022-11-02 RX ORDER — LIDOCAINE HYDROCHLORIDE 10 MG/ML
INJECTION, SOLUTION EPIDURAL; INFILTRATION; INTRACAUDAL; PERINEURAL PRN
Status: DISCONTINUED | OUTPATIENT
Start: 2022-11-02 | End: 2022-11-02 | Stop reason: SDUPTHER

## 2022-11-02 RX ORDER — SODIUM CHLORIDE 0.9 % (FLUSH) 0.9 %
5-40 SYRINGE (ML) INJECTION PRN
Status: DISCONTINUED | OUTPATIENT
Start: 2022-11-02 | End: 2022-11-02

## 2022-11-02 RX ORDER — SODIUM CHLORIDE, SODIUM LACTATE, POTASSIUM CHLORIDE, AND CALCIUM CHLORIDE .6; .31; .03; .02 G/100ML; G/100ML; G/100ML; G/100ML
500 INJECTION, SOLUTION INTRAVENOUS PRN
Status: DISCONTINUED | OUTPATIENT
Start: 2022-11-02 | End: 2022-11-02

## 2022-11-02 RX ORDER — SODIUM CHLORIDE 0.9 % (FLUSH) 0.9 %
5-40 SYRINGE (ML) INJECTION PRN
Status: DISCONTINUED | OUTPATIENT
Start: 2022-11-02 | End: 2022-11-03 | Stop reason: HOSPADM

## 2022-11-02 RX ORDER — MISOPROSTOL 200 UG/1
800 TABLET ORAL PRN
Status: DISCONTINUED | OUTPATIENT
Start: 2022-11-02 | End: 2022-11-02

## 2022-11-02 RX ORDER — MISOPROSTOL 200 UG/1
800 TABLET ORAL PRN
Status: DISCONTINUED | OUTPATIENT
Start: 2022-11-02 | End: 2022-11-03 | Stop reason: HOSPADM

## 2022-11-02 RX ORDER — ROPIVACAINE HYDROCHLORIDE 2 MG/ML
INJECTION, SOLUTION EPIDURAL; INFILTRATION; PERINEURAL CONTINUOUS PRN
Status: DISCONTINUED | OUTPATIENT
Start: 2022-11-02 | End: 2022-11-02 | Stop reason: SDUPTHER

## 2022-11-02 RX ORDER — IBUPROFEN 400 MG/1
800 TABLET ORAL EVERY 8 HOURS PRN
Status: DISCONTINUED | OUTPATIENT
Start: 2022-11-02 | End: 2022-11-03 | Stop reason: HOSPADM

## 2022-11-02 RX ADMIN — LIDOCAINE HYDROCHLORIDE AND EPINEPHRINE 2 ML: 15; 5 INJECTION, SOLUTION EPIDURAL at 15:12

## 2022-11-02 RX ADMIN — KETOROLAC TROMETHAMINE 30 MG: 30 INJECTION, SOLUTION INTRAMUSCULAR at 16:51

## 2022-11-02 RX ADMIN — Medication 1 MILLI-UNITS/MIN: at 05:53

## 2022-11-02 RX ADMIN — ROPIVACAINE HYDROCHLORIDE 6 ML: 2 INJECTION, SOLUTION EPIDURAL; INFILTRATION; PERINEURAL at 15:12

## 2022-11-02 RX ADMIN — KETOROLAC TROMETHAMINE 30 MG: 30 INJECTION, SOLUTION INTRAMUSCULAR at 16:52

## 2022-11-02 RX ADMIN — LIDOCAINE HYDROCHLORIDE 2 ML: 10 INJECTION, SOLUTION EPIDURAL; INFILTRATION; INTRACAUDAL; PERINEURAL at 15:00

## 2022-11-02 RX ADMIN — LIDOCAINE HYDROCHLORIDE 3 ML: 10 INJECTION, SOLUTION EPIDURAL; INFILTRATION; INTRACAUDAL; PERINEURAL at 14:56

## 2022-11-02 RX ADMIN — LIDOCAINE HYDROCHLORIDE AND EPINEPHRINE 3 ML: 15; 5 INJECTION, SOLUTION EPIDURAL at 15:02

## 2022-11-02 RX ADMIN — SODIUM CHLORIDE, POTASSIUM CHLORIDE, SODIUM LACTATE AND CALCIUM CHLORIDE: 600; 310; 30; 20 INJECTION, SOLUTION INTRAVENOUS at 15:20

## 2022-11-02 RX ADMIN — BUTORPHANOL TARTRATE 1 MG: 2 INJECTION, SOLUTION INTRAMUSCULAR; INTRAVENOUS at 11:10

## 2022-11-02 RX ADMIN — SODIUM CHLORIDE, POTASSIUM CHLORIDE, SODIUM LACTATE AND CALCIUM CHLORIDE: 600; 310; 30; 20 INJECTION, SOLUTION INTRAVENOUS at 13:15

## 2022-11-02 RX ADMIN — SODIUM CHLORIDE, POTASSIUM CHLORIDE, SODIUM LACTATE AND CALCIUM CHLORIDE: 600; 310; 30; 20 INJECTION, SOLUTION INTRAVENOUS at 05:14

## 2022-11-02 RX ADMIN — FERRIC CARBOXYMALTOSE INJECTION 750 MG: 50 INJECTION, SOLUTION INTRAVENOUS at 19:45

## 2022-11-02 RX ADMIN — ROPIVACAINE HYDROCHLORIDE 8 ML/HR: 2 INJECTION, SOLUTION EPIDURAL; INFILTRATION; PERINEURAL at 15:17

## 2022-11-02 ASSESSMENT — PAIN SCALES - GENERAL
PAINLEVEL_OUTOF10: 7
PAINLEVEL_OUTOF10: 0

## 2022-11-02 ASSESSMENT — PAIN DESCRIPTION - LOCATION: LOCATION: ABDOMEN

## 2022-11-02 NOTE — ANESTHESIA PROCEDURE NOTES
Epidural Block    Patient location during procedure: OB  Start time: 11/2/2022 2:57 PM  End time: 11/2/2022 3:02 PM  Reason for block: labor epidural  Staffing  Performed: resident/CRNA   Resident/CRNA: VELMA Mortensen CRNA  Epidural  Patient position: sitting  Prep: Betadine  Patient monitoring: continuous pulse ox and frequent blood pressure checks  Approach: midline  Location: L3-4  Injection technique: SANDIE saline  Provider prep: mask and sterile gloves  Needle  Needle type: Tuohy   Needle gauge: 17 G  Needle length: 3.5 in  Needle insertion depth: 7 cm  Catheter type: end hole  Catheter size: 19 G  Catheter at skin depth: 12 cm  Test dose: negativeCatheter Secured: tegaderm and tape  Assessment  Sensory level: T10  Hemodynamics: stable  Attempts: 1  Outcomes: uncomplicated and patient tolerated procedure well  Additional Notes  CRNA at pt bedside. Time out performed with pt, CRNA, and RN for placement of epidural block. Risks & benefits explained to the pt, history and physical has been reviewed. Platelets 740. Pt in proper sitting position on edge of hospital bed. Monitors in place, VSS. Back area prepped sterile technique with Betadine x 3. Area allowed to dry thoroughly. Remaining Betadine wiped clean with sterile gauze. Sterile drape placed on pt's back with sterile technique maintained throughout entire procedure. Intradermal Lidocaine 1% injection 3 ml performed with no complications noted. Touhy needle successfully placed with ease with SANDIE with saline, catheter easily threaded into place after SANDIE with saline. Test dose Lidocaine 1.5% with 1:200,000 Epi 3 ml administered, negative test dose noted. Epidural catheter secured properly with tegaderm at 12 cm at pt's skin. Pt laid back into supine position in bed. Ropivacaine 0.2% bolus given with noted aspiration prior to injecting. Ropivacaine 0.2% infusion started at 8 ml/hr.   No complications noted, pt's VSS and pt resting comfortably at this time. Will continue to monitor the pt throughout labor.       Preanesthetic Checklist  Completed: patient identified, IV checked, site marked, risks and benefits discussed, surgical/procedural consents, equipment checked, pre-op evaluation, timeout performed, anesthesia consent given, oxygen available, monitors applied/VS acknowledged, fire risk safety assessment completed and verbalized and blood product R/B/A discussed and consented

## 2022-11-02 NOTE — ANESTHESIA PRE PROCEDURE
Department of Anesthesiology  Preprocedure Note       Name:  Alanna Galindo   Age:  20 y.o.  :  2000                                          MRN:  [id number]         Date:  2022      Surgeon: * No surgeons listed *    Procedure: * No procedures listed *    Medications prior to admission:   Prior to Admission medications    Medication Sig Start Date End Date Taking? Authorizing Provider   LANMAGI SOLOSTAR 100 UNIT/ML injection pen 20 units at bedtime nightly subq  Patient not taking: No sig reported 10/5/22   VELMA Hernandez - ROBIN   B-D ULTRAFINE III SHORT PEN 31G X 8 MM MISC  22   Historical Provider, MD Malik [place] strip USE TO CHECK BLOOD SUGAR EVERY 4 HOURS IF NEEDED  Patient not taking: No sig reported 22   Chen Cedeño APRN - CNMINAL   Blood Glucose Monitoring Suppl (ONETOUCH VERIO REFLECT) w/Device KIT USE AS DIRECTED BY PACKAGE DIRECTIONS TO CHECK BLOOD SUGAR  Patient not taking: No sig reported 22   Historical Provider, MD   Lancets ([address]) [address] [facility] USE TO CHECK BLOOD SUGAR EVERY 4 HOURS IF NEEDED  Patient not taking: No sig reported 22   Historical Provider, MD       Current medications:    No current facility-administered medications for this visit. No current outpatient medications on file.      Facility-Administered Medications Ordered in Other Visits   Medication Dose Route Frequency Provider Last Rate Last Admin    lactated ringers infusion   IntraVENous Continuous Cesar Sorenson APRN -  mL/hr at 22 1315 New Bag at 22 1315    lactated ringers bolus  500 mL IntraVENous PRN Chen Cedeño APRN - CNM        Or    lactated ringers bolus  1,000 mL IntraVENous PRN Cesar Plain, APRN - CNM        sodium chloride flush 0.9 % injection 5-40 mL  5-40 mL IntraVENous 2 times per day Cesar Plain, APRN - CNM        sodium chloride flush 0.9 % injection 5-40 mL  5-40 mL IntraVENous PRN Cesar Plain, APRN - CNM  0.9 % sodium chloride infusion  25 mL IntraVENous PRN Haven Hang, APRN - CNM        ondansetron [facility]) injection 4 mg  4 mg IntraVENous Q6H PRN Haven Hang, APRN - CNM        methylergonovine (METHERGINE) injection 200 mcg  200 mcg IntraMUSCular PRN Haven Hang, APRN - CNM        carboprost (HEMABATE) injection 250 mcg  250 mcg IntraMUSCular PRN Haven Hang, APRN - CNM        miSOPROStol (CYTOTEC) tablet 800 mcg  800 mcg Rectal PRN Haven Hang, APRN - CNM        tranexamic acid (CYKLOKAPRON) 1,000 mg in sodium chloride 0.9 % 100 mL IVPB  1,000 mg IntraVENous Once PRN Haven Hang, APRN - CNM        acetaminophen (TYLENOL) tablet 650 mg  650 mg Oral Q4H PRN Haven Hang, APRN - CNM        benzocaine-menthol (DERMOPLAST) 20-0.5 % spray   Topical PRN Haven Hang, APRN - CNM        docusate sodium (COLACE) capsule 100 mg  100 mg Oral BID Zoe Wilder, APRN - CNM        butorphanol (STADOL) injection 1 mg  1 mg IntraVENous Q3H PRN Haven Hang, APRN - CNM   1 mg at 11/02/22 1110    oxytocin (PITOCIN) 30 units in 500 mL infusion  1-20 marilee-units/min IntraVENous Continuous Haven Hang, APRN - CNM 10 mL/hr at 11/02/22 1113 10 marilee-units/min at 11/02/22 1113    terbutaline (BRETHINE) injection 0.25 mg  0.25 mg SubCUTAneous Once PRN Haven Hang, APRN - CNM        witch hazel-glycerin (TUCKS) pad   Topical PRN Haven Hang, APRN - CNM        phenylephrine-mineral oil-petrolatum (PREPARATION H) rectal ointment   Rectal Q2H PRN Haven Hang, APRN - CNM        hydrocortisone 2.5 % cream   Topical Q2H PRN Haven Hang, APRN - CNM           Allergies:     Allergies   Allergen Reactions    Latex Rash       Problem List:    Patient Active Problem List   Diagnosis Code    Choledocholithiasis K80.50    Abdominal pain, right upper quadrant R10.11    Elevated bilirubin R17    Gallstones K80.20    35 weeks gestation of pregnancy Z3A.35    36 weeks gestation of pregnancy Z3A.36    37 weeks gestation of pregnancy Z3A.37    Gestational diabetes O23.80    Term pregnancy Z34.90       Past Medical History:        Diagnosis Date    Abnormal Pap smear of cervix     Chlamydia     Depression     Diabetes mellitus (HCC)     gestational, last pregnancy    Ectopic pregnancy     Gestational diabetes mellitus     History of blood transfusion     Mental disorder     Overactive bladder     Postpartum depression      delivery      labor     STD (sexually transmitted disease)        Past Surgical History:        Procedure Laterality Date     SECTION      CHOLECYSTECTOMY, LAPAROSCOPIC N/A 2017    CHOLECYSTECTOMY LAPAROSCOPIC, with cholangiogram performed by Rubia Velazquez MD at [place] [address] Left broke below knee cap    OVARIAN CYST REMOVAL Right 07/15/2016       Social History:    Social History     Tobacco Use    Smoking status: Never    Smokeless tobacco: Never   Substance Use Topics    Alcohol use: No                                Counseling given: Not Answered      Vital Signs (Current): There were no vitals filed for this visit.                                            BP Readings from Last 3 Encounters:   22 (!) 105/43   10/27/22 113/62   10/26/22 134/86       NPO Status:                                                                                 BMI:   Wt Readings from Last 3 Encounters:   22 241 lb (109.3 kg)   10/26/22 241 lb (109.3 kg)   10/23/22 238 lb (108 kg)     There is no height or weight on file to calculate BMI.    CBC:   Lab Results   Component Value Date/Time    WBC 7.9 2022 05:41 AM    RBC 4.14 2022 05:41 AM    HGB 10.3 2022 05:41 AM    HCT 31.6 2022 05:41 AM    MCV 76.3 2022 05:41 AM    RDW 13.4 2022 05:41 AM     2022 05:41 AM       CMP:   Lab Results   Component Value Date/Time     2021 09:07 PM    K 4.3 08/31/2021 09:07 PM     08/31/2021 09:07 PM    CO2 26 08/31/2021 09:07 PM    BUN 7 08/31/2021 09:07 PM    CREATININE 0.7 08/31/2021 09:07 PM    GFRAA >59 08/31/2021 09:07 PM    LABGLOM >60 08/31/2021 09:07 PM    GLUCOSE 99 08/31/2021 09:07 PM    PROT 7.4 08/31/2021 09:07 PM    CALCIUM 9.2 08/31/2021 09:07 PM    BILITOT 0.9 08/31/2021 09:07 PM    ALKPHOS 90 08/31/2021 09:07 PM    AST 21 08/31/2021 09:07 PM    ALT 35 08/31/2021 09:07 PM       POC Tests:   Recent Labs     11/02/22  0550   POCGLU 90       Coags:   Lab Results   Component Value Date/Time    PROTIME 14.1 11/16/2020 01:34 PM    INR 1.09 11/16/2020 01:34 PM       HCG (If Applicable):   Lab Results   Component Value Date    PREGTESTUR Negative 01/14/2022        ABGs: No results found for: PHART, PO2ART, VDA9UEH, VHU7RDZ, BEART, J1SZTBFZ     Type & Screen (If Applicable):  No results found for: LABABO, LABRH    Drug/Infectious Status (If Applicable):  No results found for: HIV, HEPCAB    COVID-19 Screening (If Applicable):   Lab Results   Component Value Date/Time    COVID19 Not Detected 11/02/2022 05:13 AM    COVID19 Not Detected 09/01/2021 01:00 PM         Anesthesia Evaluation  Patient summary reviewed and Nursing notes reviewed no history of anesthetic complications:   Airway: Mallampati: II  TM distance: >3 FB   Neck ROM: full  Mouth opening: > = 3 FB   Dental: normal exam         Pulmonary:Negative Pulmonary ROS and normal exam                               Cardiovascular:Negative CV ROS            Rhythm: regular             Beta Blocker:  Not on Beta Blocker         Neuro/Psych:   (+) depression/anxiety             GI/Hepatic/Renal: Neg GI/Hepatic/Renal ROS            Endo/Other:    (+) Diabetes, . Pt had no PAT visit       Abdominal:       Abdomen: soft. Vascular: negative vascular ROS.          Other Findings:             Anesthesia Plan      epidural     ASA 2             Anesthetic plan and risks discussed with patient. Plan discussed with CRNA.                     VELMA Robb - JANE   11/2/2022

## 2022-11-02 NOTE — ANESTHESIA POSTPROCEDURE EVALUATION
Department of Anesthesiology  Postprocedure Note    Patient: Brooke Barton  MRN: [id number]  YOB: 2000  Date of evaluation: 11/2/2022      Procedure Summary     Date: 11/02/22 Room / Location:     Anesthesia Start: 7096 Anesthesia Stop: 1632    Procedure: Labor Analgesia Diagnosis:     Scheduled Providers:  Responsible Provider: VELMA Marinelli CRNA    Anesthesia Type: epidural ASA Status: 2          Anesthesia Type: No value filed.     Dotty Phase I:      Dotty Phase II:        Anesthesia Post Evaluation    Patient location during evaluation: bedside  Patient participation: complete - patient participated  Level of consciousness: awake and alert  Pain score: 0  Airway patency: patent  Nausea & Vomiting: no nausea and no vomiting  Complications: no  Cardiovascular status: hemodynamically stable and blood pressure returned to baseline  Respiratory status: acceptable and room air  Hydration status: stable

## 2022-11-02 NOTE — H&P
[facility] KISHORE GEE    [facility] History and Physical    Provider: VELMA Pang CNM  Date: 2022            8:39 AM    Patient Name: Shima Tee  Patient : 2000  MRN: [id number]   Room/Bed: [facility]7/0217-01    SUBJECTIVE:    CHIEF COMPLAINT:  IOL  Chief Complaint   Patient presents with    Scheduled Induction       HISTORY OF PRESENT ILLNESS:      Shima Tee a 20 y.o. female at 38w0d presents with a chief complaint as above and is being admitted for induction. Her pregnancy has been complicated by GDM, previous C/S, obesity    Contractions: irregular  Rupture of membranes: intact  Vaginal bleeding: none    REVIEW OF SYSTEMS:  A comprehensive review of systems was negative except for what was noted in the HPI. OBJECTIVE:     Estimated Due Date:   Estimated Date of Delivery: 22   Patient's last menstrual period was 2022 (exact date).       PREGNANCY RISK FACTORS:       Patient Active Problem List   Diagnosis    Choledocholithiasis    Abdominal pain, right upper quadrant    Elevated bilirubin    Gallstones    35 weeks gestation of pregnancy    36 weeks gestation of pregnancy    37 weeks gestation of pregnancy    Gestational diabetes    Term pregnancy        Steroids:  no    PAST OB HISTORY:  OB History    Para Term  AB Living   5 3 2 1 1 4   SAB IAB Ectopic Molar Multiple Live Births   1 0 0 0 1 4      # Outcome Date GA Lbr Pavel/2nd Weight Sex Delivery Anes PTL Lv   5 Current            4A  05/10/21 35w2d  4 lb 14 oz (2.21 kg) M CS-LTranv Spinal N LIBERTAD      Name: Vandana Stockton: 8  Apgar5: 9   4B  05/10/21 35w2d  5 lb 13.8 oz (2.66 kg) M CS-LTranv Spinal N LIBERTAD      Name: Vandana Stockton: 7  Apgar5: 8   3 Term 20 38w2d 03:28 / 00:16 6 lb 11.6 oz (3.05 kg) M Vag-Spont EPI N LIBERTAD      Name: Forrest Roberta: [address]: 9   2 SAB 2019 6w0d             Birth Comments: twins   1 Term 16 40w0d 04:27 / 00:28 7 lb 15.3 oz (3.609 kg) M Vag-Spont EPI N LIBERTAD      Apgar1: 9  Apgar5: 9         Depression:  No      Post-partum depression:  No      Diabetes:  No      Gestational Diabetes:  Yes insulin       Thyroid Disease:  No      Chronic HTN:  No      Gestation HTN:  No      Pre-eclampsia:  No      Seizure disorder:  No      Asthma:  No      Clotting disorder:  No      :  Yes       Tubal ligation:  No      D & C:  No      Cerclage:  No      LEEP:  No      Myomectomy:  No    Past Medical History:        Diagnosis Date    Abnormal Pap smear of cervix     Chlamydia     Depression     Diabetes mellitus (HCC)     gestational, last pregnancy    Ectopic pregnancy     Gestational diabetes mellitus     History of blood transfusion     Mental disorder     Overactive bladder     Postpartum depression      delivery      labor     STD (sexually transmitted disease)        Past Surgical History:        Procedure Laterality Date     SECTION      CHOLECYSTECTOMY, LAPAROSCOPIC N/A 2017    CHOLECYSTECTOMY LAPAROSCOPIC, with cholangiogram performed by Georgia Live MD at [address] Left broke below knee cap    OVARIAN CYST REMOVAL Right 07/15/2016       Allergies:    Latex    Social History:    Social History     Socioeconomic History    Marital status:      Spouse name: Not on file    Number of children: Not on file    Years of education: Not on file    Highest education level: Not on file   Occupational History    Not on file   Tobacco Use    Smoking status: Never    Smokeless tobacco: Never   Vaping Use    Vaping Use: Never used   Substance and Sexual Activity    Alcohol use: No    Drug use: No    Sexual activity: Yes     Partners: Male   Other Topics Concern    Not on file   Social History Narrative    Not on file     Social Determinants of Health     Financial Resource Strain: Not on file   Food Insecurity: Not on file   Transportation Needs: Not on file   Physical Activity: Not on file   Stress: Not on file   Social Connections: Not on file   Intimate Partner Violence: Not on file   Housing Stability: Not on file       Family History:       Problem Relation Age of Onset    Cancer Father     Cancer Paternal Aunt     Diabetes Paternal Grandfather        Medications Prior to Admission:  Medications Prior to Admission: LANTUS SOLOSTAR 100 UNIT/ML injection pen, 20 units at bedtime nightly subq (Patient not taking: No sig reported)  B-D ULTRAFINE III SHORT PEN 31G X 8 MM MISC,   ONETOUCH VERIO strip, USE TO CHECK BLOOD SUGAR EVERY 4 HOURS IF NEEDED (Patient not taking: No sig reported)  Blood Glucose Monitoring Suppl (ONETOUCH VERIO REFLECT) w/Device KIT, USE AS DIRECTED BY PACKAGE DIRECTIONS TO CHECK BLOOD SUGAR (Patient not taking: No sig reported)  Lancets (ONETOUCH DELICA PLUS [id number]) MISC, USE TO CHECK BLOOD SUGAR EVERY 4 HOURS IF NEEDED (Patient not taking: No sig reported)    PHYSICAL EXAM:     Vitals:    11/02/22 0501   BP: (!) 99/40   Pulse:    Resp:    Temp:    SpO2: 100%       General appearance:  awake, alert, cooperative, no apparent distress, and appears stated age  Skin:  Warm, dry, no rashes or erythema  Neurologic:  Awake, alert, oriented to name, place and time. Cranial nerves II-XII are grossly intact. Motor is 5 out of 5 bilaterally.   Lungs:  No increased work of breathing, good air exchange, clear to auscultation bilaterally, no crackles or wheezing  Heart:  Normal apical impulse, regular rate and rhythm, normal S1 and S2, no S3 or S4, and no murmur noted  Breast:  Deferred   Abdomen: Gravid, Non-Tender  Extremities:  no calf tenderness, non edematous, DTRs: normal    Pelvic Exam:  FETALPRESENTATION:Cephalic  DILATION:  4 cm  EFFACEMENT:   75%  STATION:  -1 cm  CONSISTENCY:  soft  POSITION:  mid    Cook Score      0        1         2         3        Patient  Dilation                clsd     1-2      3-4      5-6      2  Effacement           0-30 40-50  60-70  >80     3  Station                  -3        -2       -1/0     +1/+2  2  Consistency         Firm    Med     Soft               2  Position                Post    Mid      Ant                1                                                    Cook Score: 10    MEMBRANES:  Intact                                FETAL HEART RATE:    Baseline: 130's      Variability: moderate      Accelerations: present      Decelerations: absent      FHR: Category: I          CONTRACTIONS:   Frequency: irregular  Duration: 50-60sec  Intensity: mild  Resting tone: soft     Lab Results:   Blood Type/Rh:    ABO/Rh   Date Value Ref Range Status   11/02/2022 B POS  Final     Antibody Screen:    Antibody Screen   Date Value Ref Range Status   11/02/2022 NEG  Final     Hemoglobin:   Hemoglobin   Date Value Ref Range Status   11/02/2022 10.3 (L) 12.0 - 16.0 g/dL Final     Hematocrit:   Hematocrit   Date Value Ref Range Status   11/02/2022 31.6 (L) 37.0 - 47.0 % Final     Platelet Count:   Platelets   Date Value Ref Range Status   11/02/2022 222 130 - 400 K/uL Final     Hepatitis B Surface Antigen: NR  Group B Strep:  Neg  RPR: NR      ASSESSMENT/PLAN:  Priya Butterfield is a 20 y.o. female  [id number] At 38w0d    Other:   Admit to LDR with routine order set  Labor support prn  Pain management and epidural when active  GBS prophylaxis per policy  Anticipate vaginal delivery      Risks, benefits, alternatives and possible complications have been discussed in detail with the patient. Admission, and post admission procedures and expectations were discussed in detail. All questions were answered.     Baljit Klein, VELMA - ROBIN

## 2022-11-02 NOTE — PROGRESS NOTES
Pt arrive to L&D for induction of labor. Pt denies bleeding and leaking of fluid no distress noted.  Will monitor

## 2022-11-03 VITALS
BODY MASS INDEX: 42.7 KG/M2 | DIASTOLIC BLOOD PRESSURE: 85 MMHG | RESPIRATION RATE: 18 BRPM | HEIGHT: 63 IN | SYSTOLIC BLOOD PRESSURE: 126 MMHG | TEMPERATURE: 97.6 F | HEART RATE: 75 BPM | WEIGHT: 241 LBS | OXYGEN SATURATION: 100 %

## 2022-11-03 LAB
HCT VFR BLD CALC: 32.4 % (ref 37–47)
HEMOGLOBIN: 10.1 G/DL (ref 12–16)
MCH RBC QN AUTO: 24.8 PG (ref 27–31)
MCHC RBC AUTO-ENTMCNC: 31.2 G/DL (ref 33–37)
MCV RBC AUTO: 79.4 FL (ref 81–99)
PDW BLD-RTO: 13.6 % (ref 11.5–14.5)
PLATELET # BLD: 203 K/UL (ref 130–400)
PMV BLD AUTO: 10.5 FL (ref 9.4–12.3)
RBC # BLD: 4.08 M/UL (ref 4.2–5.4)
RPR: NORMAL
WBC # BLD: 8.9 K/UL (ref 4.8–10.8)

## 2022-11-03 PROCEDURE — 85027 COMPLETE CBC AUTOMATED: CPT

## 2022-11-03 PROCEDURE — 36415 COLL VENOUS BLD VENIPUNCTURE: CPT

## 2022-11-03 PROCEDURE — 99239 HOSP IP/OBS DSCHRG MGMT >30: CPT | Performed by: NURSE PRACTITIONER

## 2022-11-03 PROCEDURE — 6370000000 HC RX 637 (ALT 250 FOR IP): Performed by: NURSE PRACTITIONER

## 2022-11-03 RX ORDER — IBUPROFEN 800 MG/1
800 TABLET ORAL EVERY 8 HOURS PRN
Qty: 90 TABLET | Refills: 0 | Status: SHIPPED | OUTPATIENT
Start: 2022-11-03 | End: 2022-11-17

## 2022-11-03 RX ADMIN — IBUPROFEN 800 MG: 400 TABLET, FILM COATED ORAL at 04:36

## 2022-11-03 RX ADMIN — DOCUSATE SODIUM 100 MG: 100 CAPSULE, LIQUID FILLED ORAL at 08:12

## 2022-11-03 ASSESSMENT — PAIN DESCRIPTION - DESCRIPTORS: DESCRIPTORS: CRAMPING

## 2022-11-03 ASSESSMENT — PAIN - FUNCTIONAL ASSESSMENT: PAIN_FUNCTIONAL_ASSESSMENT: ACTIVITIES ARE NOT PREVENTED

## 2022-11-03 ASSESSMENT — PAIN DESCRIPTION - LOCATION: LOCATION: ABDOMEN

## 2022-11-03 ASSESSMENT — PAIN SCALES - GENERAL: PAINLEVEL_OUTOF10: 7

## 2022-11-03 ASSESSMENT — PAIN DESCRIPTION - ORIENTATION: ORIENTATION: MID

## 2022-11-03 NOTE — FLOWSHEET NOTE
Call placed to [facility] regarding patient request for assistance with formula due to not being able to schedule a [facility] appointment until next Wednesday. VM left to return call to nursery.

## 2022-11-03 NOTE — FLOWSHEET NOTE
Assisted pt to bathroom. Ambulates with strong steady gait. Voids without difficulty. Christel care performed per pt. Gown changed. Ambulates to postpartum room. Oriented to room. Significant other and baby with pt. Denies pain or needs. Refuses colace.

## 2022-11-03 NOTE — DISCHARGE INSTRUCTIONS
POSTPARTUM EDUCATION / DISCHARGE PLANNING    Call Doctor:  1. If temp 100.4 or greater. 2. If foul smelling discharge. 3. If discharge changes from pink or yellow, back to red, and bleeding is heavier than a normal period. 4. If you pass large clots. 5. If abdominal incision starts to separate and/or starts to bleeding, is red and warm to touch or has drainage with a foul odor. 6. Report any pain, redness, or warmth of skin in calf of leg which could indicate a blood clot. Crampin. Cramping is normal; uterus is returning to pre-pregnant state. 2. Moms of twins and mothers of more than one child and breast-feeding mothers will cramp more than first time moms. 3. For relief, place pillow under abdomen and lie on it to apply pressure. Walking may help. Discharge:   1. Discharge will be dark for first few days (similar to menstrual flow). It will turn to pinkish brown after 2-3 days and creamy yellow for an additional week or two. Moderate flow-use of 4-8 pads daily. 2. Small clots are normal.    Episiotomy Care:  1. May use sitz bath 3-4 times daily. 2. Use analgesic foams or sprays or medicine as ordered. 3. Keep perineal area clean. 4. Continue to use will bottle after urinating and gently pat from front to back. 5. Stitches will dissolve on their own. If you have stitches, shower only for 2-4 weeks or as directed by your doctor to allow suture line to heal properly. No baths, hot tubs or swimming pools until told it is alright to do so by your doctor. Abdominal Incision Care:  1. Leave open to air after original dressing is removed. 2. Clean incision with soapy water unless otherwise directed. Return of Periods:  1. You should resume menstruating anywhere from 6-8 weeks after birth; up to 24 weeks if breast-feeding. 2. Breast feeding mothers may also resume menstruating during this time. Breast:  1.  ENGORGEMENT, NON-NURSING MOMS:  Wear supportive, well-fitting bra for two weeks, day and night. May apply ice packs for 20 minutes 4 times daily. Avoid breast stimulation. Avoid heat, especially when showering. This will encourage milk letdown. 2. ENGORGEMENT, NURSING MOMS:  Engorgement usually lasts 1-2 days until breasts adjust to babies needs. 3. NIPPLE CARE, NURSING MOMS:  Cleanse nipples with water only. Soap has a drying effect. 4. DO SELF-BREAST EXAMS MONTHLY. Urine Patterns:  Report inability to urinate, frequency, burning, or urgency to the doctor. Bowel Patterns:  1. You should have a normal stool by 2nd or 3rd day after birth. 2. Drinking fluids, walking, and eating roughage help promote regular stools. Hemorrhoid Care:  1. Take 20-minute sitz bath 3-4 times daily. 2. Use TUCKS. 3. Maintain adequate fluid intake. 4. Avoid prolonged sitting. Hygiene:  1. Apply will pads from front to back. 2. Always clean from front to back. 3. Shower daily. Nutrition/Fluids:  1. A weight loss of 10-12 pounds after birth is normal.  2. It may take from 4 weeks to several months to reach your pre-pregnant state. 3. After weight loss stabilizes, weight loss should not exceed a pound per week. Rest/Sleep:  1. Rest as much as possible. 2. Your first week at home should consist of taking care of yourself and the baby only. Activity:  1. Limit the following activities for 6 weeks. Heavy lifting ,nothing heavier than the baby for first few weeks  No douching, tampons or intercourse for 6 weeks. Wearing constrictive garments around abdomen or legs. 2. May drive in 1-2 weeks or as directed by doctor. 3. Start past-partum exercises. 4. Increased discharge or pain may mean you need to decrease activities. Emotions: You may become depressed, let down or have mood swings and feel weepy for a couple of weeks. This is due to hormonal changes, fatigue, discomfort and over-stimulation, which is normal. Many people refer to this as the baby blues.  This should resolve in a few days. If symptoms worsen contact your doctor. Sexuality:  1. You many have vaginal dryness for first few weeks. May use KY Jelly. 2. You may have difficulty feeling excitement because of fatigue due to recovery and demands of  care. 3. There may be a leakage of breast milk during sexual activity. (You may need to wear a bra.)  4. Vaginal tenderness-use the female superior or icrn-ud-cort position  5. Resume intercourse-abstain from intercourse until episiotomy is healed and discharge has stopped (or as directed by your doctor). Contraception:  1. You can become pregnant while breast-feeding. 2. Use contraception as desired and as discussed with your doctor. Depression After Childbirth: Care Instructions  Overview     Many women get the \"baby blues\" during the first few days after childbirth. You may lose sleep, feel irritable, and cry easily. You may feel happy one minute and sad the next. Hormone changes are one cause of these emotional changes. Also, the demands of a new baby, along with visits from relatives or other family needs, can add to the stress. The \"baby blues\" often peak around thefourth day. Then they ease up in less than 2 weeks. If your moodiness or anxiety lasts for more than 2 weeks, or if you feel like life is not worth living, you may have postpartum depression. This is different for each person. Some mothers with serious depression may worry intensely about their infant's well-being. Others may feel distant from their child. Some mothers may even feel that they might harm their baby. Some may have signs ofparanoia, wondering if someone is watching them. Depression is not a sign of weakness. It's a medical condition that requires treatment. Medicine and counseling often work well to reduce depression. Talkto your doctor about taking antidepressant medicine while breastfeeding. Follow-up care is a key part of your treatment and safety.  Be sure to make and go to all appointments, and call your doctor if you are having problems. It's also a good idea to know your test results and keep alist of the medicines you take. How do you know if you are depressed? With all the changes in your life, you may not know if you are depressed. Pregnancy sometimes causes changes in how you feel that are similar to thesymptoms of depression. Symptoms of depression include:  Feeling sad or hopeless and losing interest in daily activities. These are the most common symptoms of depression. Sleeping too much or not enough. Feeling tired. You may feel as if you have no energy. Eating too much or too little. Writing or talking about death, such as writing suicide notes or talking about guns, knives, or pills. If you or someone you know talks about suicide, self-harm, or feeling hopeless, get help right away. Call the [facility] at 1-800-273-talk (5-167.931.1551) or text HOME to 260177 to access the [employer]. Consider saving these numbers in your phone. How can you care for yourself at home? Be safe with medicines. Take your medicines exactly as prescribed. Call your doctor if you think you are having a problem with your medicine. Eat a healthy diet so that you can keep up your energy. Get regular daily exercise, such as walks, to help improve your mood. Get as much sunlight as possible. Keep your shades and curtains open. Get outside as much as you can. Avoid using alcohol or other substances to feel better. Get as much rest and sleep as possible. Avoid doing too much. Being too tired can increase depression. Play stimulating music throughout your day and soothing music at night. Schedule outings and visits with friends and family. Ask them to call you regularly, so that you don't feel alone. Ask for help with preparing food and other daily tasks. Family and friends are often happy to help with a .   Be honest with yourself and those who care about you. Tell them about your struggle. Join a support group of new parents. No one can better understand the challenges of caring for a  than other new parents.

## 2022-11-03 NOTE — DISCHARGE SUMMARY
[facility] KISHORE GEE [facility]   Discharge Summary    Patient Name: Becka Sanchez  Patient : 2000  Room/Bed: 0212/0212-01  Primary Care Physician: Charles Herring  Admit Date: 2022  4:52 AM    Reasons for Admission on 2022  4:52 AM  Term pregnancy [Z34.90]  No comment available  Induction of Labor    Patient Active Problem List   Diagnosis    Choledocholithiasis    Abdominal pain, right upper quadrant    Elevated bilirubin    Gallstones    35 weeks gestation of pregnancy    36 weeks gestation of pregnancy    37 weeks gestation of pregnancy    Gestational diabetes    Term pregnancy       Becka Sanchez is a 20y.o. year old [id number] who presented at 38w0d gestation with Scheduled Induction  . Her pregnancy has been complicated by Gestations Diabetes   Please see H&P for detailed information. She was admitted and progressed in labor with pitocin for induction and Epidural anesthesia to completely dilated and effaced. No Immediate complications were encountered. Please see procedure note for detailed information. Her postpartum course has been unremarkable. See H&H history below. She had no signs or symptoms of acute blood loss anemia. She is ambulating well, voiding without difficulty and her lochia is within normal limits. She is feeding by breast and bottle without difficulty. She was stable for discharge on postpartum day 1.      Hemoglobin   Date Value Ref Range Status   2022 10.1 (L) 12.0 - 16.0 g/dL Final   2022 10.3 (L) 12.0 - 16.0 g/dL Final     Hematocrit   Date Value Ref Range Status   2022 32.4 (L) 37.0 - 47.0 % Final   2022 31.6 (L) 37.0 - 47.0 % Final         Prenatal Procedures  None    Intrapartum Procedures  Spontaneous Vaginal Delivery:     Postpartum Procedures  injectafer    Berne Data  Information for the patient's :  Clem Ryan Boy Wing Rummage [[id number]]   male   Birth Weight: 6 lb 15.1 oz (3.15 kg)       Postpartum Day 1: Vaginal      REVIEW OF SYSTEMS  Patient is a [id number] The patient feels well. The patient denies emotional concerns. Pain is well controlled with current medications. The baby iswell. Baby is feeding via both breast and bottle - Similac with iron. Urinary output is adequate. The patient is ambulating well. The patient is tolerating a normal diet. PHYSICAL EXAM     /85   Pulse 75   Temp 97.6 °F (36.4 °C) (Temporal)   Resp 18   Ht 5' 3\" (1.6 m)   Wt 241 lb (109.3 kg)   LMP 2022 (Exact Date)   SpO2 100%   Breastfeeding Unknown   BMI 42.69 kg/m²   General:    alert, appears stated age, and cooperative   Breast:  Soft, non-tender   Bowel Sounds:  active   Lochia:  appropriate   Uterine Fundus:   firm   Episiotomy/lac:  healing well   DVT Evaluation:  No evidence of DVT seen on physical exam.     Lab Results   Component Value Date    WBC 8.9 2022    HGB 10.1 (L) 2022    HCT 32.4 (L) 2022    MCV 79.4 (L) 2022     2022         DISCHARGE DIAGNOSIS:  Successful     Discharge Information/Patient Instructions:     Medication List        CONTINUE taking these medications      B-D ULTRAFINE III SHORT PEN 31G X 8 MM Misc  Generic drug: Insulin Pen Needle     OneTouch Delica Plus [id number] Misc     OneTouch Verio Reflect w/Device Kit            ASK your doctor about these medications      Lantus SoloStar 100 UNIT/ML injection pen  Generic drug: insulin glargine  20 units at bedtime nightly subq     OneTouch Verio strip  Generic drug: blood glucose test strips  USE TO CHECK BLOOD SUGAR EVERY 4 HOURS IF NEEDED              No discharge procedures on file.     Activity & Diet: Precautions and instructions were discussed with her including but not limited to maintaining a regular diet at home, practicing local hygiene, pelvic rest, and signs and symptoms to report including heavy bleeding, frequent passage of clots, fainting or dizziness, foul odor of lochia, increased pain, fever, or any other concerns. Wound Care: N/A    Discharge to: Home in stable condition  She was asked to follow up in the office in 2 weeks. Discharge Date: 11/3/2022      VELMA Wright CNM      Comments: Follow-up care and birth control were reviewed. Signs and symptoms of mastitis and Post Partum Depression were reviewed. The patient is to notify her physician if any of these occur. The patient was counseled on secondary smoke risks and the increased risk of sudden infant death syndrome and respiratory problems to her baby with exposure. She was counseled on various alternate recommendations to decrease the exposure to secondary smoke to her children. Total time spent on discharge and coordination of care is >30 minutes. All questions were answered and the patient voiced understanding.

## 2022-11-03 NOTE — CARE COORDINATION
Informed by RN the pt does not have [address] appt until Weds 11/9 and concerned about having enough money to purchase formula until that appt. Pt and FOB present at bedside report would use the remaining food stamps they have to purchase if necessary. SW provided a [employer] gift card for $25 to purchase formula and other needs if necessary. Pt denies further needs and SW provided contact information for further assistance as necessary.

## 2022-11-04 ENCOUNTER — HOSPITAL ENCOUNTER (EMERGENCY)
Age: 22
Discharge: LEFT BEFORE DISCHARGE | End: 2022-11-05

## 2022-11-04 VITALS
BODY MASS INDEX: 43.08 KG/M2 | HEART RATE: 94 BPM | RESPIRATION RATE: 16 BRPM | TEMPERATURE: 97 F | SYSTOLIC BLOOD PRESSURE: 132 MMHG | WEIGHT: 234.13 LBS | OXYGEN SATURATION: 100 % | HEIGHT: 62 IN | DIASTOLIC BLOOD PRESSURE: 89 MMHG

## 2022-11-04 ASSESSMENT — PAIN DESCRIPTION - LOCATION: LOCATION: ABDOMEN

## 2022-11-04 ASSESSMENT — PAIN DESCRIPTION - ORIENTATION: ORIENTATION: LEFT;LOWER

## 2022-11-04 ASSESSMENT — PAIN DESCRIPTION - DESCRIPTORS: DESCRIPTORS: SQUEEZING

## 2022-11-04 ASSESSMENT — PAIN SCALES - GENERAL: PAINLEVEL_OUTOF10: 8

## 2022-11-11 NOTE — L&D DELIVERY NOTE
Mother's Information      Labor Events     Labor?: No  Cervical Ripening:   Now               Hunter Walker [[id number]]      Labor Events     Labor?: No  Cervical Ripening Date/Time:     Antibiotics Received during Labor?: No  Rupture Date/Time: 22 13:54:00   Rupture Type: AROM, Intact  Fluid Color: Clear  Fluid Odor: None  Induction: AROM, Oxytocin  Labor Complications: None       Anesthesia    Method: Epidural       Start Pushing      Labor onset date/time: 22 11:07:00 Now     Dilation complete date/time: 22 16:14:00 CDT Now     Start pushing date/time: 2022 16:31:00   Decision date/time (emergent ):           Labor Length    1st stage: 5h 07m  2nd stage: 0h 18m  3rd stage: 0h 09m       Delivery ()      Delivery Date/Time:  22 16:32:00   Delivery Type: Vaginal, Spontaneous    Details:            [place] Presentation    Presentation: Vertex       Shoulder Dystocia    Shoulder Dystocia Present?: No  Add Second Maneuver  Add Third Maneuver  Add Fourth Maneuver  Add Fifth Maneuver  Add Sixth Maneuver  Add Seventh Maneuver  Add Eighth Maneuver  Add Ninth Maneuver       Assisted Delivery Details    Forceps Attempted?: No  Vacuum Extractor Attempted?: No       Document Additional Attempt         Document Additional Attempt                 Cord    Vessels: 3 Vessels  Complications: Nuchal Loose  Cord Around: Head  Delayed Cord Clamping?: Yes  Cord Clamped Date/Time: 2022 16:34:00  Cord Blood Disposition: Discard  Gases Sent?: No       Placenta    Date/Time: 2022 16:41:00  Removal: Spontaneous  Appearance: Intact  Disposition: Discarded       Lacerations    Episiotomy: None  Perineal Lacerations: None  Other Lacerations: no non-perineal laceration       Vaginal Counts    Initial Count Personnel: Derrick Huang  Initial Count Verified By: Osiel Mayberry    Sponges Needles Instruments   Initial Counts Correct Correct Correct   Final Counts Correct Correct Correct   Final Count Personnel: Noa Hurtado  Final Count Verified By: Donovan Can  If the count is incorrect due to Intentionally Retained Foreign Object (IRFO) add the IRFO LDA in Lines/Drains. Add LDA: Link to [facility]       Blood Loss  Mother: Siria Lafleur #[id number]     Start of Mother's Information      Delivery Blood Loss  22 1107 - 22 1632      Quantitative Blood Loss (mL) Hospital Encounter 300 grams    Total  300 mL               End of Mother's Information  Mother: Siria Lafleur #[id number]                Delivery Providers    Delivering clinician: VELMA Garcia CNM     Provider Role    Ronda Gardiner, RN Primary Nurse    Neo Alejandro, RN Primary [place] Nurse    VELMA Garcia CNM Midwife    Neo Alejandro, RN Nursery Nurse    Juan Najera [facility]              [place] Assessment    Living Status: Living  Delivery Location Comment: 217     Apgar Scoring Key:    0 1 2    Skin Color: Blue or pale Acrocyanotic Completely pink    Heart Rate: Absent <100 bpm >100 bpm    Reflex Irritability: No response Grimace Cry or active withdrawal    Muscle Tone: Limp Some flexion Active motion    Respiratory Effort: Absent Weak cry; hypoventilation Good, crying                      Skin Color:   Heart Rate:   Reflex Irritability:   Muscle Tone:   Respiratory Effort:    Total:            1 Minute:    1    2    2    1    2    8        Apgar 1 total from OB History    5 Minute:    2    2    2    2    2    10        Apgar 5 total from OB History    10 Minute:              15 Minute:              20 Minute:                        Apgars Assigned By: Renita Alexandre              Resuscitation    Method: Bulb Suction, Stimulation             [place] Measurements      Birth Weight: 3150 g Birth Length: 0.483 m     Head Circumference: 0.368 m               Title      Skin to Skin Initiation Date/Time: 22 16:35:05 CDT     Skin to Skin With: Mother     Skin to Skin End Date/Time: 22 16:50:00     Breastfeeding Initiated Date/Time: 2022 17:00:57                AMTSL performed. Mother and vigorous  stable in recovery.

## 2022-11-15 ENCOUNTER — HOSPITAL ENCOUNTER (OUTPATIENT)
Dept: LACTATION | Facility: HOSPITAL | Age: 22
Discharge: HOME | End: 2022-11-15

## 2022-11-15 NOTE — LACTATION NOTE
FILI DELIVERY    Mother's Name:  Yasmin Rosa  Contact Number:  [phone]  G/P:    5/4  Breastfeeding Hx:  Adria- 6yrs:  attempted;  Chandler-2yrs:  1 month;  Twin boys, Levi/Mikie 18 mos: no     attempt  Significant Medical History: Denies: breast surgeries, PCOS, infertility, DM, HTN, thryoid dx  Maternal Breast Assessment:  Bilateral slightly pendulous conical shaped breasts, somewhat wide intramammary spacing, nipples well-everted, milk easily expressed.    Infant's Name:  Estelita Loza   Date of Birth:   [birth year]  Gestational age at Birth: 38-0  Age:    13 days  Physician:   KELSIE Brown                     Reason for Visit:  Transfer eval, flange fit, help with keeping infant awake at breast          Infant's Birth weight:  6-15 3147g  Previous Weight:  6-9    Today's Weight:     7-5.0 3316 Well past birth weight!    Feeding History Since Discharge/Last Lactation Appt.: Infant receives two, 2 oz bottles of EBM  per day with the rest of day feeds being at breast. At night, he receives 2 bottle feeds of 4-6 oz formula. Mom collects 1 1/2 oz total per pumping session. Mom wears MomCozy every 2 hours daytime, and every 3 hours at night. She does not use the Medela. She feels slight uncomfortableness at onset of pumping. Last feeding was 90 mins prior to consult. Mom power pumps once per day. She is using size 24mm flanges. Size 21 mm would be better fit per breast exam/eval.    Past 24 Hours Voids/Stools:   8/2-3     Color of Stool: yellow to green           Left Breast: 20 mins +4mls        Right Breast:  10 mins +8mls                           Total Minutes:       30      Total Weight Gain:      12    gms    Average Feeding Amount for Age: 60 mls or 2 oz every feed; every 3 hours or sooner per cues.    Interventions:Observed bfing session. Taught suck training per gloved finger. Weak suck noted. [place] also did not keep seal with lips. Mom feeding in cradle hold. Deeper latch attained with cross  cradle and mom compressing breast during feed. Very few swallows noted. Infant required [place] reflex to stay awake for feed. Taught same to mother. Gave 2 mls from  to infant. Gave size 21 mm medela flanges, Ameda manual pump, Lansinoh , med cup, 1mls syringe, flange fit guide handout and other as below.     Education:galactogogues, flange fit guide, how to increase milk, baby sit ups, gave Ameda manual pump, gave  and how to use, continued need for pumping to maintain / increase milk supply.    Feeding Plan:   Continue pumping as you are with size 21 mm flanges or slightly smaller. Use on MomCozy and Medela.   Keep your power pump session.   Hold skin to skin MORE, wear [place] around house skin to skin.  Take a bath with him and try bfing in tub.  Try a few galactogogues. Switch to a different one if you do not see improvement within 5 days.   Compress breasts while Estelita is feeding.  Always feed skin to skin.  Always compress breasts while he's feeding.   Get deep latch with arms open hugging breast.   Limit time at each breast to 15 mins until Estelita is more proficient at bfing.   Always PACE feed bottles with the nipple only HALF full of milk/formula so that Estelita has to work and suck out the liquid. This mimics bfing and will help when he goes to breast.   Use baby sit ups to keep Estelita drinking at breast.   Use swallowing triggers to keep him drinking at breast. (neck/chin)  Use finger or paci suck strengthening training by gently pulling almost out of mouth, enticing him to draw it back in.   Use finger suck training approx for 20 secs prior to latching to aid better bfing behavior. (stroke stroke press; pad side up)    Plan of Care:    Interventions accomplished satisfactorily, requires no further action.    Future Appointments:    Lactation: As desired     Physician: KELSIE Brown     Signature: CHRISTIAN Zheng    Date:  11/15/22

## 2022-11-17 ENCOUNTER — POSTPARTUM VISIT (OUTPATIENT)
Dept: OBGYN CLINIC | Age: 22
End: 2022-11-17
Payer: MEDICAID

## 2022-11-17 VITALS
BODY MASS INDEX: 42.33 KG/M2 | SYSTOLIC BLOOD PRESSURE: 100 MMHG | DIASTOLIC BLOOD PRESSURE: 72 MMHG | HEIGHT: 62 IN | WEIGHT: 230 LBS

## 2022-11-17 DIAGNOSIS — O34.219 VBAC, DELIVERED: ICD-10-CM

## 2022-11-17 PROCEDURE — G8427 DOCREV CUR MEDS BY ELIG CLIN: HCPCS | Performed by: NURSE PRACTITIONER

## 2022-11-17 PROCEDURE — S3005 EVAL SELF-ASSESS DEPRESSION: HCPCS | Performed by: NURSE PRACTITIONER

## 2022-11-17 PROCEDURE — 99213 OFFICE O/P EST LOW 20 MIN: CPT | Performed by: NURSE PRACTITIONER

## 2022-11-17 PROCEDURE — 1036F TOBACCO NON-USER: CPT | Performed by: NURSE PRACTITIONER

## 2022-11-17 PROCEDURE — 1111F DSCHRG MED/CURRENT MED MERGE: CPT | Performed by: NURSE PRACTITIONER

## 2022-11-17 PROCEDURE — G8417 CALC BMI ABV UP PARAM F/U: HCPCS | Performed by: NURSE PRACTITIONER

## 2022-11-17 PROCEDURE — G8484 FLU IMMUNIZE NO ADMIN: HCPCS | Performed by: NURSE PRACTITIONER

## 2022-11-17 RX ORDER — METOCLOPRAMIDE 5 MG/1
5 TABLET ORAL 3 TIMES DAILY
Qty: 90 TABLET | Refills: 0 | Status: SHIPPED | OUTPATIENT
Start: 2022-11-17

## 2022-11-17 NOTE — PATIENT INSTRUCTIONS
Patient Education        After Pregnancy: Exercises  Introduction  Here are some examples of exercises that can help after pregnancy. Start each exercise slowly. Ease off the exercise if you start to have pain. Your doctor or physical therapist will tell you when you can start these exercises and which ones will work best for you. How to do the exercises  Tummy tuck  Lie on your back, and place two fingers just inside your hip bones so you can feel your lower belly muscles. Take a deep breath in. As you breathe out, pull your belly button in toward your spine, as if you are trying to zip up a tight pair of jeans. You should feel your lower belly muscles pull slightly away from your fingers as the muscles tighten. Hold for about 6 seconds, but do not hold your breath. Repeat 8 to 12 times. Repeat several times a day, and try to hold your lower belly muscles in for longer as you get stronger. Practice doing this exercise while you are standing, such as when you are standing in line, or sitting. Heel slides  Lie on the floor with your knees bent, and place two fingers just inside your hip bones so you can feel your lower belly muscles. Your feet should be flat on the floor. Pull your belly button in toward your spine. You should feel your lower belly muscles pull slightly away from your fingers as the muscles tighten. Keep holding your belly button in as you slowly slide one foot along the floor until your leg is out straight. Slowly slide the leg back to your starting position while making sure that you keep holding your belly button in. Do not arch or move your back as you are doing this. Do not hold your breath. Relax and repeat with your other leg. Repeat 8 to 12 times. Knee-to-chest stretch  Lie on your back with one knee bent and the other leg straight. Clasp your hands together under your bent knee and bring the knee to your chest. Keep your lower back pressed to the floor.   If it hurts your back to keep your opposite leg straight as you stretch, bend that knee too, and keep that foot flat on the floor. Hold for at least 15 to 30 seconds. Relax and lower the knee to the starting position. Repeat with the other leg. Repeat 2 to 4 times with each leg. Neck rotation  Sit in a firm chair, or stand up straight. Keeping your chin level, turn your head to the right, and hold for 15 to 30 seconds. Turn your head to the left and hold for 15 to 30 seconds. Repeat 2 to 4 times to each side. Shoulder rolls  Sit comfortably with your feet shoulder-width apart. You can also do this exercise while standing. Roll your shoulders up, then back, and then down in a smooth, circular motion. Repeat 2 to 4 times. Midback stretch  If you have knee pain, do not do this exercise. Kneel on the floor, and sit back on your ankles. Lean forward, place your hands on the floor, and stretch your arms out in front of you. Rest your head between your arms. Gently push your chest toward the floor, reaching as far in front of you as possible. Hold for 15 to 30 seconds. Repeat 2 to 4 times. Back stretches  Get down on your hands and knees on the floor. Relax your head and allow it to droop. Round your back up toward the ceiling until you feel a nice stretch in your upper, middle, and lower back. Hold this stretch for as long as it feels comfortable, or about 15 to 30 seconds. Return to the starting position with a flat back while you are on your hands and knees. Let your back sway by pressing your stomach toward the floor. Lift your buttocks toward the ceiling. Hold this position for 15 to 30 seconds. Repeat 2 to 4 times. Hamstring stretch (lying down)  Lie flat on your back with your legs straight. If you feel discomfort in your back, place a small towel roll under your lower back. Holding the back of your leg, lift your leg straight up and toward your body until you feel a stretch at the back of your thigh.   Hold the stretch for at least 30 seconds. Repeat 2 to 4 times. Switch legs and repeat steps 1 through 4. Calf stretch  Stand facing a wall with your hands on the wall at about eye level. Put your leg about a step behind your other leg. Keeping your back leg straight and your back heel on the floor, bend your front knee and gently bring your hip and chest toward the wall until you feel a stretch in the calf of your back leg. Hold the stretch for 15 to 30 seconds. Repeat 2 to 4 times. Repeat steps 1 through 4, but this time keep your back knee bent. Switch legs and repeat steps 1 through 5. Follow-up care is a key part of your treatment and safety. Be sure to make and go to all appointments, and call your doctor if you are having problems. It's also a good idea to know your test results and keep a list of the medicines you take. Current as of: February 23, 2022               Content Version: 13.4  © 2006-2022 [employer]. Care instructions adapted under license by [facility] ([facility]). If you have questions about a medical condition or this instruction, always ask your healthcare professional. [place] [address] any warranty or liability for your use of this information. Patient Education        Kegel Exercises: Care Instructions  Overview     Kegel exercises strengthen muscles around the bladder. These muscles control the flow of urine. Kegel exercises are sometime called \"pelvic floor\" exercises. They can help prevent urine leakage and keep the pelvic organs in place. Kegel exercises can strengthen pelvic muscles that have been weakened by age, pregnancy, childbirth, and surgery. They may help prevent or treat urine leakage. You do Kegel exercises by squeezing your pelvic floor muscles. You will likely need to do these exercises for several weeks to get better. Follow-up care is a key part of your treatment and safety.  Be sure to make and go to all appointments, and call your doctor if you are having problems. It's also a good idea to know your test results and keep a list of the medicines you take. How can you care for yourself at home? To do Kegel exercises:  Squeeze your muscles as if you were trying not to pass gas. Or squeeze your muscles as if you were stopping the flow of urine. Your belly, legs, and buttocks shouldn't move. Hold the squeeze for 3 seconds, then relax for 5 to 10 seconds. Start with 3 seconds, then add 1 second each week until you are able to squeeze for 10 seconds. Repeat the exercise 10 times a session. Do 3 to 8 sessions a day. When learning what muscles to squeeze, you can try stopping the flow of urine a few times. But don't make it a practice to do Kegels while urinating. If doing these exercises causes pain, stop doing them and talk with your doctor. Sometimes people have pelvic floor muscles that are too tight. In these cases, doing Kegel exercises may cause more problems. Check with your doctor if you don't notice a difference after trying these exercises for several weeks. Your doctor may suggest getting help from a physical therapist or recommend other treatment. Where can you learn more? Go to https://AmigoCAT.YourTime Solutions. org and sign in to your Microinox account. Enter Q175 in the Hundo box to learn more about \"Kegel Exercises: Care Instructions. \"     If you do not have an account, please click on the \"Sign Up Now\" link. Current as of: June 16, 2022               Content Version: 13.4  © 2006-2022 Healthwise, Incorporated. Care instructions adapted under license by [facility] ([facility]). If you have questions about a medical condition or this instruction, always ask your healthcare professional. [place] [address] any warranty or liability for your use of this information.        Patient Education        Depression After Childbirth: Care Instructions  It's common to lose sleep, feel irritable, and cry easily during the first few days after childbirth. Hormone changes and the demands of a new baby can cause these \"baby blues. \" If these mood changes last more than 2 weeks, you may have postpartum depression. This is a medical condition that requires treatment. If you have any of these signs, you may be depressed. See your doctor right away. You feel very sad or hopeless and lose interest in daily activities. You sleep too much or not enough. You feel tired or as if you have no energy. You eat too much or too little. You write or talk about death. Where to get help 24 hours a day, 7 days a week   If you or someone you know talks about suicide, self-harm, a mental health crisis, a substance use crisis, or any other kind of emotional distress, get help right away. You can:   Call the Suicide and Crisis Lifeline at 65. Call 9-099-516-AKZR (). 4-643.748.5174    Text HOME to 510850 to access the Crisis Text Line. Consider saving these numbers in your phone. What you can do   Try to go to all of your counseling sessions. Take medicines as directed. Eat healthy foods. Get daily exercise, such as walks. Try to get some sunlight every day. Avoid using alcohol or other substances. Get as much rest as possible. Connect with friends, and join a support group for new parents. When should you call for help? Call 732 if: You feel you cannot stop from hurting yourself, your baby, or someone else. Call your doctor now or seek immediate medical care if:    You are having trouble caring for yourself or your baby. You hear voices. Contact your doctor if:    You have problems with your medicines. You do not get better as expected. Follow-up care is a key part of your treatment and safety. Be sure to make and go to all appointments, and call your doctor if you are having problems.  It's also a good idea to know your test results and keep a list of the medicines you take.  Where can you learn more? Go to https://chpepiceweb.uParts. org and sign in to your AddressReport account. Enter F282 in the [employer] box to learn more about \"Depression After Childbirth: Care Instructions. \"     If you do not have an account, please click on the \"Sign Up Now\" link. Current as of: February 9, 2022               Content Version: 13.4  © 2006-2022 [employer]. Care instructions adapted under license by [facility] ([facility]). If you have questions about a medical condition or this instruction, always ask your healthcare professional. [place] [address] any warranty or liability for your use of this information. Patient Education        Stress in Parents of Infants: Care Instructions  Your Care Instructions     Meeting the increased demands of being a new parent can be a big challenge. It is easy to get overtired and overwhelmed during the first weeks. What used to be a simple chore, such as buying groceries, is not so simple now. Plus, you have new chores, including feeding and changing your new baby. At the end of the day, you may be so tired that you feel like crying. Instead of looking forward to the next day, you may be dreading tomorrow. Like many new parents, you are burned out from the stress of having a new baby. Stress affects each of us differently, and the most effective ways to relieve it are different for each person. You can try different methods to find out which ones work best for you. As the weeks go by, you will begin to develop a rhythm with your baby. Tasks that now seem to take forever will become easier. Many women get the \"baby blues\" during the first few days after childbirth. If you are a new mother and the \"baby blues\" last more than a few days, call your doctor right away. Depression is a medical condition that requires treatment. Follow-up care is a key part of your treatment and safety.  Be sure to make and go to all appointments, and call your doctor if you are having problems. It's also a good idea to know your test results and keep a list of the medicines you take. How can you care for yourself at home? Be kind to yourself. Your new baby takes a lot of work, but he or she can give you a lot of pleasure too. Do not worry about housekeeping for a while. Allow your friends to bring you meals or do chores. Limit visitors to as few as you feel you can handle, or ask them not to visit for a while. Before they come, set a limit on how long they will stay. Sleep when your baby sleeps. Even a short nap helps. Find what triggers your stress, and avoid those things as much as you can. If you breastfeed, learn how to collect and store some breast milk so your partner or  can feed the baby while you sleep. Experts usually recommend waiting about a month until breastfeeding is going well before offering a bottle. Eat a balanced diet so you can keep up your energy. Drink plenty of fluids throughout the day. Avoid caffeine and alcohol. Caffeine is found in coffee, tea, cola drinks, chocolate, and other foods. Limit medicines that can make you more tired, such as tranquilizers and cold and allergy medicines. Get regular daily exercise, such as walks, to help improve your mood. Rest after you exercise. Be honest with yourself and those who care about you. Tell them you are stressed and tired. Talking to other new parents can help. Ask your doctor or child's doctor to suggest support groups for new parents. Hearing that someone else is having the same experiences you are can help a lot. If you have the baby blues for more than a few days, call your doctor right away. When should you call for help? Call 911 anytime you think you may need emergency care. For example, call if:    You have thoughts of hurting yourself, your baby, or another person.    Call your doctor now or seek immediate medical care if:    You are having trouble caring for yourself or your baby. Watch closely for changes in your health, and be sure to contact your doctor if you have any problems. Where can you learn more? Go to https://Free & Clearpeflorencia.Hashtrack. org and sign in to your Wi-Chi account. Enter H142 in the [facility] box to learn more about \"Stress in Parents of Infants: Care Instructions. \"     If you do not have an account, please click on the \"Sign Up Now\" link. Current as of: February 23, 2022               Content Version: 13.4  © 2006-2022 [employer]. Care instructions adapted under license by [facility] ([facility]). If you have questions about a medical condition or this instruction, always ask your healthcare professional. [place] [address] any warranty or liability for your use of this information. Patient Education        Nutrition for Breastfeeding: Care Instructions  Overview     If you are breastfeeding, your doctor may suggest that you eat more calories each day than otherwise recommended for a person of your height and weight. Breastfeeding helps build the bond between you and your baby. It gives your baby excellent health benefits. A healthy diet includes eating a variety of foods from the basic food groups: grains, vegetables, fruits, milk and milk products (such as cheese and yogurt), and meat and dried beans. Eating well during breastfeeding will ensure that you stay healthy. Follow-up care is a key part of your treatment and safety. Be sure to make and go to all appointments, and call your doctor if you are having problems. It's also a good idea to know your test results and keep a list of the medicines you take. How can you care for yourself at home? Making good choices about what you eat and drink when you are breastfeeding can help you stay healthy. It can also help your baby stay healthy. Here are some things you can do. Eat a variety of healthy foods.   This includes vegetables, fruits, milk products, whole grains, and protein. Drink plenty of fluids. Make water your first choice. If you have kidney, heart, or liver disease and have to limit fluids, talk with your doctor before you increase your fluids. Avoid fish with high mercury. This includes shark, swordfish, marie mackerel, and marlin. It also includes orange roughy, bigeye tuna, and tilefish from the [facility]. Instead, eat fish that is low in mercury. Choose canned light tuna, salmon, pollock, catfish, or shrimp. Limit caffeine. Things like coffee, tea, chocolate, and some sodas can contain caffeine. Caffeine can pass through breast milk to your baby. It may cause fussiness and sleep problems. Talk to your doctor about how much caffeine is safe for you. Limit alcohol. Alcohol can pass through breast milk to your baby. Talk to your doctor if you have questions about drinking alcohol while breastfeeding. Be safe with supplements. Talk with your doctor before taking any vitamins, minerals, and herbal or other dietary supplements. When should you call for help? Watch closely for changes in your health, and be sure to contact your doctor if you have any problems. Where can you learn more? Go to https://meinKaufpeYooLottoeb.OnetoOnetext. org and sign in to your TransitScreen account. Enter P234 in the HypeSpark box to learn more about \"Nutrition for Breastfeeding: Care Instructions. \"     If you do not have an account, please click on the \"Sign Up Now\" link. Current as of: May 9, 2022               Content Version: 13.4  © 1895-3176 Healthwise, Incorporated. Care instructions adapted under license by [facility] ([facility]). If you have questions about a medical condition or this instruction, always ask your healthcare professional. [place] [address] any warranty or liability for your use of this information.

## 2022-11-17 NOTE — PROGRESS NOTES
labor     STD (sexually transmitted disease)      Past Surgical History:   Procedure Laterality Date     SECTION  05/10/2021    CHOLECYSTECTOMY, LAPAROSCOPIC N/A 2017    CHOLECYSTECTOMY LAPAROSCOPIC, with cholangiogram performed by Dennise Bell MD at [address] Left broke below knee cap    OVARIAN CYST REMOVAL Right 07/15/2016     Family History   Problem Relation Age of Onset    Cancer Father     Cancer Paternal Aunt     Diabetes Paternal Grandfather      Social History     Tobacco Use    Smoking status: Never    Smokeless tobacco: Never   Substance Use Topics    Alcohol use: No       No current outpatient medications on file. No current facility-administered medications for this visit. Allergies   Allergen Reactions    Latex Rash     Vitals:    22 1459   BP: 100/72     Body mass index is 42.07 kg/m². Review of Systems   Constitutional: Negative. HENT: Negative. Eyes: Negative. Respiratory: Negative. Cardiovascular: Negative. Gastrointestinal: Negative. Endocrine: Negative. Genitourinary: Negative. Negative for dysuria, flank pain, pelvic pain, vaginal bleeding, vaginal discharge and vaginal pain. Musculoskeletal: Negative. Skin: Negative. Allergic/Immunologic: Negative. Neurological: Negative. Hematological: Negative. Psychiatric/Behavioral:  Positive for dysphoric mood. The patient is nervous/anxious. Physical Exam  Vitals and nursing note reviewed. Constitutional:       Appearance: Normal appearance. She is well-developed. She is not diaphoretic. HENT:      Head: Normocephalic and atraumatic. Nose: Nose normal.   Eyes:      Extraocular Movements: Extraocular movements intact. Conjunctiva/sclera: Conjunctivae normal.      Pupils: Pupils are equal, round, and reactive to light. Neck:      Thyroid: No thyromegaly. Trachea: No tracheal deviation.    Pulmonary:      Effort: Pulmonary effort is normal. No respiratory distress. Musculoskeletal:         General: Normal range of motion. Cervical back: Normal range of motion and neck supple. Comments: Normal ROM for upper and lower extremities. Gait steady. Skin:     General: Skin is warm and dry. Findings: No lesion or rash. Neurological:      Mental Status: She is alert and oriented to person, place, and time. Psychiatric:         Mood and Affect: Mood normal.         Speech: Speech normal.         Behavior: Behavior normal.        Diagnosis Orders   1. 2 weeks postpartum follow-up  NY EVAL SELF-ASSESS DEPRESSION      2. , delivered        3. Postpartum care following vaginal delivery            MEDICATIONS:  No orders of the defined types were placed in this encounter. ORDERS:  Orders Placed This Encounter   Procedures    NY EVAL SELF-ASSESS DEPRESSION       PLAN:  PP- 2wk - continue with current restrictions and f/u in 4wks. Contraceptive management- appt on  for surgical consult for TL with Dr Merissa Kirby.

## 2022-12-05 ENCOUNTER — PRE-ADMISSION TESTING (OUTPATIENT)
Dept: PREADMISSION TESTING | Facility: HOSPITAL | Age: 22
End: 2022-12-05

## 2022-12-05 VITALS
SYSTOLIC BLOOD PRESSURE: 128 MMHG | DIASTOLIC BLOOD PRESSURE: 65 MMHG | OXYGEN SATURATION: 98 % | BODY MASS INDEX: 39.3 KG/M2 | HEIGHT: 65 IN | HEART RATE: 79 BPM | WEIGHT: 235.89 LBS | RESPIRATION RATE: 18 BRPM

## 2022-12-05 LAB
ALBUMIN SERPL-MCNC: 4.1 G/DL (ref 3.5–5.2)
ALBUMIN/GLOB SERPL: 1.5 G/DL
ALP SERPL-CCNC: 82 U/L (ref 39–117)
ALT SERPL W P-5'-P-CCNC: 41 U/L (ref 1–33)
ANION GAP SERPL CALCULATED.3IONS-SCNC: 12 MMOL/L (ref 5–15)
AST SERPL-CCNC: 20 U/L (ref 1–32)
BILIRUB SERPL-MCNC: 0.6 MG/DL (ref 0–1.2)
BUN SERPL-MCNC: 8 MG/DL (ref 6–20)
BUN/CREAT SERPL: 11 (ref 7–25)
CALCIUM SPEC-SCNC: 9.2 MG/DL (ref 8.6–10.5)
CHLORIDE SERPL-SCNC: 103 MMOL/L (ref 98–107)
CO2 SERPL-SCNC: 27 MMOL/L (ref 22–29)
CREAT SERPL-MCNC: 0.73 MG/DL (ref 0.57–1)
DEPRECATED RDW RBC AUTO: 43.8 FL (ref 37–54)
EGFRCR SERPLBLD CKD-EPI 2021: 118.7 ML/MIN/1.73
ERYTHROCYTE [DISTWIDTH] IN BLOOD BY AUTOMATED COUNT: 14.8 % (ref 12.3–15.4)
GLOBULIN UR ELPH-MCNC: 2.7 GM/DL
GLUCOSE SERPL-MCNC: 109 MG/DL (ref 65–99)
HCT VFR BLD AUTO: 40 % (ref 34–46.6)
HGB BLD-MCNC: 12.3 G/DL (ref 12–15.9)
MCH RBC QN AUTO: 24.9 PG (ref 26.6–33)
MCHC RBC AUTO-ENTMCNC: 30.8 G/DL (ref 31.5–35.7)
MCV RBC AUTO: 81.1 FL (ref 79–97)
PLATELET # BLD AUTO: 258 10*3/MM3 (ref 140–450)
PMV BLD AUTO: 9.3 FL (ref 6–12)
POTASSIUM SERPL-SCNC: 3.8 MMOL/L (ref 3.5–5.2)
PROT SERPL-MCNC: 6.8 G/DL (ref 6–8.5)
RBC # BLD AUTO: 4.93 10*6/MM3 (ref 3.77–5.28)
SODIUM SERPL-SCNC: 142 MMOL/L (ref 136–145)
WBC NRBC COR # BLD: 6.9 10*3/MM3 (ref 3.4–10.8)

## 2022-12-05 PROCEDURE — 80053 COMPREHEN METABOLIC PANEL: CPT

## 2022-12-05 PROCEDURE — 85027 COMPLETE CBC AUTOMATED: CPT

## 2022-12-05 PROCEDURE — 36415 COLL VENOUS BLD VENIPUNCTURE: CPT

## 2022-12-05 NOTE — DISCHARGE INSTRUCTIONS
Before you come to the hospital        Arrival time: AS DIRECTED BY OFFICE     YOU MAY TAKE THE FOLLOWING MEDICATION(S) THE MORNING OF SURGERY WITH A SIP OF WATER: none           ALL OTHER HOME MEDICATION CHECK WITH YOUR PHYSICIAN (especially if   you are taking diabetes medicines or blood thinners)    Do not take any Erectile Dysfunction medications (EX: CIALIS, VIAGRA) 24 hours prior to surgery.      If you were given and instructed to use a germ- killing soap, use as directed the night before surgery and again the morning of surgery or as directed by your surgeon. (Use half of the bottle with each shower.)   See attached information for How to Use Chlorhexidine for Bathing if applicable.            Eating and drinking restrictions prior to scheduled arrival time    2 Hours before arrival time STOP   Drinking Clear liquids (water, apple juice-no pulp)     6 Hours before arrival time STOP   Milk or drinks that contain milk, full liquids    6 Hours before arrival time STOP   Light meals or foods, such as toast or cereal    8 Hours before arrival time STOP   Heavy foods, such as meat, fried foods, or fatty foods    (It is extremely important that you follow these guidelines to prevent delay or cancelation of your procedure)     Clear Liquids  Water and flavored water                                                                      Clear Fruit juices, such as cranberry juice and apple juice.  Black coffee (NO cream of any kind, including powdered).  Plain tea  Clear bouillon or broth.  Flavored gelatin.  Soda.  Gatorade or Powerade.  Full liquid examples  Juices that have pulp.  Frozen ice pops that contain fruit pieces.  Coffee with creamer  Milk.  Yogurt.                MANAGING PAIN AFTER SURGERY    We know you are probably wondering what your pain will be like after surgery.  Following surgery it is unrealistic to expect you will not have pain.   Pain is how our bodies let us know that something is wrong or  cautions us to be careful.  That said, our goal is to make your pain tolerable.    Methods we may use to treat your pain include (oral or IV medications, PCAs, epidurals, nerve blocks, etc.)   While some procedures require IV pain medications for a short time after surgery, transitioning to pain medications by mouth allows for better management of pain.   Your nurse will encourage you to take oral pain medications whenever possible.  IV medications work almost immediately, but only last a short while.  Taking medications by mouth allows for a more constant level of medication in your blood stream for a longer period of time.      Once your pain is out of control it is harder to get back under control.  It is important you are aware when your next dose of pain medication is due.  If you are admitted, your nurse may write the time of your next dose on the white board in your room to help you remember.      We are interested in your pain and encourage you to inform us about aggravating factors during your visit.   Many times a simple repositioning every few hours can make a big difference.    If your physician says it is okay, do not let your pain prevent you from getting out of bed. Be sure to call your nurse for assistance prior to getting up so you do not fall.      Before surgery, please decide your tolerable pain goal.  These faces help describe the pain ratings we use on a 0-10 scale.   Be prepared to tell us your goal and whether or not you take pain or anxiety medications at home.          Preparing for Surgery  Preparing for surgery is an important part of your care. It can make things go more smoothly and help you avoid complications. The steps leading up to surgery may vary among hospitals. Follow all instructions given to you by your health care providers. Ask questions if you do not understand something. Talk about any concerns that you have.  Here are some questions to consider asking before your  surgery:  If my surgery is not an emergency (is elective), when would be the best time to have the surgery?  What arrangements do I need to make for work, home, or school?  What will my recovery be like? How long will it be before I can return to normal activities?  Will I need to prepare my home? Will I need to arrange care for me or my children?  Should I expect to have pain after surgery? What are my pain management options? Are there nonmedical options that I can try for pain?  Tell a health care provider about:  Any allergies you have.  All medicines you are taking, including vitamins, herbs, eye drops, creams, and over-the-counter medicines.  Any problems you or family members have had with anesthetic medicines.  Any blood disorders you have.  Any surgeries you have had.  Any medical conditions you have.  Whether you are pregnant or may be pregnant.  What are the risks?  The risks and complications of surgery depend on the specific procedure that you have. Discuss all the risks with your health care providers before your surgery. Ask about common surgical complications, which may include:  Infection.  Bleeding or a need for blood replacement (transfusion).  Allergic reactions to medicines.  Damage to surrounding nerves, tissues, or structures.  A blood clot.  Scarring.  Failure of the surgery to correct the problem.  Follow these instructions before the procedure:  Several days or weeks before your procedure  You may have a physical exam by your primary health care provider to make sure it is safe for you to have surgery.  You may have testing. This may include a chest X-ray, blood and urine tests, electrocardiogram (ECG), or other testing.  Ask your health care provider about:  Changing or stopping your regular medicines. This is especially important if you are taking diabetes medicines or blood thinners.  Taking medicines such as aspirin and ibuprofen. These medicines can thin your blood. Do not take these  medicines unless your health care provider tells you to take them.  Taking over-the-counter medicines, vitamins, herbs, and supplements.  Do not use any products that contain nicotine or tobacco, such as cigarettes and e-cigarettes. If you need help quitting, ask your health care provider.  Avoid alcohol.  Ask your health care provider if there are exercises you can do to prepare for surgery.  Eat a healthy diet.   Plan to have someone take you home from the hospital or clinic.  Plan to have a responsible adult care for you for at least 24 hours after you leave the hospital or clinic. This is important.  The day before your procedure  You may be given antibiotic medicine to take by mouth to help prevent infection. Take it as told by your health care provider.  You may be asked to shower with a germ-killing soap.  Follow instructions from your health care provider about eating and drinking restrictions. This includes gum, mints and hard candy.  Pack comfortable clothes according to your procedure.   The day of your procedure  You may need to take another shower with a germ-killing soap before you leave home in the morning.  With a small sip of water, take only the medicines that you are told to take.  Remove all jewelry including rings.   Leave anything you consider valuable at home except hearing aids if needed.  You do not need to bring your home medications into the hospital.   Do not wear any makeup, nail polish, powder, deodorant, lotion, hair accessories, or anything on your skin or body except your clothes.  If you will be staying in the hospital, bring a case to hold your glasses, contacts, or dentures. You may also want to bring your robe and non-skid footwear.  If you wear oxygen at home, bring it with you the day of surgery.  If instructed by your health care provider, bring your sleep apnea device with you on the day of your surgery (if this applies to you).  You may want to leave your suitcase and sleep  apnea device in the car until after surgery.   Arrive at the hospital as scheduled.  Bring a friend or family member with you who can help to answer questions and be present while you meet with your health care provider.  At the hospital  When you arrive at the hospital:  Go to registration located at the main entrance of the hospital. You will be registered and given a beeper and a sticker sheet. Take the stickers to the Outpatient nurses desk and place in the black tray. This is to notify staff that you have arrived. Then return to the lobby to wait.   When your beeper lights up and vibrates proceed through the double doors, under the stairs, and a member of the Outpatient Surgery staff will escort you to your preoperative room.  You may have to wear compression sleeves. These help to prevent blood clots and reduce swelling in your legs.  An IV may be inserted into one of your veins.              In the operating room, you may be given one or more of the following:        A medicine to help you relax (sedative).        A medicine to numb the area (local anesthetic).        A medicine to make you fall asleep (general anesthetic).        A medicine that is injected into an area of your body to numb everything below the                      injection site (regional anesthetic).  You may be given an antibiotic through your IV to help prevent infection.  Your surgical site will be marked or identified.    Contact a health care provider if you:  Develop a fever of more than 100.4°F (38°C) or other feelings of illness during the 48 hours before your surgery.  Have symptoms that get worse.  Have questions or concerns about your surgery.  Summary  Preparing for surgery can make the procedure go more smoothly and lower your risk of complications.  Before surgery, make a list of questions and concerns to discuss with your surgeon. Ask about the risks and possible complications.  In the days or weeks before your surgery,  follow all instructions from your health care provider. You may need to stop smoking, avoid alcohol, follow eating restrictions, and change or stop your regular medicines.  Contact your surgeon if you develop a fever or other signs of illness during the few days before your surgery.  This information is not intended to replace advice given to you by your health care provider. Make sure you discuss any questions you have with your health care provider.  Document Revised: 12/21/2018 Document Reviewed: 10/23/2018  Elsevier Patient Education © 2021 [employer]

## 2022-12-07 RX ORDER — FLUOXETINE HYDROCHLORIDE 20 MG/1
20 CAPSULE ORAL DAILY
Qty: 30 CAPSULE | Refills: 3 | Status: SHIPPED | OUTPATIENT
Start: 2022-12-07

## 2022-12-12 ENCOUNTER — HOSPITAL ENCOUNTER (OUTPATIENT)
Facility: HOSPITAL | Age: 22
Setting detail: HOSPITAL OUTPATIENT SURGERY
Discharge: HOME | End: 2022-12-12
Attending: OBSTETRICS & GYNECOLOGY | Admitting: OBSTETRICS & GYNECOLOGY

## 2022-12-12 ENCOUNTER — ANESTHESIA EVENT (OUTPATIENT)
Dept: PERIOP | Facility: HOSPITAL | Age: 22
End: 2022-12-12

## 2022-12-12 ENCOUNTER — ANESTHESIA (OUTPATIENT)
Dept: PERIOP | Facility: HOSPITAL | Age: 22
End: 2022-12-12

## 2022-12-12 VITALS
TEMPERATURE: 97.4 F | HEART RATE: 101 BPM | OXYGEN SATURATION: 98 % | RESPIRATION RATE: 16 BRPM | SYSTOLIC BLOOD PRESSURE: 105 MMHG | DIASTOLIC BLOOD PRESSURE: 73 MMHG

## 2022-12-12 DIAGNOSIS — Z98.890 S/P LAPAROSCOPY: Primary | ICD-10-CM

## 2022-12-12 DIAGNOSIS — Z30.2 STERILIZATION: ICD-10-CM

## 2022-12-12 LAB — B-HCG UR QL: NEGATIVE

## 2022-12-12 PROCEDURE — 25010000002 PROPOFOL 10 MG/ML EMULSION: Performed by: NURSE ANESTHETIST, CERTIFIED REGISTERED

## 2022-12-12 PROCEDURE — 88302 TISSUE EXAM BY PATHOLOGIST: CPT | Performed by: OBSTETRICS & GYNECOLOGY

## 2022-12-12 PROCEDURE — 25010000002 HYDROMORPHONE 1 MG/ML SOLUTION: Performed by: NURSE ANESTHETIST, CERTIFIED REGISTERED

## 2022-12-12 PROCEDURE — 25010000002 KETOROLAC TROMETHAMINE PER 15 MG: Performed by: NURSE ANESTHETIST, CERTIFIED REGISTERED

## 2022-12-12 PROCEDURE — 25010000002 DEXAMETHASONE PER 1 MG: Performed by: NURSE ANESTHETIST, CERTIFIED REGISTERED

## 2022-12-12 PROCEDURE — 25010000002 DEXAMETHASONE PER 1 MG: Performed by: ANESTHESIOLOGY

## 2022-12-12 PROCEDURE — 25010000002 FENTANYL CITRATE (PF) 100 MCG/2ML SOLUTION: Performed by: NURSE ANESTHETIST, CERTIFIED REGISTERED

## 2022-12-12 PROCEDURE — 81025 URINE PREGNANCY TEST: CPT | Performed by: OBSTETRICS & GYNECOLOGY

## 2022-12-12 RX ORDER — MIDAZOLAM HYDROCHLORIDE 1 MG/ML
2 INJECTION INTRAMUSCULAR; INTRAVENOUS
Status: DISCONTINUED | OUTPATIENT
Start: 2022-12-12 | End: 2022-12-12 | Stop reason: HOSPADM

## 2022-12-12 RX ORDER — DEXAMETHASONE SODIUM PHOSPHATE 4 MG/ML
INJECTION, SOLUTION INTRA-ARTICULAR; INTRALESIONAL; INTRAMUSCULAR; INTRAVENOUS; SOFT TISSUE AS NEEDED
Status: DISCONTINUED | OUTPATIENT
Start: 2022-12-12 | End: 2022-12-12 | Stop reason: SURG

## 2022-12-12 RX ORDER — OXYCODONE AND ACETAMINOPHEN 10; 325 MG/1; MG/1
1 TABLET ORAL ONCE AS NEEDED
Status: DISCONTINUED | OUTPATIENT
Start: 2022-12-12 | End: 2022-12-12 | Stop reason: HOSPADM

## 2022-12-12 RX ORDER — ROCURONIUM BROMIDE 10 MG/ML
INJECTION, SOLUTION INTRAVENOUS AS NEEDED
Status: DISCONTINUED | OUTPATIENT
Start: 2022-12-12 | End: 2022-12-12 | Stop reason: SURG

## 2022-12-12 RX ORDER — DROPERIDOL 2.5 MG/ML
0.62 INJECTION, SOLUTION INTRAMUSCULAR; INTRAVENOUS ONCE AS NEEDED
Status: DISCONTINUED | OUTPATIENT
Start: 2022-12-12 | End: 2022-12-12 | Stop reason: HOSPADM

## 2022-12-12 RX ORDER — SODIUM CHLORIDE, SODIUM LACTATE, POTASSIUM CHLORIDE, CALCIUM CHLORIDE 600; 310; 30; 20 MG/100ML; MG/100ML; MG/100ML; MG/100ML
100 INJECTION, SOLUTION INTRAVENOUS CONTINUOUS
Status: DISCONTINUED | OUTPATIENT
Start: 2022-12-12 | End: 2022-12-12 | Stop reason: HOSPADM

## 2022-12-12 RX ORDER — NALOXONE HCL 0.4 MG/ML
0.4 VIAL (ML) INJECTION AS NEEDED
Status: DISCONTINUED | OUTPATIENT
Start: 2022-12-12 | End: 2022-12-12 | Stop reason: HOSPADM

## 2022-12-12 RX ORDER — IBUPROFEN 600 MG/1
600 TABLET ORAL ONCE AS NEEDED
Status: DISCONTINUED | OUTPATIENT
Start: 2022-12-12 | End: 2022-12-12 | Stop reason: HOSPADM

## 2022-12-12 RX ORDER — NEOSTIGMINE METHYLSULFATE 5 MG/5 ML
SYRINGE (ML) INTRAVENOUS AS NEEDED
Status: DISCONTINUED | OUTPATIENT
Start: 2022-12-12 | End: 2022-12-12 | Stop reason: SURG

## 2022-12-12 RX ORDER — LIDOCAINE HYDROCHLORIDE 10 MG/ML
0.5 INJECTION, SOLUTION EPIDURAL; INFILTRATION; INTRACAUDAL; PERINEURAL ONCE AS NEEDED
Status: DISCONTINUED | OUTPATIENT
Start: 2022-12-12 | End: 2022-12-12 | Stop reason: SDUPTHER

## 2022-12-12 RX ORDER — SODIUM CHLORIDE, SODIUM LACTATE, POTASSIUM CHLORIDE, CALCIUM CHLORIDE 600; 310; 30; 20 MG/100ML; MG/100ML; MG/100ML; MG/100ML
1000 INJECTION, SOLUTION INTRAVENOUS CONTINUOUS
Status: DISCONTINUED | OUTPATIENT
Start: 2022-12-12 | End: 2022-12-12 | Stop reason: HOSPADM

## 2022-12-12 RX ORDER — BUPIVACAINE HYDROCHLORIDE AND EPINEPHRINE 5; 5 MG/ML; UG/ML
INJECTION, SOLUTION PERINEURAL AS NEEDED
Status: DISCONTINUED | OUTPATIENT
Start: 2022-12-12 | End: 2022-12-12 | Stop reason: HOSPADM

## 2022-12-12 RX ORDER — EPHEDRINE SULFATE 50 MG/ML
INJECTION, SOLUTION INTRAVENOUS AS NEEDED
Status: DISCONTINUED | OUTPATIENT
Start: 2022-12-12 | End: 2022-12-12 | Stop reason: SURG

## 2022-12-12 RX ORDER — SODIUM CHLORIDE 0.9 % (FLUSH) 0.9 %
3-10 SYRINGE (ML) INJECTION AS NEEDED
Status: DISCONTINUED | OUTPATIENT
Start: 2022-12-12 | End: 2022-12-12 | Stop reason: HOSPADM

## 2022-12-12 RX ORDER — SODIUM CHLORIDE 0.9 % (FLUSH) 0.9 %
3 SYRINGE (ML) INJECTION EVERY 12 HOURS SCHEDULED
Status: DISCONTINUED | OUTPATIENT
Start: 2022-12-12 | End: 2022-12-12 | Stop reason: HOSPADM

## 2022-12-12 RX ORDER — OXYCODONE HYDROCHLORIDE AND ACETAMINOPHEN 5; 325 MG/1; MG/1
1 TABLET ORAL EVERY 6 HOURS PRN
Qty: 20 TABLET | Refills: 0 | Status: SHIPPED | OUTPATIENT
Start: 2022-12-12 | End: 2023-03-08

## 2022-12-12 RX ORDER — FENTANYL CITRATE 50 UG/ML
INJECTION, SOLUTION INTRAMUSCULAR; INTRAVENOUS AS NEEDED
Status: DISCONTINUED | OUTPATIENT
Start: 2022-12-12 | End: 2022-12-12 | Stop reason: SURG

## 2022-12-12 RX ORDER — IBUPROFEN 800 MG/1
800 TABLET ORAL EVERY 8 HOURS PRN
Qty: 20 TABLET | Refills: 0 | Status: SHIPPED | OUTPATIENT
Start: 2022-12-12 | End: 2023-03-08

## 2022-12-12 RX ORDER — LIDOCAINE HYDROCHLORIDE 10 MG/ML
0.5 INJECTION, SOLUTION EPIDURAL; INFILTRATION; INTRACAUDAL; PERINEURAL ONCE AS NEEDED
Status: DISCONTINUED | OUTPATIENT
Start: 2022-12-12 | End: 2022-12-12 | Stop reason: HOSPADM

## 2022-12-12 RX ORDER — KETOROLAC TROMETHAMINE 30 MG/ML
INJECTION, SOLUTION INTRAMUSCULAR; INTRAVENOUS AS NEEDED
Status: DISCONTINUED | OUTPATIENT
Start: 2022-12-12 | End: 2022-12-12 | Stop reason: SURG

## 2022-12-12 RX ORDER — FENTANYL CITRATE 50 UG/ML
25 INJECTION, SOLUTION INTRAMUSCULAR; INTRAVENOUS
Status: DISCONTINUED | OUTPATIENT
Start: 2022-12-12 | End: 2022-12-12 | Stop reason: HOSPADM

## 2022-12-12 RX ORDER — FLUMAZENIL 0.1 MG/ML
0.2 INJECTION INTRAVENOUS AS NEEDED
Status: DISCONTINUED | OUTPATIENT
Start: 2022-12-12 | End: 2022-12-12 | Stop reason: HOSPADM

## 2022-12-12 RX ORDER — ONDANSETRON 2 MG/ML
4 INJECTION INTRAMUSCULAR; INTRAVENOUS ONCE AS NEEDED
Status: CANCELLED | OUTPATIENT
Start: 2022-12-12

## 2022-12-12 RX ORDER — PROPOFOL 10 MG/ML
VIAL (ML) INTRAVENOUS AS NEEDED
Status: DISCONTINUED | OUTPATIENT
Start: 2022-12-12 | End: 2022-12-12 | Stop reason: SURG

## 2022-12-12 RX ORDER — ACETAMINOPHEN 500 MG
1000 TABLET ORAL ONCE
Status: COMPLETED | OUTPATIENT
Start: 2022-12-12 | End: 2022-12-12

## 2022-12-12 RX ORDER — LIDOCAINE HYDROCHLORIDE 20 MG/ML
INJECTION, SOLUTION EPIDURAL; INFILTRATION; INTRACAUDAL; PERINEURAL AS NEEDED
Status: DISCONTINUED | OUTPATIENT
Start: 2022-12-12 | End: 2022-12-12 | Stop reason: SURG

## 2022-12-12 RX ORDER — SODIUM CHLORIDE 0.9 % (FLUSH) 0.9 %
3 SYRINGE (ML) INJECTION AS NEEDED
Status: DISCONTINUED | OUTPATIENT
Start: 2022-12-12 | End: 2022-12-12 | Stop reason: HOSPADM

## 2022-12-12 RX ORDER — OXYCODONE AND ACETAMINOPHEN 7.5; 325 MG/1; MG/1
2 TABLET ORAL EVERY 4 HOURS PRN
Status: DISCONTINUED | OUTPATIENT
Start: 2022-12-12 | End: 2022-12-12 | Stop reason: HOSPADM

## 2022-12-12 RX ORDER — LIDOCAINE 50 MG/G
PATCH TOPICAL AS NEEDED
Status: DISCONTINUED | OUTPATIENT
Start: 2022-12-12 | End: 2022-12-12 | Stop reason: HOSPADM

## 2022-12-12 RX ORDER — LABETALOL HYDROCHLORIDE 5 MG/ML
5 INJECTION, SOLUTION INTRAVENOUS
Status: DISCONTINUED | OUTPATIENT
Start: 2022-12-12 | End: 2022-12-12 | Stop reason: HOSPADM

## 2022-12-12 RX ORDER — SODIUM CHLORIDE 9 MG/ML
40 INJECTION, SOLUTION INTRAVENOUS AS NEEDED
Status: DISCONTINUED | OUTPATIENT
Start: 2022-12-12 | End: 2022-12-12 | Stop reason: HOSPADM

## 2022-12-12 RX ORDER — DEXAMETHASONE SODIUM PHOSPHATE 4 MG/ML
4 INJECTION, SOLUTION INTRA-ARTICULAR; INTRALESIONAL; INTRAMUSCULAR; INTRAVENOUS; SOFT TISSUE ONCE AS NEEDED
Status: COMPLETED | OUTPATIENT
Start: 2022-12-12 | End: 2022-12-12

## 2022-12-12 RX ADMIN — ROCURONIUM BROMIDE 30 MG: 10 SOLUTION INTRAVENOUS at 12:28

## 2022-12-12 RX ADMIN — LIDOCAINE HYDROCHLORIDE 100 MG: 20 INJECTION, SOLUTION EPIDURAL; INFILTRATION; INTRACAUDAL; PERINEURAL at 12:28

## 2022-12-12 RX ADMIN — DEXAMETHASONE SODIUM PHOSPHATE 4 MG: 4 INJECTION INTRA-ARTICULAR; INTRALESIONAL; INTRAMUSCULAR; INTRAVENOUS; SOFT TISSUE at 11:21

## 2022-12-12 RX ADMIN — FENTANYL CITRATE 100 MCG: 50 INJECTION, SOLUTION INTRAMUSCULAR; INTRAVENOUS at 12:28

## 2022-12-12 RX ADMIN — GLYCOPYRROLATE 0.4 MG: 0.2 INJECTION INTRAMUSCULAR; INTRAVENOUS at 13:03

## 2022-12-12 RX ADMIN — ACETAMINOPHEN 1000 MG: 500 TABLET ORAL at 11:21

## 2022-12-12 RX ADMIN — OXYCODONE HYDROCHLORIDE AND ACETAMINOPHEN 2 TABLET: 7.5; 325 TABLET ORAL at 14:21

## 2022-12-12 RX ADMIN — PROPOFOL 200 MG: 10 INJECTION, EMULSION INTRAVENOUS at 12:28

## 2022-12-12 RX ADMIN — EPHEDRINE SULFATE 10 MG: 50 INJECTION INTRAVENOUS at 12:44

## 2022-12-12 RX ADMIN — HYDROMORPHONE HYDROCHLORIDE 1 MG: 1 INJECTION, SOLUTION INTRAMUSCULAR; INTRAVENOUS; SUBCUTANEOUS at 12:56

## 2022-12-12 RX ADMIN — SODIUM CHLORIDE, POTASSIUM CHLORIDE, SODIUM LACTATE AND CALCIUM CHLORIDE 1000 ML: 600; 310; 30; 20 INJECTION, SOLUTION INTRAVENOUS at 10:15

## 2022-12-12 RX ADMIN — DEXAMETHASONE SODIUM PHOSPHATE 4 MG: 4 INJECTION, SOLUTION INTRA-ARTICULAR; INTRALESIONAL; INTRAMUSCULAR; INTRAVENOUS; SOFT TISSUE at 13:03

## 2022-12-12 RX ADMIN — KETOROLAC TROMETHAMINE 30 MG: 30 INJECTION, SOLUTION INTRAMUSCULAR; INTRAVENOUS at 13:03

## 2022-12-12 RX ADMIN — Medication 3 MG: at 13:03

## 2022-12-12 RX ADMIN — KETOROLAC TROMETHAMINE 30 MG: 30 INJECTION, SOLUTION INTRAMUSCULAR; INTRAVENOUS at 12:56

## 2022-12-12 NOTE — ANESTHESIA PREPROCEDURE EVALUATION
Anesthesia Evaluation     Patient summary reviewed   no history of anesthetic complications:  NPO Solid Status: > 8 hours             Airway   Mallampati: I  TM distance: >3 FB  Neck ROM: full  No difficulty expected  Dental - normal exam     Pulmonary - negative pulmonary ROS   Cardiovascular - negative cardio ROS  Exercise tolerance: excellent (>7 METS)        Neuro/Psych- negative ROS  GI/Hepatic/Renal/Endo - negative ROS     Musculoskeletal (-) negative ROS    Abdominal    Substance History      OB/GYN          Other                        Anesthesia Plan    ASA 2     general     intravenous induction     Anesthetic plan, risks, benefits, and alternatives have been provided, discussed and informed consent has been obtained with: patient.        CODE STATUS:

## 2022-12-12 NOTE — ANESTHESIA PROCEDURE NOTES
Airway  Urgency: elective    Date/Time: 12/12/2022 12:30 PM  Airway not difficult    General Information and Staff    Patient location during procedure: OR  CRNA/CAA: Timmy Gilbert CRNA    Indications and Patient Condition  Indications for airway management: airway protection    Preoxygenated: yes  Mask difficulty assessment: 1 - vent by mask    Final Airway Details  Final airway type: endotracheal airway      Successful airway: ETT  Cuffed: yes   Successful intubation technique: direct laryngoscopy  Endotracheal tube insertion site: oral  Blade: Martinez  Blade size: 2  ETT size (mm): 7.0  Cormack-Lehane Classification: grade I - full view of glottis  Placement verified by: capnometry   Measured from: lips  ETT/EBT  to lips (cm): 21  Number of attempts at approach: 1  Assessment: lips, teeth, and gum same as pre-op and atraumatic intubation

## 2022-12-12 NOTE — OP NOTE
[facility]  Op Note: Lap Salpingectomies and endometrial ablation  Yasmin Rosa  : 2000  MRN: [id number]  CSN: [id number]     Preoperative Dx: Desires permanent sterilization , menorrhagia      Postoperative Dx: s/p di salpingectomies and s/p endometrial ablation     Procedure Preformed: LAPARSCOPIC BILATERAL SALPINGECTOMY, CHERI  ABLATION (N/A)    Surgeon:  Tomas Dawkins MD    Anesthesia: General    Estimated Blood Loss: 100ml    Findings: Normal tubes and ovaries     Description of Procedure: Patient was given general anesthesia, prepped and draped in the lithotomy position. The bladder was drained. A single-tooth tenaculum was put on the anterior lip of the cervix and the VCare cannula was placed. Then I proceeded to work abdominally. A sub umbilical incision was made with a knife. Veress needle was introduced. Pneumoperitoneum was achieved with 4L of CO2 and then the trocar was introduced suprapubically.  Through this, the Enseal  was taken and then I proceeded to manipulate the uterus and cauterized the proximal end of the uterus.  Cauterization was continued along the mesosalpinx until the tube was removed. This was removed through the suprapubic port. This was done bilaterally and without any problem. Following this, both surgical sites were   noted to be hemostatic, so this completed the procedure. The instruments were removed. Pneumoperitoneum was allowed to resolve, and the abdominal incisions were closed with 3-0 monocryl.  The I proceeded to work vaginally by removing the vcare, uterus sounded to 8 cms, Cheri instrument placed, no samples or hysteroscopy done. Ablation done over 2 min, no complications.  . Vaginal instruments were removed. She was replaced to a supine position, allowed to awaken, and taken to the recovery room in stable condition.    Complications: None     Tomas Dawkins MD  2022  13:28 CST

## 2022-12-12 NOTE — ANESTHESIA POSTPROCEDURE EVALUATION
Patient: Yasmin Rosa    Procedure Summary     Date: 12/12/22 Room / Location:  [facility] OR  /  [facility]    Anesthesia Start: 1225 Anesthesia Stop: 1324    Procedure: LAPARSCOPIC BILATERAL SALPINGECTOMY, INOCENCIO  ABLATION (Vagina) Diagnosis: (MENORRHAGIA MENOPAUSAL, STERILIZATION)    Surgeons: Tomas Dawkins MD Provider: Timmy Gilbert CRNA    Anesthesia Type: general ASA Status: 2          Anesthesia Type: general    Vitals  Vitals Value Taken Time   BP 98/59 12/12/22 1415   Temp 97.4 °F (36.3 °C) 12/12/22 1400   Pulse 63 12/12/22 1415   Resp 16 12/12/22 1413   SpO2 95 % 12/12/22 1415           Post Anesthesia Care and Evaluation    Patient location during evaluation: PACU  Patient participation: complete - patient participated  Level of consciousness: awake and alert  Pain management: adequate    Airway patency: patent  Anesthetic complications: No anesthetic complications    Cardiovascular status: acceptable  Respiratory status: acceptable  Hydration status: acceptable    Comments: Blood pressure 98/59, pulse 63, temperature 97.4 °F (36.3 °C), temperature source Temporal, resp. rate 16, SpO2 95 %, not currently breastfeeding.    Pt discharged from PACU based on janusz score >8

## 2022-12-13 LAB
CYTO UR: NORMAL
LAB AP CASE REPORT: NORMAL
Lab: NORMAL
PATH REPORT.FINAL DX SPEC: NORMAL
PATH REPORT.GROSS SPEC: NORMAL

## 2022-12-26 ASSESSMENT — ENCOUNTER SYMPTOMS
RESPIRATORY NEGATIVE: 1
ALLERGIC/IMMUNOLOGIC NEGATIVE: 1
EYES NEGATIVE: 1
GASTROINTESTINAL NEGATIVE: 1

## 2023-01-09 DIAGNOSIS — B37.31 VAGINAL YEAST INFECTION: Primary | ICD-10-CM

## 2023-01-09 RX ORDER — FLUCONAZOLE 150 MG/1
150 TABLET ORAL
Qty: 2 TABLET | Refills: 0 | Status: SHIPPED | OUTPATIENT
Start: 2023-01-09 | End: 2023-01-15

## 2023-03-08 ENCOUNTER — PRE-ADMISSION TESTING (OUTPATIENT)
Dept: PREADMISSION TESTING | Facility: HOSPITAL | Age: 23
End: 2023-03-08
Payer: COMMERCIAL

## 2023-03-08 VITALS
RESPIRATION RATE: 20 BRPM | SYSTOLIC BLOOD PRESSURE: 138 MMHG | OXYGEN SATURATION: 99 % | WEIGHT: 234.57 LBS | HEART RATE: 84 BPM | HEIGHT: 64 IN | BODY MASS INDEX: 40.05 KG/M2 | DIASTOLIC BLOOD PRESSURE: 69 MMHG

## 2023-03-08 LAB
ALBUMIN SERPL-MCNC: 4.5 G/DL (ref 3.5–5.2)
ALBUMIN/GLOB SERPL: 1.7 G/DL
ALP SERPL-CCNC: 63 U/L (ref 39–117)
ALT SERPL W P-5'-P-CCNC: 35 U/L (ref 1–33)
ANION GAP SERPL CALCULATED.3IONS-SCNC: 9 MMOL/L (ref 5–15)
AST SERPL-CCNC: 18 U/L (ref 1–32)
BASOPHILS # BLD AUTO: 0.02 10*3/MM3 (ref 0–0.2)
BASOPHILS NFR BLD AUTO: 0.3 % (ref 0–1.5)
BILIRUB SERPL-MCNC: 1 MG/DL (ref 0–1.2)
BUN SERPL-MCNC: 9 MG/DL (ref 6–20)
BUN/CREAT SERPL: 17.6 (ref 7–25)
CALCIUM SPEC-SCNC: 9.2 MG/DL (ref 8.6–10.5)
CHLORIDE SERPL-SCNC: 103 MMOL/L (ref 98–107)
CO2 SERPL-SCNC: 25 MMOL/L (ref 22–29)
CREAT SERPL-MCNC: 0.51 MG/DL (ref 0.57–1)
DEPRECATED RDW RBC AUTO: 37.2 FL (ref 37–54)
EGFRCR SERPLBLD CKD-EPI 2021: 134.7 ML/MIN/1.73
EOSINOPHIL # BLD AUTO: 0.07 10*3/MM3 (ref 0–0.4)
EOSINOPHIL NFR BLD AUTO: 1.2 % (ref 0.3–6.2)
ERYTHROCYTE [DISTWIDTH] IN BLOOD BY AUTOMATED COUNT: 12.4 % (ref 12.3–15.4)
GLOBULIN UR ELPH-MCNC: 2.6 GM/DL
GLUCOSE SERPL-MCNC: 87 MG/DL (ref 65–99)
HCT VFR BLD AUTO: 40 % (ref 34–46.6)
HGB BLD-MCNC: 13 G/DL (ref 12–15.9)
IMM GRANULOCYTES # BLD AUTO: 0.02 10*3/MM3 (ref 0–0.05)
IMM GRANULOCYTES NFR BLD AUTO: 0.3 % (ref 0–0.5)
LYMPHOCYTES # BLD AUTO: 2.22 10*3/MM3 (ref 0.7–3.1)
LYMPHOCYTES NFR BLD AUTO: 37.9 % (ref 19.6–45.3)
MCH RBC QN AUTO: 26.7 PG (ref 26.6–33)
MCHC RBC AUTO-ENTMCNC: 32.5 G/DL (ref 31.5–35.7)
MCV RBC AUTO: 82.1 FL (ref 79–97)
MONOCYTES # BLD AUTO: 0.46 10*3/MM3 (ref 0.1–0.9)
MONOCYTES NFR BLD AUTO: 7.9 % (ref 5–12)
NEUTROPHILS NFR BLD AUTO: 3.06 10*3/MM3 (ref 1.7–7)
NEUTROPHILS NFR BLD AUTO: 52.4 % (ref 42.7–76)
NRBC BLD AUTO-RTO: 0 /100 WBC (ref 0–0.2)
PLATELET # BLD AUTO: 277 10*3/MM3 (ref 140–450)
PMV BLD AUTO: 9.4 FL (ref 6–12)
POTASSIUM SERPL-SCNC: 4 MMOL/L (ref 3.5–5.2)
PROT SERPL-MCNC: 7.1 G/DL (ref 6–8.5)
RBC # BLD AUTO: 4.87 10*6/MM3 (ref 3.77–5.28)
SODIUM SERPL-SCNC: 137 MMOL/L (ref 136–145)
WBC NRBC COR # BLD: 5.85 10*3/MM3 (ref 3.4–10.8)

## 2023-03-08 PROCEDURE — 85025 COMPLETE CBC W/AUTO DIFF WBC: CPT

## 2023-03-08 PROCEDURE — 80053 COMPREHEN METABOLIC PANEL: CPT

## 2023-03-08 PROCEDURE — 36415 COLL VENOUS BLD VENIPUNCTURE: CPT

## 2023-03-08 NOTE — DISCHARGE INSTRUCTIONS
Before you come to the hospital        Arrival time: AS DIRECTED BY OFFICE     YOU MAY TAKE THE FOLLOWING MEDICATION(S) THE MORNING OF SURGERY WITH A SIP OF WATER: NONE           ALL OTHER HOME MEDICATION CHECK WITH YOUR PHYSICIAN (especially if   you are taking diabetes medicines or blood thinners)        If you were given and instructed to use a germ- killing soap, use as directed the night before surgery and again the morning of surgery or as directed by your surgeon. (Use one-half of the bottle with each shower.)   See attached information for How to Use Chlorhexidine for Bathing if applicable.            Eating and drinking restrictions prior to scheduled arrival time    2 Hours before arrival time STOP   Drinking Clear liquids (water, apple juice-no pulp)     6 Hours before arrival time STOP   Milk or drinks that contain milk, full liquids    6 Hours before arrival time STOP   Light meals or foods, such as toast or cereal    8 Hours before arrival time STOP   Heavy foods, such as meat, fried foods, or fatty foods    (It is extremely important that you follow these guidelines to prevent delay or cancelation of your procedure)     Clear Liquids  Water and flavored water                                                                      Clear Fruit juices, such as cranberry juice and apple juice.  Black coffee (NO cream of any kind, including powdered).  Plain tea  Clear bouillon or broth.  Flavored gelatin.  Soda.  Gatorade or Powerade.  Full liquid examples  Juices that have pulp.  Frozen ice pops that contain fruit pieces.  Coffee with creamer  Milk.  Yogurt.                MANAGING PAIN AFTER SURGERY    We know you are probably wondering what your pain will be like after surgery.  Following surgery it is unrealistic to expect you will not have pain.   Pain is how our bodies let us know that something is wrong or cautions us to be careful.  That said, our goal is to make your pain tolerable.    Methods we  may use to treat your pain include (oral or IV medications, PCAs, epidurals, nerve blocks, etc.)   While some procedures require IV pain medications for a short time after surgery, transitioning to pain medications by mouth allows for better management of pain.   Your nurse will encourage you to take oral pain medications whenever possible.  IV medications work almost immediately, but only last a short while.  Taking medications by mouth allows for a more constant level of medication in your blood stream for a longer period of time.      Once your pain is out of control it is harder to get back under control.  It is important you are aware when your next dose of pain medication is due.  If you are admitted, your nurse may write the time of your next dose on the white board in your room to help you remember.      We are interested in your pain and encourage you to inform us about aggravating factors during your visit.   Many times a simple repositioning every few hours can make a big difference.    If your physician says it is okay, do not let your pain prevent you from getting out of bed. Be sure to call your nurse for assistance prior to getting up so you do not fall.      Before surgery, please decide your tolerable pain goal.  These faces help describe the pain ratings we use on a 0-10 scale.   Be prepared to tell us your goal and whether or not you take pain or anxiety medications at home.          Preparing for Surgery  Preparing for surgery is an important part of your care. It can make things go more smoothly and help you avoid complications. The steps leading up to surgery may vary among hospitals. Follow all instructions given to you by your health care providers. Ask questions if you do not understand something. Talk about any concerns that you have.  Here are some questions to consider asking before your surgery:  If my surgery is not an emergency (is elective), when would be the best time to have the  surgery?  What arrangements do I need to make for work, home, or school?  What will my recovery be like? How long will it be before I can return to normal activities?  Will I need to prepare my home? Will I need to arrange care for me or my children?  Should I expect to have pain after surgery? What are my pain management options? Are there nonmedical options that I can try for pain?  Tell a health care provider about:  Any allergies you have.  All medicines you are taking, including vitamins, herbs, eye drops, creams, and over-the-counter medicines.  Any problems you or family members have had with anesthetic medicines.  Any blood disorders you have.  Any surgeries you have had.  Any medical conditions you have.  Whether you are pregnant or may be pregnant.  What are the risks?  The risks and complications of surgery depend on the specific procedure that you have. Discuss all the risks with your health care providers before your surgery. Ask about common surgical complications, which may include:  Infection.  Bleeding or a need for blood replacement (transfusion).  Allergic reactions to medicines.  Damage to surrounding nerves, tissues, or structures.  A blood clot.  Scarring.  Failure of the surgery to correct the problem.  Follow these instructions before the procedure:  Several days or weeks before your procedure  You may have a physical exam by your primary health care provider to make sure it is safe for you to have surgery.  You may have testing. This may include a chest X-ray, blood and urine tests, electrocardiogram (ECG), or other testing.  Ask your health care provider about:  Changing or stopping your regular medicines. This is especially important if you are taking diabetes medicines or blood thinners.  Taking medicines such as aspirin and ibuprofen. These medicines can thin your blood. Do not take these medicines unless your health care provider tells you to take them.  Taking over-the-counter  medicines, vitamins, herbs, and supplements.  Do not use any products that contain nicotine or tobacco, such as cigarettes and e-cigarettes. If you need help quitting, ask your health care provider.  Avoid alcohol.  Ask your health care provider if there are exercises you can do to prepare for surgery.  Eat a healthy diet.   Plan to have someone take you home from the hospital or clinic.  Plan to have a responsible adult care for you for at least 24 hours after you leave the hospital or clinic. This is important.  The day before your procedure  You may be given antibiotic medicine to take by mouth to help prevent infection. Take it as told by your health care provider.  You may be asked to shower with a germ-killing soap.  Follow instructions from your health care provider about eating and drinking restrictions. This includes gum, mints and hard candy.  Pack comfortable clothes according to your procedure.   The day of your procedure  You may need to take another shower with a germ-killing soap before you leave home in the morning.  With a small sip of water, take only the medicines that you are told to take.  Remove all jewelry including rings.   Leave anything you consider valuable at home except hearing aids if needed.  You do not need to bring your home medications into the hospital.   Do not wear any makeup, nail polish, powder, deodorant, lotion, hair accessories, or anything on your skin or body except your clothes.  If you will be staying in the hospital, bring a case to hold your glasses, contacts, or dentures. You may also want to bring your robe and non-skid footwear.       (Do not use denture adhesives since you will be asked to remove them during  surgery).   If you wear oxygen at home, bring it with you the day of surgery.  If instructed by your health care provider, bring your sleep apnea device with you on the day of your surgery (if this applies to you).  You may want to leave your suitcase and  sleep apnea device in the car until after surgery.   Arrive at the hospital as scheduled.  Bring a friend or family member with you who can help to answer questions and be present while you meet with your health care provider.  At the hospital  When you arrive at the hospital:  Go to registration located at the main entrance of the hospital. You will be registered and given a beeper and a sticker sheet. Take the stickers to the Outpatient nurses desk and place in the black tray. This is to notify staff that you have arrived. Then return to the lobby to wait.   When your beeper lights up and vibrates proceed through the double doors, under the stairs, and a member of the Outpatient Surgery staff will escort you to your preoperative room.  You may have to wear compression sleeves. These help to prevent blood clots and reduce swelling in your legs.  An IV may be inserted into one of your veins.              In the operating room, you may be given one or more of the following:        A medicine to help you relax (sedative).        A medicine to numb the area (local anesthetic).        A medicine to make you fall asleep (general anesthetic).        A medicine that is injected into an area of your body to numb everything below the                      injection site (regional anesthetic).  You may be given an antibiotic through your IV to help prevent infection.  Your surgical site will be marked or identified.    Contact a health care provider if you:  Develop a fever of more than 100.4°F (38°C) or other feelings of illness during the 48 hours before your surgery.  Have symptoms that get worse.  Have questions or concerns about your surgery.  Summary  Preparing for surgery can make the procedure go more smoothly and lower your risk of complications.  Before surgery, make a list of questions and concerns to discuss with your surgeon. Ask about the risks and possible complications.  In the days or weeks before your  surgery, follow all instructions from your health care provider. You may need to stop smoking, avoid alcohol, follow eating restrictions, and change or stop your regular medicines.  Contact your surgeon if you develop a fever or other signs of illness during the few days before your surgery.  This information is not intended to replace advice given to you by your health care provider. Make sure you discuss any questions you have with your health care provider.  Document Revised: 12/21/2018 Document Reviewed: 10/23/2018  Elsevier Patient Education © 2021 [employer]

## 2023-03-10 ENCOUNTER — HOSPITAL ENCOUNTER (OUTPATIENT)
Facility: HOSPITAL | Age: 23
Discharge: HOME | End: 2023-03-11
Attending: OBSTETRICS & GYNECOLOGY | Admitting: OBSTETRICS & GYNECOLOGY
Payer: COMMERCIAL

## 2023-03-10 ENCOUNTER — ANESTHESIA EVENT (OUTPATIENT)
Dept: PERIOP | Facility: HOSPITAL | Age: 23
End: 2023-03-10
Payer: COMMERCIAL

## 2023-03-10 ENCOUNTER — ANESTHESIA (OUTPATIENT)
Dept: PERIOP | Facility: HOSPITAL | Age: 23
End: 2023-03-10
Payer: COMMERCIAL

## 2023-03-10 DIAGNOSIS — Z90.710 S/P HYSTERECTOMY: Primary | ICD-10-CM

## 2023-03-10 DIAGNOSIS — N92.4 PREMENOPAUSAL MENORRHAGIA: ICD-10-CM

## 2023-03-10 LAB — B-HCG UR QL: NEGATIVE

## 2023-03-10 PROCEDURE — 25010000002 FENTANYL CITRATE (PF) 100 MCG/2ML SOLUTION: Performed by: NURSE ANESTHETIST, CERTIFIED REGISTERED

## 2023-03-10 PROCEDURE — 88307 TISSUE EXAM BY PATHOLOGIST: CPT | Performed by: OBSTETRICS & GYNECOLOGY

## 2023-03-10 PROCEDURE — 81025 URINE PREGNANCY TEST: CPT | Performed by: OBSTETRICS & GYNECOLOGY

## 2023-03-10 PROCEDURE — 25010000002 DROPERIDOL PER 5 MG: Performed by: NURSE ANESTHETIST, CERTIFIED REGISTERED

## 2023-03-10 PROCEDURE — G0378 HOSPITAL OBSERVATION PER HR: HCPCS

## 2023-03-10 PROCEDURE — 25010000002 CEFAZOLIN PER 500 MG: Performed by: OBSTETRICS & GYNECOLOGY

## 2023-03-10 PROCEDURE — 25010000002 MIDAZOLAM PER 1 MG: Performed by: ANESTHESIOLOGY

## 2023-03-10 PROCEDURE — 25010000002 PROPOFOL 10 MG/ML EMULSION: Performed by: NURSE ANESTHETIST, CERTIFIED REGISTERED

## 2023-03-10 PROCEDURE — 25010000002 KETOROLAC TROMETHAMINE PER 15 MG: Performed by: OBSTETRICS & GYNECOLOGY

## 2023-03-10 RX ORDER — MORPHINE SULFATE 2 MG/ML
2 INJECTION, SOLUTION INTRAMUSCULAR; INTRAVENOUS EVERY 4 HOURS PRN
Status: DISCONTINUED | OUTPATIENT
Start: 2023-03-10 | End: 2023-03-11 | Stop reason: HOSPADM

## 2023-03-10 RX ORDER — PROPOFOL 10 MG/ML
VIAL (ML) INTRAVENOUS AS NEEDED
Status: DISCONTINUED | OUTPATIENT
Start: 2023-03-10 | End: 2023-03-10 | Stop reason: SURG

## 2023-03-10 RX ORDER — LIDOCAINE HYDROCHLORIDE 10 MG/ML
0.5 INJECTION, SOLUTION EPIDURAL; INFILTRATION; INTRACAUDAL; PERINEURAL ONCE AS NEEDED
Status: DISCONTINUED | OUTPATIENT
Start: 2023-03-10 | End: 2023-03-10 | Stop reason: HOSPADM

## 2023-03-10 RX ORDER — BUPIVACAINE HYDROCHLORIDE AND EPINEPHRINE 5; 5 MG/ML; UG/ML
INJECTION, SOLUTION EPIDURAL; INTRACAUDAL; PERINEURAL AS NEEDED
Status: DISCONTINUED | OUTPATIENT
Start: 2023-03-10 | End: 2023-03-10 | Stop reason: HOSPADM

## 2023-03-10 RX ORDER — NALOXONE HCL 0.4 MG/ML
0.4 VIAL (ML) INJECTION AS NEEDED
Status: DISCONTINUED | OUTPATIENT
Start: 2023-03-10 | End: 2023-03-10 | Stop reason: HOSPADM

## 2023-03-10 RX ORDER — SODIUM CHLORIDE 0.9 % (FLUSH) 0.9 %
3 SYRINGE (ML) INJECTION EVERY 12 HOURS SCHEDULED
Status: DISCONTINUED | OUTPATIENT
Start: 2023-03-10 | End: 2023-03-10 | Stop reason: HOSPADM

## 2023-03-10 RX ORDER — OXYCODONE AND ACETAMINOPHEN 10; 325 MG/1; MG/1
1 TABLET ORAL EVERY 4 HOURS PRN
Status: DISCONTINUED | OUTPATIENT
Start: 2023-03-10 | End: 2023-03-11 | Stop reason: HOSPADM

## 2023-03-10 RX ORDER — IBUPROFEN 600 MG/1
600 TABLET ORAL ONCE AS NEEDED
Status: DISCONTINUED | OUTPATIENT
Start: 2023-03-10 | End: 2023-03-10 | Stop reason: HOSPADM

## 2023-03-10 RX ORDER — FLUMAZENIL 0.1 MG/ML
0.2 INJECTION INTRAVENOUS AS NEEDED
Status: DISCONTINUED | OUTPATIENT
Start: 2023-03-10 | End: 2023-03-10 | Stop reason: HOSPADM

## 2023-03-10 RX ORDER — OXYCODONE AND ACETAMINOPHEN 7.5; 325 MG/1; MG/1
2 TABLET ORAL EVERY 4 HOURS PRN
Status: DISCONTINUED | OUTPATIENT
Start: 2023-03-10 | End: 2023-03-10 | Stop reason: HOSPADM

## 2023-03-10 RX ORDER — SODIUM CHLORIDE, SODIUM LACTATE, POTASSIUM CHLORIDE, CALCIUM CHLORIDE 600; 310; 30; 20 MG/100ML; MG/100ML; MG/100ML; MG/100ML
1000 INJECTION, SOLUTION INTRAVENOUS CONTINUOUS
Status: DISCONTINUED | OUTPATIENT
Start: 2023-03-10 | End: 2023-03-11 | Stop reason: HOSPADM

## 2023-03-10 RX ORDER — SODIUM CHLORIDE 0.9 % (FLUSH) 0.9 %
3-10 SYRINGE (ML) INJECTION AS NEEDED
Status: DISCONTINUED | OUTPATIENT
Start: 2023-03-10 | End: 2023-03-10 | Stop reason: HOSPADM

## 2023-03-10 RX ORDER — LABETALOL HYDROCHLORIDE 5 MG/ML
5 INJECTION, SOLUTION INTRAVENOUS
Status: DISCONTINUED | OUTPATIENT
Start: 2023-03-10 | End: 2023-03-10 | Stop reason: HOSPADM

## 2023-03-10 RX ORDER — LIDOCAINE 50 MG/G
PATCH TOPICAL AS NEEDED
Status: DISCONTINUED | OUTPATIENT
Start: 2023-03-10 | End: 2023-03-10 | Stop reason: HOSPADM

## 2023-03-10 RX ORDER — ONDANSETRON 2 MG/ML
4 INJECTION INTRAMUSCULAR; INTRAVENOUS ONCE AS NEEDED
Status: DISCONTINUED | OUTPATIENT
Start: 2023-03-10 | End: 2023-03-10

## 2023-03-10 RX ORDER — SODIUM CHLORIDE 0.9 % (FLUSH) 0.9 %
3 SYRINGE (ML) INJECTION AS NEEDED
Status: DISCONTINUED | OUTPATIENT
Start: 2023-03-10 | End: 2023-03-10 | Stop reason: HOSPADM

## 2023-03-10 RX ORDER — OXYCODONE AND ACETAMINOPHEN 10; 325 MG/1; MG/1
1 TABLET ORAL ONCE AS NEEDED
Status: DISCONTINUED | OUTPATIENT
Start: 2023-03-10 | End: 2023-03-10 | Stop reason: HOSPADM

## 2023-03-10 RX ORDER — ACETAMINOPHEN 500 MG
1000 TABLET ORAL ONCE
Status: COMPLETED | OUTPATIENT
Start: 2023-03-10 | End: 2023-03-10

## 2023-03-10 RX ORDER — DROPERIDOL 2.5 MG/ML
0.62 INJECTION, SOLUTION INTRAMUSCULAR; INTRAVENOUS ONCE AS NEEDED
Status: DISCONTINUED | OUTPATIENT
Start: 2023-03-10 | End: 2023-03-10 | Stop reason: HOSPADM

## 2023-03-10 RX ORDER — ROCURONIUM BROMIDE 10 MG/ML
INJECTION, SOLUTION INTRAVENOUS AS NEEDED
Status: DISCONTINUED | OUTPATIENT
Start: 2023-03-10 | End: 2023-03-10 | Stop reason: SURG

## 2023-03-10 RX ORDER — KETOROLAC TROMETHAMINE 30 MG/ML
30 INJECTION, SOLUTION INTRAMUSCULAR; INTRAVENOUS EVERY 6 HOURS
Status: DISCONTINUED | OUTPATIENT
Start: 2023-03-10 | End: 2023-03-11 | Stop reason: HOSPADM

## 2023-03-10 RX ORDER — SODIUM CHLORIDE, SODIUM LACTATE, POTASSIUM CHLORIDE, CALCIUM CHLORIDE 600; 310; 30; 20 MG/100ML; MG/100ML; MG/100ML; MG/100ML
100 INJECTION, SOLUTION INTRAVENOUS CONTINUOUS
Status: DISCONTINUED | OUTPATIENT
Start: 2023-03-10 | End: 2023-03-11 | Stop reason: HOSPADM

## 2023-03-10 RX ORDER — FENTANYL CITRATE 50 UG/ML
25 INJECTION, SOLUTION INTRAMUSCULAR; INTRAVENOUS
Status: DISCONTINUED | OUTPATIENT
Start: 2023-03-10 | End: 2023-03-10 | Stop reason: HOSPADM

## 2023-03-10 RX ORDER — MIDAZOLAM HYDROCHLORIDE 1 MG/ML
1 INJECTION INTRAMUSCULAR; INTRAVENOUS
Status: COMPLETED | OUTPATIENT
Start: 2023-03-10 | End: 2023-03-10

## 2023-03-10 RX ORDER — MAGNESIUM HYDROXIDE 1200 MG/15ML
LIQUID ORAL AS NEEDED
Status: DISCONTINUED | OUTPATIENT
Start: 2023-03-10 | End: 2023-03-10 | Stop reason: HOSPADM

## 2023-03-10 RX ORDER — NALOXONE HCL 0.4 MG/ML
0.4 VIAL (ML) INJECTION
Status: DISCONTINUED | OUTPATIENT
Start: 2023-03-10 | End: 2023-03-11 | Stop reason: HOSPADM

## 2023-03-10 RX ORDER — SCOLOPAMINE TRANSDERMAL SYSTEM 1 MG/1
PATCH, EXTENDED RELEASE TRANSDERMAL AS NEEDED
Status: DISCONTINUED | OUTPATIENT
Start: 2023-03-10 | End: 2023-03-10 | Stop reason: SURG

## 2023-03-10 RX ORDER — LIDOCAINE HYDROCHLORIDE 20 MG/ML
INJECTION, SOLUTION EPIDURAL; INFILTRATION; INTRACAUDAL; PERINEURAL AS NEEDED
Status: DISCONTINUED | OUTPATIENT
Start: 2023-03-10 | End: 2023-03-10 | Stop reason: SURG

## 2023-03-10 RX ORDER — FENTANYL CITRATE 50 UG/ML
INJECTION, SOLUTION INTRAMUSCULAR; INTRAVENOUS AS NEEDED
Status: DISCONTINUED | OUTPATIENT
Start: 2023-03-10 | End: 2023-03-10 | Stop reason: SURG

## 2023-03-10 RX ORDER — OXYCODONE HYDROCHLORIDE AND ACETAMINOPHEN 5; 325 MG/1; MG/1
1 TABLET ORAL EVERY 4 HOURS PRN
Status: DISCONTINUED | OUTPATIENT
Start: 2023-03-10 | End: 2023-03-11 | Stop reason: HOSPADM

## 2023-03-10 RX ORDER — BUPIVACAINE HCL/0.9 % NACL/PF 0.1 %
2 PLASTIC BAG, INJECTION (ML) EPIDURAL ONCE
Status: COMPLETED | OUTPATIENT
Start: 2023-03-10 | End: 2023-03-10

## 2023-03-10 RX ORDER — DROPERIDOL 2.5 MG/ML
INJECTION, SOLUTION INTRAMUSCULAR; INTRAVENOUS AS NEEDED
Status: DISCONTINUED | OUTPATIENT
Start: 2023-03-10 | End: 2023-03-10 | Stop reason: SURG

## 2023-03-10 RX ORDER — SODIUM CHLORIDE 9 MG/ML
40 INJECTION, SOLUTION INTRAVENOUS AS NEEDED
Status: DISCONTINUED | OUTPATIENT
Start: 2023-03-10 | End: 2023-03-10 | Stop reason: HOSPADM

## 2023-03-10 RX ORDER — NEOSTIGMINE METHYLSULFATE 5 MG/5 ML
SYRINGE (ML) INTRAVENOUS AS NEEDED
Status: DISCONTINUED | OUTPATIENT
Start: 2023-03-10 | End: 2023-03-10 | Stop reason: SURG

## 2023-03-10 RX ORDER — KETAMINE HCL IN NACL, ISO-OSM 100MG/10ML
SYRINGE (ML) INJECTION AS NEEDED
Status: DISCONTINUED | OUTPATIENT
Start: 2023-03-10 | End: 2023-03-10 | Stop reason: SURG

## 2023-03-10 RX ORDER — KETOROLAC TROMETHAMINE 30 MG/ML
INJECTION, SOLUTION INTRAMUSCULAR; INTRAVENOUS
Status: DISPENSED
Start: 2023-03-10 | End: 2023-03-11

## 2023-03-10 RX ADMIN — MIDAZOLAM HYDROCHLORIDE 1 MG: 2 INJECTION, SOLUTION INTRAMUSCULAR; INTRAVENOUS at 13:40

## 2023-03-10 RX ADMIN — PROPOFOL INJECTABLE EMULSION 30 MG: 10 INJECTION, EMULSION INTRAVENOUS at 15:43

## 2023-03-10 RX ADMIN — FENTANYL CITRATE 100 MCG: 50 INJECTION INTRAMUSCULAR; INTRAVENOUS at 15:42

## 2023-03-10 RX ADMIN — Medication 2 G: at 14:17

## 2023-03-10 RX ADMIN — Medication 4 MG: at 15:42

## 2023-03-10 RX ADMIN — MIDAZOLAM HYDROCHLORIDE 1 MG: 2 INJECTION, SOLUTION INTRAMUSCULAR; INTRAVENOUS at 13:58

## 2023-03-10 RX ADMIN — SODIUM CHLORIDE, POTASSIUM CHLORIDE, SODIUM LACTATE AND CALCIUM CHLORIDE 1000 ML: 600; 310; 30; 20 INJECTION, SOLUTION INTRAVENOUS at 13:21

## 2023-03-10 RX ADMIN — FENTANYL CITRATE 100 MCG: 50 INJECTION INTRAMUSCULAR; INTRAVENOUS at 14:17

## 2023-03-10 RX ADMIN — LIDOCAINE HYDROCHLORIDE 100 MG: 20 INJECTION, SOLUTION EPIDURAL; INFILTRATION; INTRACAUDAL; PERINEURAL at 14:20

## 2023-03-10 RX ADMIN — ACETAMINOPHEN 1000 MG: 500 TABLET, FILM COATED ORAL at 13:40

## 2023-03-10 RX ADMIN — PROPOFOL INJECTABLE EMULSION 100 MG: 10 INJECTION, EMULSION INTRAVENOUS at 14:21

## 2023-03-10 RX ADMIN — ROCURONIUM BROMIDE 50 MG: 10 INJECTION, SOLUTION INTRAVENOUS at 14:20

## 2023-03-10 RX ADMIN — PROPOFOL INJECTABLE EMULSION 200 MG: 10 INJECTION, EMULSION INTRAVENOUS at 14:20

## 2023-03-10 RX ADMIN — KETOROLAC TROMETHAMINE 30 MG: 30 INJECTION, SOLUTION INTRAMUSCULAR; INTRAVENOUS at 21:35

## 2023-03-10 RX ADMIN — FENTANYL CITRATE 100 MCG: 50 INJECTION INTRAMUSCULAR; INTRAVENOUS at 14:27

## 2023-03-10 RX ADMIN — Medication 50 MG: at 14:25

## 2023-03-10 RX ADMIN — DROPERIDOL 0.62 MG: 2.5 INJECTION, SOLUTION INTRAMUSCULAR; INTRAVENOUS at 15:05

## 2023-03-10 RX ADMIN — GLYCOPYRROLATE 0.4 MG: 0.2 INJECTION INTRAMUSCULAR; INTRAVENOUS at 15:42

## 2023-03-10 RX ADMIN — SCOPALAMINE 1 PATCH: 1 PATCH, EXTENDED RELEASE TRANSDERMAL at 15:20

## 2023-03-10 RX ADMIN — KETOROLAC TROMETHAMINE 30 MG: 30 INJECTION, SOLUTION INTRAMUSCULAR; INTRAVENOUS at 17:28

## 2023-03-10 NOTE — ANESTHESIA PREPROCEDURE EVALUATION
Anesthesia Evaluation     Patient summary reviewed   no history of anesthetic complications:  NPO Solid Status: > 8 hours  NPO Liquid Status: > 8 hours           Airway   Mallampati: I  TM distance: >3 FB  Neck ROM: full  No difficulty expected  Dental - normal exam     Pulmonary - negative pulmonary ROS   (-) not a smoker  Cardiovascular - negative cardio ROS  Exercise tolerance: good (4-7 METS)    (-) hypertension      Neuro/Psych- negative ROS  GI/Hepatic/Renal/Endo    (+) morbid obesity,      Musculoskeletal (-) negative ROS    Abdominal    Substance History      OB/GYN          Other                          Anesthesia Plan    ASA 2     general     intravenous induction     Anesthetic plan, risks, benefits, and alternatives have been provided, discussed and informed consent has been obtained with: patient.        CODE STATUS:

## 2023-03-10 NOTE — ANESTHESIA POSTPROCEDURE EVALUATION
Patient: Yasmin Rosa    Procedure Summary     Date: 03/10/23 Room / Location:  [facility] OR 04 /  [facility]    Anesthesia Start: 1417 Anesthesia Stop: 1555    Procedure: TOTAL LAPAROSCOPIC HYSTERECTOMY (Abdomen) Diagnosis: (MENORRHAGIA)    Surgeons: Tomas Dawkins MD Provider: MIKE Johnson CRNA    Anesthesia Type: general ASA Status: 2          Anesthesia Type: general    Vitals  Vitals Value Taken Time   /84 03/10/23 1642   Temp 98 °F (36.7 °C) 03/10/23 1630   Pulse 101 03/10/23 1646   Resp 14 03/10/23 1645   SpO2 95 % 03/10/23 1646   Vitals shown include unvalidated device data.        Post Anesthesia Care and Evaluation    Patient location during evaluation: PACU  Patient participation: complete - patient participated  Level of consciousness: awake and alert  Pain management: adequate    Airway patency: patent  Anesthetic complications: No anesthetic complications  PONV Status: none  Cardiovascular status: acceptable and hemodynamically stable  Respiratory status: acceptable  Hydration status: acceptable    Comments: Blood pressure 128/84, pulse 84, temperature 98 °F (36.7 °C), temperature source Temporal, resp. rate 14, SpO2 95 %, not currently breastfeeding.    Patient discharged from PACU based upon Leora score. Please see RN notes for further details       Male

## 2023-03-10 NOTE — OP NOTE
[facility]  Op Note: [facility]  Yasmin DUQUE Edmarleen  : 2000  MRN: [id number]  CSN: [id number]     Preoperative Dx: menorrhagia      Postoperative Dx: s/p [facility]     Procedure Preformed: TOTAL LAPAROSCOPIC HYSTERECTOMY (N/A)    Surgeon:  Tomas Dawkins MD    Anesthesia: General    Estimated Blood Loss: 200ml    Findings: uterus appears swollen , adenomyosis, ovaries nl, tubes missing, peritoneum nl    Description of Procedure: Patient was placed in the lithotomy position under general anesthesia.  She was prepped and draped in the usual manner.  The cervix was visualized and a cannula was placed for manipulation.  Then I began the abdominal portion of the procedure    Subumbilical incision was made with knife and a Veress needle was introduced, the pneumoperitoneum was created with 4 L of CO2 and a 5 mm trocar was introduced for the camera.  Then under direct visualization 2 suprapubic ports were placed.  The round ligaments were cauterized and transected creating the bladder flap.  The bladder was pushed off the cervix and uterus.  Then I proceeded to expose the mesosalpinx and cauterized along the tube until I reached the tubal ovarian ligament leaving the ovaries behind.  I continue posteriorly in the broad ligament until I reached the uterosacral ligament allowing the exposure of the uterine vessels.  The uterine vessels were cauterized bilaterally causing a color change of the uterus.    The bladder was confirmed to be away from the surgical site and a circumferential cut was made about the cervix  the uterus completely.  The uterus was then delivered vaginally.  I proceeded to work vaginally for the closure of the vaginal cuff.    After repositioning the patient and the peritoneum was identified and closed without any problem.  Then I proceeded to close the vaginal cuff using dissolvable suture.  The 2 layers were then tied together.  The Irwin bag was attached to the Irwin catheter confirming clear  urine.  This concluded the vaginal portion of the procedure and I returned to the abdomen to complete the operation.    The pneumoperitoneum was allowed to reaccumulate and under direct visualization I confirmed hemostasis this completed the procedure.  The abdominal incisions were closed using 3-0 monocryl  suture.  At the end of the procedure the needle and instrument and sponge counts were correct.  The EBL was 200 mls .  Patient was replaced to the supine position allowed to waken and taken to recovery room in a stable condition.    Complications: None     Tomas Dawkins MD  3/10/2023  16:30 CST

## 2023-03-10 NOTE — ANESTHESIA PROCEDURE NOTES
Airway  Urgency: elective    Date/Time: 3/10/2023 2:21 PM  Airway not difficult    General Information and Staff    Patient location during procedure: OR  CRNA/CAA: MIKE Johnson CRNA    Indications and Patient Condition  Indications for airway management: airway protection    Preoxygenated: yes  MILS maintained throughout  Mask difficulty assessment: 1 - vent by mask    Final Airway Details  Final airway type: endotracheal airway      Successful airway: ETT  Cuffed: yes   Successful intubation technique: direct laryngoscopy  Facilitating devices/methods: intubating stylet  Endotracheal tube insertion site: oral  Blade: Suazo  Blade size: 3  ETT size (mm): 7.5  Cormack-Lehane Classification: grade I - full view of glottis  Placement verified by: chest auscultation and capnometry   Cuff volume (mL): 5  Measured from: gums  Number of attempts at approach: 1  Assessment: lips, teeth, and gum same as pre-op and atraumatic intubation

## 2023-03-11 VITALS
SYSTOLIC BLOOD PRESSURE: 105 MMHG | DIASTOLIC BLOOD PRESSURE: 71 MMHG | TEMPERATURE: 98.2 F | OXYGEN SATURATION: 100 % | RESPIRATION RATE: 16 BRPM | HEART RATE: 118 BPM

## 2023-03-11 PROBLEM — N92.4 PREMENOPAUSAL MENORRHAGIA: Status: RESOLVED | Noted: 2023-03-10 | Resolved: 2023-03-11

## 2023-03-11 PROCEDURE — 25010000002 KETOROLAC TROMETHAMINE PER 15 MG: Performed by: OBSTETRICS & GYNECOLOGY

## 2023-03-11 PROCEDURE — G0378 HOSPITAL OBSERVATION PER HR: HCPCS

## 2023-03-11 RX ORDER — OXYCODONE HYDROCHLORIDE AND ACETAMINOPHEN 5; 325 MG/1; MG/1
1 TABLET ORAL EVERY 6 HOURS PRN
Qty: 20 TABLET | Refills: 0 | Status: SHIPPED | OUTPATIENT
Start: 2023-03-11

## 2023-03-11 RX ORDER — IBUPROFEN 800 MG/1
800 TABLET ORAL EVERY 8 HOURS PRN
Qty: 20 TABLET | Refills: 0 | Status: SHIPPED | OUTPATIENT
Start: 2023-03-11

## 2023-03-11 RX ADMIN — KETOROLAC TROMETHAMINE 30 MG: 30 INJECTION, SOLUTION INTRAMUSCULAR; INTRAVENOUS at 04:57

## 2023-03-11 RX ADMIN — OXYCODONE HYDROCHLORIDE AND ACETAMINOPHEN 1 TABLET: 5; 325 TABLET ORAL at 02:33

## 2023-03-11 NOTE — PLAN OF CARE
Problem: Adult Inpatient Plan of Care  Goal: Plan of Care Review  Outcome: Ongoing, Progressing  Flowsheets (Taken 3/11/2023 0614)  Progress: improving  Plan of Care Reviewed With: patient  Outcome Evaluation: VSS, lap sites clean, intact, scant marked drainage to inferior incision under lidocaine patch, both patches in places, no bleeding noted on maxi pad. Patient ambulating frequently to bathroom, voiding frequently. Pain managed with scheduled toradol and PRN oral pain meds.  Goal: Patient-Specific Goal (Individualized)  Outcome: Ongoing, Progressing  Goal: Absence of Hospital-Acquired Illness or Injury  Outcome: Ongoing, Progressing  Intervention: Identify and Manage Fall Risk  Recent Flowsheet Documentation  Taken 3/11/2023 0602 by Filomena Sorto RN  Safety Promotion/Fall Prevention: safety round/check completed  Taken 3/11/2023 0500 by Filomena Sorto RN  Safety Promotion/Fall Prevention: safety round/check completed  Taken 3/11/2023 0457 by Filomena Sorto RN  Safety Promotion/Fall Prevention: safety round/check completed  Taken 3/11/2023 0323 by Filomena Sorto RN  Safety Promotion/Fall Prevention: safety round/check completed  Taken 3/11/2023 0105 by Filomena Sorto RN  Safety Promotion/Fall Prevention: safety round/check completed  Taken 3/10/2023 2200 by Filomena Sorto RN  Safety Promotion/Fall Prevention: safety round/check completed  Taken 3/10/2023 2135 by Filomena Sorto RN  Safety Promotion/Fall Prevention:   safety round/check completed   nonskid shoes/slippers when out of bed  Taken 3/10/2023 2033 by Filomena Sorto RN  Safety Promotion/Fall Prevention: safety round/check completed  Intervention: Prevent Skin Injury  Recent Flowsheet Documentation  Taken 3/11/2023 0233 by Filomena Sorto RN  Body Position: position changed independently  Taken 3/10/2023 2135 by Filomena Sorto RN  Body Position: position changed independently  Intervention: Prevent and Manage VTE (Venous Thromboembolism) Risk  Recent Flowsheet Documentation  Taken  3/11/2023 0500 by Filomena Sorto RN  Activity Management:   ambulated in room   ambulated to bathroom  Taken 3/11/2023 0233 by Filomena Sorto RN  Activity Management:   ambulated in room   ambulated to bathroom  Taken 3/10/2023 2135 by Filomena Sorto RN  Activity Management:   ambulated to bathroom   ambulated in room  Taken 3/10/2023 2030 by Filomena Sorto RN  Activity Management: ambulated to bathroom  Goal: Optimal Comfort and Wellbeing  Outcome: Ongoing, Progressing  Intervention: Monitor Pain and Promote Comfort  Recent Flowsheet Documentation  Taken 3/11/2023 0457 by Filomena Sorto RN  Pain Management Interventions: see MAR  Taken 3/11/2023 0233 by Filomena Sorto RN  Pain Management Interventions: see MAR  Intervention: Provide Person-Centered Care  Recent Flowsheet Documentation  Taken 3/10/2023 2135 by Filomena Sorto RN  Trust Relationship/Rapport:   care explained   choices provided   reassurance provided   thoughts/feelings acknowledged  Goal: Readiness for Transition of Care  Outcome: Ongoing, Progressing     Problem: Bleeding (Surgery Nonspecified)  Goal: Absence of Bleeding  Outcome: Ongoing, Progressing     Problem: Bowel Motility Impaired (Surgery Nonspecified)  Goal: Effective Bowel Elimination  Outcome: Ongoing, Progressing     Problem: Fluid and Electrolyte Imbalance (Surgery Nonspecified)  Goal: Fluid and Electrolyte Balance  Outcome: Ongoing, Progressing     Problem: Glycemic Control Impaired (Surgery Nonspecified)  Goal: Blood Glucose Level Within Targeted Range  Outcome: Ongoing, Progressing     Problem: Infection (Surgery Nonspecified)  Goal: Absence of Infection Signs and Symptoms  Outcome: Ongoing, Progressing     Problem: Ongoing Anesthesia Effects (Surgery Nonspecified)  Goal: Anesthesia/Sedation Recovery  Outcome: Ongoing, Progressing  Intervention: Optimize Anesthesia Recovery  Recent Flowsheet Documentation  Taken 3/11/2023 0602 by Filomena Sorto, JASMINA  Safety Promotion/Fall Prevention: safety round/check  completed  Taken 3/11/2023 0500 by Filomena Sorto RN  Safety Promotion/Fall Prevention: safety round/check completed  Taken 3/11/2023 0457 by Filomena Sorto RN  Safety Promotion/Fall Prevention: safety round/check completed  Taken 3/11/2023 0323 by Filomena Sorto RN  Safety Promotion/Fall Prevention: safety round/check completed  Taken 3/11/2023 0105 by Filomena Sorto RN  Safety Promotion/Fall Prevention: safety round/check completed  Taken 3/10/2023 2200 by Filomena Sorto RN  Safety Promotion/Fall Prevention: safety round/check completed  Taken 3/10/2023 2135 by Filomena Sorto RN  Safety Promotion/Fall Prevention:   safety round/check completed   nonskid shoes/slippers when out of bed  Taken 3/10/2023 2033 by Filomena Sorto RN  Safety Promotion/Fall Prevention: safety round/check completed     Problem: Pain (Surgery Nonspecified)  Goal: Acceptable Pain Control  Outcome: Ongoing, Progressing  Intervention: Prevent or Manage Pain  Recent Flowsheet Documentation  Taken 3/11/2023 0457 by Filomena Sorto RN  Pain Management Interventions: see MAR  Taken 3/11/2023 0233 by Filomena Sorto RN  Pain Management Interventions: see MAR     Problem: Postoperative Nausea and Vomiting (Surgery Nonspecified)  Goal: Nausea and Vomiting Relief  Outcome: Ongoing, Progressing     Problem: Postoperative Urinary Retention (Surgery Nonspecified)  Goal: Effective Urinary Elimination  Outcome: Ongoing, Progressing     Problem: Respiratory Compromise (Surgery Nonspecified)  Goal: Effective Oxygenation and Ventilation  Outcome: Ongoing, Progressing   Goal Outcome Evaluation:  Plan of Care Reviewed With: patient        Progress: improving  Outcome Evaluation: VSS, lap sites clean, intact, scant marked drainage to inferior incision under lidocaine patch, both patches in places, no bleeding noted on maxi pad. Patient ambulating frequently to bathroom, voiding frequently. Pain managed with scheduled toradol and PRN oral pain meds.

## 2023-03-11 NOTE — PLAN OF CARE
Goal Outcome Evaluation:      VSS. Ambulated x1. Irwin out at 1805. Due to void. Lidocaine patches on x2. Scheduled IV Toradol given for pain. No drainage noted. Spouse at bedside, attentive to patient.

## 2023-03-23 ENCOUNTER — OFFICE VISIT (OUTPATIENT)
Age: 23
End: 2023-03-23
Payer: MEDICAID

## 2023-03-23 VITALS
WEIGHT: 229 LBS | SYSTOLIC BLOOD PRESSURE: 122 MMHG | HEIGHT: 63 IN | DIASTOLIC BLOOD PRESSURE: 78 MMHG | OXYGEN SATURATION: 99 % | TEMPERATURE: 97.8 F | RESPIRATION RATE: 19 BRPM | HEART RATE: 100 BPM | BODY MASS INDEX: 40.57 KG/M2

## 2023-03-23 DIAGNOSIS — J02.0 STREP PHARYNGITIS: Primary | ICD-10-CM

## 2023-03-23 LAB — S PYO AG THROAT QL: POSITIVE

## 2023-03-23 PROCEDURE — G8484 FLU IMMUNIZE NO ADMIN: HCPCS | Performed by: NURSE PRACTITIONER

## 2023-03-23 PROCEDURE — 1036F TOBACCO NON-USER: CPT | Performed by: NURSE PRACTITIONER

## 2023-03-23 PROCEDURE — G8417 CALC BMI ABV UP PARAM F/U: HCPCS | Performed by: NURSE PRACTITIONER

## 2023-03-23 PROCEDURE — G8427 DOCREV CUR MEDS BY ELIG CLIN: HCPCS | Performed by: NURSE PRACTITIONER

## 2023-03-23 PROCEDURE — 99213 OFFICE O/P EST LOW 20 MIN: CPT | Performed by: NURSE PRACTITIONER

## 2023-03-23 PROCEDURE — 87880 STREP A ASSAY W/OPTIC: CPT | Performed by: NURSE PRACTITIONER

## 2023-03-23 RX ORDER — AMOXICILLIN 500 MG/1
500 CAPSULE ORAL 2 TIMES DAILY
Qty: 20 CAPSULE | Refills: 0 | Status: SHIPPED | OUTPATIENT
Start: 2023-03-23 | End: 2023-04-02

## 2023-03-23 ASSESSMENT — ENCOUNTER SYMPTOMS
GASTROINTESTINAL NEGATIVE: 1
ALLERGIC/IMMUNOLOGIC NEGATIVE: 1
SORE THROAT: 1
RESPIRATORY NEGATIVE: 1
EYES NEGATIVE: 1

## 2023-03-23 NOTE — LETTER
March 23, 2023       Jeannie Buitrago YOB: 2000   Skinny Pineda Date of Visit:  3/23/2023       To Whom It May Concern:    Jeannie Buitrago was seen in my clinic on 3/23/2023. Please excuse her from work today and she may return tomorrow 03/24/2023 . If you have any questions or concerns, please don't hesitate to call.     Sincerely,        Rae Thornton, VELMA - CNP

## 2023-03-23 NOTE — PROGRESS NOTES
Tonsillar exudate present. 2+ on the right. 2+ on the left. Cardiovascular:      Rate and Rhythm: Normal rate and regular rhythm. Heart sounds: Normal heart sounds. Pulmonary:      Effort: Pulmonary effort is normal.      Breath sounds: Normal breath sounds. Lymphadenopathy:      Head:      Right side of head: Tonsillar adenopathy present. No submandibular adenopathy. Left side of head: Tonsillar adenopathy present. No submandibular adenopathy. Cervical:      Right cervical: No superficial cervical adenopathy. Left cervical: No superficial cervical adenopathy. Skin:     General: Skin is warm and dry. Neurological:      Mental Status: She is alert. Patient Instructions     Completely finish antibiotic. For females that are childbearing age: Antibiotics may decrease the effectiveness of your birth control. Be sure to use another form of birth control for the next 2 weeks. You are contagious for at least 24 hours after beginning antibiotics. Change toothbrush in 48 hours, and again in 1 week. No eating or drinking after others. Warm salt water gargles several times daily. Coolmist humidifier in bedroom. Tylenol or Motrin as needed. An electronic signature was used to authenticate this note. --Darreld Primrose, APRN - CNP     EMR Dragon/translation disclaimer: Much of this encounter note is an electronic transcription/translation of spoken language to printed text. The electronic translation of spoken language may be erroneous, or at times, nonsensical words or phrases may be inadvertently transcribed.   Although I have reviewed the note for such errors, some may still exist.

## 2023-03-24 NOTE — DISCHARGE SUMMARY
Connie DUQUE Edging  : 2000  MRN: [id number]  CSN: [id number]    Discharge Summary      Date of Admission: 3/10/2023   Date of Discharge: 3/11/2023   Discharge Diagnosis: 1. S/p tlh    Procedures Performed: Procedure(s):  TOTAL LAPAROSCOPIC HYSTERECTOMY      Consults: path   Brief History: Patient is a 22 y.o. who presented pelvic pain , menorrhagia, failed ablation .   Hospital Course: Pt underwent a tlh    Pending Studies: Path    Condition at discharge: rapidly improving   Discharge Medications:    Your medication list      START taking these medications      Instructions Last Dose Given Next Dose Due   ibuprofen 800 MG tablet  Commonly known as: ADVIL,MOTRIN      Take 1 tablet by mouth Every 8 (Eight) Hours As Needed for Moderate Pain.       oxyCODONE-acetaminophen 5-325 MG per tablet  Commonly known as: Percocet      Take 1 tablet by mouth Every 6 (Six) Hours As Needed for Severe Pain for up to 20 doses.             Where to Get Your Medications      These medications were sent to [facility]/pharmacy #[id number] - [place], [place] - [address] AT ACROSS FROM [address]EEW[address] BAZAN - [phone] [facility] [phone]   [address] [place] [address]    Phone: [phone] ·   ibuprofen 800 MG tablet  · oxyCODONE-acetaminophen 5-325 MG per tablet        Discharge Disposition: home   Follow-up: One week at my office         This note has been electronically signed.    Tomas Dawkins MD  2023

## 2023-08-22 ENCOUNTER — HOSPITAL ENCOUNTER (EMERGENCY)
Age: 23
Discharge: HOME | End: 2023-08-22
Payer: MEDICAID

## 2023-08-22 VITALS
OXYGEN SATURATION: 98 % | RESPIRATION RATE: 16 BRPM | DIASTOLIC BLOOD PRESSURE: 81 MMHG | SYSTOLIC BLOOD PRESSURE: 118 MMHG | WEIGHT: 210 LBS | HEART RATE: 77 BPM | TEMPERATURE: 98 F | BODY MASS INDEX: 37.2 KG/M2

## 2023-08-22 DIAGNOSIS — N30.01 ACUTE CYSTITIS WITH HEMATURIA: Primary | ICD-10-CM

## 2023-08-22 LAB
BACTERIA #/AREA URNS HPF: ABNORMAL /HPF
BILIRUB UR QL STRIP: NEGATIVE
CLARITY UR: ABNORMAL
COLOR UR: YELLOW
GLUCOSE UR STRIP.AUTO-MCNC: NEGATIVE MG/DL
HCG UR QL: NEGATIVE
HGB UR STRIP.AUTO-MCNC: ABNORMAL MG/L
KETONES UR STRIP.AUTO-MCNC: ABNORMAL MG/DL
LEUKOCYTE ESTERASE UR QL STRIP.AUTO: ABNORMAL
NITRITE UR QL STRIP.AUTO: POSITIVE
PH UR STRIP.AUTO: 6 [PH] (ref 5–8)
PROT UR STRIP.AUTO-MCNC: 300 MG/DL
SP GR UR STRIP.AUTO: 1.02 (ref 1–1.03)
SQUAMOUS #/AREA URNS HPF: ABNORMAL /HPF
UROBILINOGEN UR STRIP.AUTO-MCNC: 0.2 E.U./DL
WBC #/AREA URNS HPF: ABNORMAL /HPF (ref 0–5)
YEAST #/AREA URNS HPF: PRESENT /HPF

## 2023-08-22 PROCEDURE — 99283 EMERGENCY DEPT VISIT LOW MDM: CPT

## 2023-08-22 PROCEDURE — 81001 URINALYSIS AUTO W/SCOPE: CPT

## 2023-08-22 PROCEDURE — 87086 URINE CULTURE/COLONY COUNT: CPT

## 2023-08-22 PROCEDURE — 84703 CHORIONIC GONADOTROPIN ASSAY: CPT

## 2023-08-22 PROCEDURE — 87186 SC STD MICRODIL/AGAR DIL: CPT

## 2023-08-22 RX ORDER — CEFDINIR 300 MG/1
300 CAPSULE ORAL 2 TIMES DAILY
Qty: 20 CAPSULE | Refills: 0 | Status: SHIPPED | OUTPATIENT
Start: 2023-08-22 | End: 2023-09-01

## 2023-08-22 ASSESSMENT — ENCOUNTER SYMPTOMS
BOWEL INCONTINENCE: 0
ABDOMINAL PAIN: 0
BACK PAIN: 1
VOMITING: 0
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
## 2023-08-24 LAB
BACTERIA UR CULT: ABNORMAL
BACTERIA UR CULT: ABNORMAL
ORGANISM: ABNORMAL

## 2023-09-07 ENCOUNTER — OFFICE VISIT (OUTPATIENT)
Age: 23
End: 2023-09-07
Payer: MEDICAID

## 2023-09-07 VITALS
TEMPERATURE: 98.1 F | HEART RATE: 82 BPM | RESPIRATION RATE: 20 BRPM | SYSTOLIC BLOOD PRESSURE: 120 MMHG | DIASTOLIC BLOOD PRESSURE: 82 MMHG | WEIGHT: 209 LBS | OXYGEN SATURATION: 98 % | BODY MASS INDEX: 37.02 KG/M2

## 2023-09-07 DIAGNOSIS — J02.0 STREP PHARYNGITIS: Primary | ICD-10-CM

## 2023-09-07 LAB — S PYO AG THROAT QL: POSITIVE

## 2023-09-07 PROCEDURE — G8427 DOCREV CUR MEDS BY ELIG CLIN: HCPCS | Performed by: NURSE PRACTITIONER

## 2023-09-07 PROCEDURE — G8417 CALC BMI ABV UP PARAM F/U: HCPCS | Performed by: NURSE PRACTITIONER

## 2023-09-07 PROCEDURE — 99213 OFFICE O/P EST LOW 20 MIN: CPT | Performed by: NURSE PRACTITIONER

## 2023-09-07 PROCEDURE — 1036F TOBACCO NON-USER: CPT | Performed by: NURSE PRACTITIONER

## 2023-09-07 RX ORDER — CEPHALEXIN 500 MG/1
500 CAPSULE ORAL 2 TIMES DAILY
Qty: 20 CAPSULE | Refills: 0 | Status: SHIPPED | OUTPATIENT
Start: 2023-09-07 | End: 2023-09-17

## 2023-09-07 ASSESSMENT — ENCOUNTER SYMPTOMS
ALLERGIC/IMMUNOLOGIC NEGATIVE: 1
DIARRHEA: 1
EYES NEGATIVE: 1
RESPIRATORY NEGATIVE: 1
VOMITING: 1
NAUSEA: 1
SORE THROAT: 1
ABDOMINAL PAIN: 0

## 2023-09-07 NOTE — PROGRESS NOTES
Hematological: Negative. Psychiatric/Behavioral: Negative. Objective   Physical Exam  Vitals reviewed. HENT:      Right Ear: Tympanic membrane, ear canal and external ear normal.      Left Ear: Tympanic membrane, ear canal and external ear normal.      Nose:      Right Sinus: No maxillary sinus tenderness or frontal sinus tenderness. Left Sinus: No maxillary sinus tenderness or frontal sinus tenderness. Mouth/Throat:      Lips: Pink. Mouth: Mucous membranes are moist.      Pharynx: Posterior oropharyngeal erythema present. No oropharyngeal exudate. Tonsils: 2+ on the right. 2+ on the left. Cardiovascular:      Rate and Rhythm: Normal rate and regular rhythm. Heart sounds: Normal heart sounds. Pulmonary:      Effort: Pulmonary effort is normal.      Breath sounds: Normal breath sounds. Lymphadenopathy:      Head:      Right side of head: No submandibular or tonsillar adenopathy. Left side of head: No submandibular or tonsillar adenopathy. Cervical:      Right cervical: No superficial cervical adenopathy. Left cervical: No superficial cervical adenopathy. Skin:     General: Skin is warm and dry. Neurological:      Mental Status: She is alert. Patient Instructions     Completely finish antibiotic. For females that are childbearing age: Antibiotics may decrease the effectiveness of your birth control. Be sure to use another form of birth control for the next 2 weeks. You are contagious for at least 24 hours after beginning antibiotics. Change toothbrush in 48 hours, and again in 1 week. No eating or drinking after others. Warm salt water gargles several times daily. Coolmist humidifier in bedroom. Tylenol or Motrin as needed. An electronic signature was used to authenticate this note.     --VELMA Dean - CNP     EMR Dragon/translation disclaimer: Much of this encounter note is an electronic

## 2023-10-14 ENCOUNTER — HOSPITAL ENCOUNTER (OUTPATIENT)
Dept: GENERAL RADIOLOGY | Facility: HOSPITAL | Age: 23
Discharge: HOME | End: 2023-10-14
Payer: COMMERCIAL

## 2023-10-14 PROCEDURE — 87077 CULTURE AEROBIC IDENTIFY: CPT | Performed by: NURSE PRACTITIONER

## 2023-10-14 PROCEDURE — 74018 RADEX ABDOMEN 1 VIEW: CPT

## 2023-10-14 PROCEDURE — 87186 SC STD MICRODIL/AGAR DIL: CPT | Performed by: NURSE PRACTITIONER

## 2023-10-14 PROCEDURE — 87086 URINE CULTURE/COLONY COUNT: CPT | Performed by: NURSE PRACTITIONER

## 2023-10-17 ENCOUNTER — TELEPHONE (OUTPATIENT)
Dept: PEDIATRICS | Facility: CLINIC | Age: 23
End: 2023-10-17
Payer: COMMERCIAL

## 2023-10-17 NOTE — TELEPHONE ENCOUNTER
----- Message from KELSIE Cordoba sent at 10/17/2023  6:47 AM CDT -----  Pos e coli continue antibiotics

## 2023-10-19 ENCOUNTER — TELEPHONE (OUTPATIENT)
Dept: FAMILY MEDICINE CLINIC | Facility: CLINIC | Age: 23
End: 2023-10-19
Payer: COMMERCIAL

## 2023-10-19 ENCOUNTER — TELEPHONE (OUTPATIENT)
Dept: URGENT CARE | Facility: CLINIC | Age: 23
End: 2023-10-19
Payer: COMMERCIAL

## 2023-10-19 DIAGNOSIS — N39.0 ACUTE UTI: Primary | ICD-10-CM

## 2023-10-19 RX ORDER — CEFDINIR 300 MG/1
300 CAPSULE ORAL 2 TIMES DAILY
Qty: 14 CAPSULE | Refills: 0 | Status: SHIPPED | OUTPATIENT
Start: 2023-10-19 | End: 2023-10-26

## 2023-10-19 NOTE — TELEPHONE ENCOUNTER
Attempted to call patient about [facility]. She was recently seen in the ER and may need a PCP.

## 2024-01-05 ENCOUNTER — APPOINTMENT (OUTPATIENT)
Dept: GENERAL RADIOLOGY | Age: 24
End: 2024-01-05
Payer: MEDICAID

## 2024-01-05 ENCOUNTER — HOSPITAL ENCOUNTER (EMERGENCY)
Age: 24
Discharge: HOME | End: 2024-01-05
Payer: MEDICAID

## 2024-01-05 VITALS
SYSTOLIC BLOOD PRESSURE: 126 MMHG | TEMPERATURE: 97.8 F | RESPIRATION RATE: 20 BRPM | WEIGHT: 205 LBS | DIASTOLIC BLOOD PRESSURE: 80 MMHG | OXYGEN SATURATION: 97 % | HEART RATE: 84 BPM | BODY MASS INDEX: 37.73 KG/M2 | HEIGHT: 62 IN

## 2024-01-05 DIAGNOSIS — M25.571 ACUTE RIGHT ANKLE PAIN: Primary | ICD-10-CM

## 2024-01-05 PROCEDURE — 73610 X-RAY EXAM OF ANKLE: CPT

## 2024-01-05 PROCEDURE — 73630 X-RAY EXAM OF FOOT: CPT

## 2024-01-05 PROCEDURE — 99283 EMERGENCY DEPT VISIT LOW MDM: CPT

## 2024-01-05 RX ORDER — NAPROXEN 500 MG/1
500 TABLET ORAL 2 TIMES DAILY PRN
Qty: 14 TABLET | Refills: 0 | Status: SHIPPED | OUTPATIENT
Start: 2024-01-05 | End: 2024-01-12

## 2024-01-05 ASSESSMENT — ENCOUNTER SYMPTOMS
SHORTNESS OF BREATH: 0
ABDOMINAL PAIN: 0
VOMITING: 0
BACK PAIN: 0
COUGH: 0
PHOTOPHOBIA: 0
NAUSEA: 0
CHEST TIGHTNESS: 0

## 2024-01-05 NOTE — ED PROVIDER NOTES
[facility] EMERGENCY DEPT  EMERGENCY DEPARTMENT ENCOUNTER      Pt Name: Carmel Juarez  MRN: [id number]  Birthdate 2000  Date of evaluation: 1/5/2024  Provider: VELMA Can CNP  5:02 PM    CHIEF COMPLAINT       Chief Complaint   Patient presents with    Foot Injury     Fall from 7 feet up ladder, landed on right leg.  Right foot and ankle pain and swelling         HISTORY OF PRESENT ILLNESS    Carmel Juarez is a 23 y.o. female who presents to the emergency department with concern for right foot and ankle pain after falling, climbing down from a loft on a ladder. She did not hit head. No LOC. No headache or neck pain. No chest or back pain. No abd pain. No hip or pelvic pain. C/o pain to right medial foot and right lateral ankle. She does have a scrape on her right palm. Here tetanus is up to date.     Denies possibility of pregnancy    HPI    Nursing Notes were reviewed.    REVIEW OF SYSTEMS       Review of Systems   Constitutional:  Negative for chills and fever.   Eyes:  Negative for photophobia and visual disturbance.   Respiratory:  Negative for cough, chest tightness and shortness of breath.    Cardiovascular:  Negative for chest pain, palpitations and leg swelling.   Gastrointestinal:  Negative for abdominal pain, nausea and vomiting.   Genitourinary:  Negative for dysuria, flank pain, frequency and urgency.   Musculoskeletal:  Positive for joint swelling (right ankle). Negative for back pain and neck pain.   Skin:  Positive for wound (right palm abrasion).   Neurological:  Negative for dizziness, syncope, weakness, light-headedness and headaches.       Except as noted above the remainder of the review of systems was reviewed and negative.       PAST MEDICAL HISTORY     Past Medical History:   Diagnosis Date    Abnormal Pap smear of cervix     Chlamydia     Depression     Diabetes mellitus (HCC)     gestational, last pregnancy    Ectopic pregnancy     Gestational diabetes mellitus     History of blood

## 2024-02-14 ENCOUNTER — OFFICE VISIT (OUTPATIENT)
Dept: FAMILY MEDICINE CLINIC | Facility: CLINIC | Age: 24
End: 2024-02-14
Payer: COMMERCIAL

## 2024-02-14 VITALS
SYSTOLIC BLOOD PRESSURE: 118 MMHG | BODY MASS INDEX: 39.01 KG/M2 | HEART RATE: 82 BPM | TEMPERATURE: 98.5 F | WEIGHT: 212 LBS | HEIGHT: 62 IN | OXYGEN SATURATION: 99 % | RESPIRATION RATE: 18 BRPM | DIASTOLIC BLOOD PRESSURE: 78 MMHG

## 2024-02-14 DIAGNOSIS — M54.50 CHRONIC MIDLINE LOW BACK PAIN WITHOUT SCIATICA: Primary | ICD-10-CM

## 2024-02-14 DIAGNOSIS — G89.29 CHRONIC MIDLINE LOW BACK PAIN WITHOUT SCIATICA: Primary | ICD-10-CM

## 2024-02-14 DIAGNOSIS — F41.9 ANXIETY: ICD-10-CM

## 2024-02-14 RX ORDER — ESCITALOPRAM OXALATE 10 MG/1
10 TABLET ORAL DAILY
Qty: 90 TABLET | Refills: 1 | Status: SHIPPED | OUTPATIENT
Start: 2024-02-14

## 2024-02-14 RX ORDER — NAPROXEN 500 MG/1
500 TABLET ORAL 2 TIMES DAILY WITH MEALS
Qty: 60 TABLET | Refills: 0 | Status: SHIPPED | OUTPATIENT
Start: 2024-02-14

## 2024-02-15 RX ORDER — CYCLOBENZAPRINE HCL 5 MG
5 TABLET ORAL 3 TIMES DAILY PRN
Qty: 90 TABLET | Refills: 0 | Status: SHIPPED | OUTPATIENT
Start: 2024-02-15

## 2024-02-27 ENCOUNTER — TELEPHONE (OUTPATIENT)
Dept: FAMILY MEDICINE CLINIC | Facility: CLINIC | Age: 24
End: 2024-02-27
Payer: COMMERCIAL

## 2024-02-27 NOTE — TELEPHONE ENCOUNTER
Called pt back to follow up- naproxen made her sweat caused stomach pain (states that ibuprofen causes the same issues)  Lexapro made her really dizzy and hot (red face), had  fever 101/103- reports that she was seen in ED but was negative for every thing- but not other symptoms.   Pt reports that she has discontinued medication.   Will request records from [facility] ED and have provider review then follow up with pt.   Pt verbalized understanding.

## 2024-02-27 NOTE — TELEPHONE ENCOUNTER
Caller: Yasmin Rosa    Relationship: Self    Best call back number: [phone]      What is the best time to reach you: SOON PLEASE     Who are you requesting to speak with (clinical staff, provider,  specific staff member): PROVIDER OR CLINICAL STAFF     What was the call regarding: PATIENT REQUESTING A CALL BACK TO DISCUSS HAVING TO STOP TAKING BOTH THE   escitalopram (Lexapro) 10 MG tablet   AND naproxen (Naprosyn) 500 MG tablet   DUE TO THE SIDE EFFECTS     Is it okay if the provider responds through MyChart: PREFERS A CALL BACK

## 2024-03-13 DIAGNOSIS — M54.50 CHRONIC MIDLINE LOW BACK PAIN WITHOUT SCIATICA: ICD-10-CM

## 2024-03-13 DIAGNOSIS — G89.29 CHRONIC MIDLINE LOW BACK PAIN WITHOUT SCIATICA: ICD-10-CM

## 2024-03-13 RX ORDER — NAPROXEN 500 MG/1
500 TABLET ORAL 2 TIMES DAILY WITH MEALS
Qty: 60 TABLET | Refills: 0 | OUTPATIENT
Start: 2024-03-13 | End: 2024-03-15

## 2024-03-15 ENCOUNTER — APPOINTMENT (OUTPATIENT)
Dept: GENERAL RADIOLOGY | Facility: HOSPITAL | Age: 24
End: 2024-03-15
Payer: COMMERCIAL

## 2024-03-15 ENCOUNTER — HOSPITAL ENCOUNTER (EMERGENCY)
Facility: HOSPITAL | Age: 24
Discharge: HOME | End: 2024-03-15
Payer: COMMERCIAL

## 2024-03-15 ENCOUNTER — APPOINTMENT (OUTPATIENT)
Dept: CT IMAGING | Facility: HOSPITAL | Age: 24
End: 2024-03-15
Payer: COMMERCIAL

## 2024-03-15 VITALS
DIASTOLIC BLOOD PRESSURE: 82 MMHG | SYSTOLIC BLOOD PRESSURE: 124 MMHG | HEART RATE: 62 BPM | HEIGHT: 62 IN | OXYGEN SATURATION: 100 % | WEIGHT: 212 LBS | BODY MASS INDEX: 39.01 KG/M2 | RESPIRATION RATE: 18 BRPM | TEMPERATURE: 97.8 F
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
## 2024-03-15 DIAGNOSIS — S80.812A ABRASION OF LEFT LOWER EXTREMITY, INITIAL ENCOUNTER: ICD-10-CM

## 2024-03-15 DIAGNOSIS — S16.1XXA STRAIN OF NECK MUSCLE, INITIAL ENCOUNTER: Primary | ICD-10-CM

## 2024-03-15 DIAGNOSIS — S80.12XA CONTUSION OF LEFT LOWER EXTREMITY, INITIAL ENCOUNTER: ICD-10-CM

## 2024-03-15 DIAGNOSIS — S39.012A STRAIN OF LUMBAR REGION, INITIAL ENCOUNTER: ICD-10-CM

## 2024-03-15 DIAGNOSIS — S29.019A THORACIC MYOFASCIAL STRAIN, INITIAL ENCOUNTER: ICD-10-CM

## 2024-03-15 DIAGNOSIS — R93.89 ABNORMAL CT SCAN: ICD-10-CM

## 2024-03-15 PROCEDURE — 72131 CT LUMBAR SPINE W/O DYE: CPT

## 2024-03-15 PROCEDURE — 72125 CT NECK SPINE W/O DYE: CPT

## 2024-03-15 PROCEDURE — 73630 X-RAY EXAM OF FOOT: CPT

## 2024-03-15 PROCEDURE — 99284 EMERGENCY DEPT VISIT MOD MDM: CPT

## 2024-03-15 PROCEDURE — 73562 X-RAY EXAM OF KNEE 3: CPT

## 2024-03-15 PROCEDURE — 72128 CT CHEST SPINE W/O DYE: CPT

## 2024-03-15 PROCEDURE — 73590 X-RAY EXAM OF LOWER LEG: CPT

## 2024-03-15 RX ORDER — TIZANIDINE 4 MG/1
4 TABLET ORAL EVERY 8 HOURS PRN
Qty: 15 TABLET | Refills: 0 | Status: SHIPPED | OUTPATIENT
Start: 2024-03-15
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
## 2024-03-15 NOTE — Clinical Note
[facility] EMERGENCY DEPARTMENT  [address]  [facility] [address]  Phone: [phone]    Erin Sandoval accompanied Yasmin Edging to the emergency department on 3/15/2024. They may return to work on 03/16/2024.        Thank you for choosing [facility].    Yasmin Eason, APRN

## 2024-03-15 NOTE — Clinical Note
[facility] EMERGENCY DEPARTMENT  [address]  [facility] [address]  Phone: [phone]    Yasmin Rosa was seen and treated in our emergency department on 3/15/2024.  She may return to work on 03/18/2024.         Thank you for choosing [facility].    Yasmin Eason APRN

## 2024-03-15 NOTE — Clinical Note
[facility] EMERGENCY DEPARTMENT  [address]  [facility] [address]  Phone: [phone]    Erin Sandoval accompanied Yasmin Edging to the emergency department on 3/15/2024. They may return to work on 03/16/2024.        Thank you for choosing [facility].    Yasmin Eason, APRN

## 2024-03-15 NOTE — Clinical Note
[facility] EMERGENCY DEPARTMENT  [address]  [facility] [address]  Phone: [phone]    Yasmin Rosa was seen and treated in our emergency department on 3/15/2024.  She may return to work on 03/18/2024.         Thank you for choosing [facility].    Yasmin Eason APRN

## 2024-03-15 NOTE — DISCHARGE INSTRUCTIONS
Return to ER if symptoms worsen   Ice to affected areas  Clean abrasion twice daily with soap and water and apply bacitiraicin to area

## 2024-03-20 ENCOUNTER — OFFICE VISIT (OUTPATIENT)
Dept: FAMILY MEDICINE CLINIC | Facility: CLINIC | Age: 24
End: 2024-03-20
Payer: COMMERCIAL

## 2024-03-20 VITALS
WEIGHT: 213 LBS | HEIGHT: 62 IN | TEMPERATURE: 98.2 F | OXYGEN SATURATION: 99 % | SYSTOLIC BLOOD PRESSURE: 120 MMHG | BODY MASS INDEX: 39.2 KG/M2 | DIASTOLIC BLOOD PRESSURE: 81 MMHG | HEART RATE: 91 BPM | RESPIRATION RATE: 22 BRPM

## 2024-03-20 DIAGNOSIS — F41.9 ANXIETY: ICD-10-CM

## 2024-03-20 DIAGNOSIS — L03.116 CELLULITIS OF LEFT LOWER EXTREMITY: Primary | ICD-10-CM

## 2024-03-20 PROCEDURE — 99214 OFFICE O/P EST MOD 30 MIN: CPT | Performed by: FAMILY MEDICINE

## 2024-03-20 PROCEDURE — 1160F RVW MEDS BY RX/DR IN RCRD: CPT | Performed by: FAMILY MEDICINE

## 2024-03-20 PROCEDURE — 1159F MED LIST DOCD IN RCRD: CPT | Performed by: FAMILY MEDICINE

## 2024-03-20 RX ORDER — VENLAFAXINE HYDROCHLORIDE 75 MG/1
75 CAPSULE, EXTENDED RELEASE ORAL DAILY
Qty: 30 CAPSULE | Refills: 2 | Status: SHIPPED | OUTPATIENT
Start: 2024-03-20

## 2024-03-20 RX ORDER — DOXYCYCLINE HYCLATE 100 MG/1
100 CAPSULE ORAL 2 TIMES DAILY
Qty: 14 CAPSULE | Refills: 0 | Status: SHIPPED | OUTPATIENT
Start: 2024-03-20
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
## 2024-03-26 ENCOUNTER — OFFICE VISIT (OUTPATIENT)
Dept: FAMILY MEDICINE CLINIC | Facility: CLINIC | Age: 24
End: 2024-03-26
Payer: COMMERCIAL

## 2024-03-26 VITALS
RESPIRATION RATE: 18 BRPM | OXYGEN SATURATION: 98 % | SYSTOLIC BLOOD PRESSURE: 150 MMHG | DIASTOLIC BLOOD PRESSURE: 109 MMHG | BODY MASS INDEX: 39.38 KG/M2 | HEIGHT: 62 IN | TEMPERATURE: 98.2 F | HEART RATE: 88 BPM | WEIGHT: 214 LBS

## 2024-03-26 DIAGNOSIS — L03.116 CELLULITIS OF LEFT LOWER EXTREMITY: Primary | ICD-10-CM

## 2024-03-26 RX ORDER — CELECOXIB 100 MG/1
100 CAPSULE ORAL 2 TIMES DAILY PRN
Qty: 60 CAPSULE | Refills: 1 | Status: SHIPPED | OUTPATIENT
Start: 2024-03-26

## 2024-03-26 RX ORDER — DOXYCYCLINE HYCLATE 100 MG/1
100 CAPSULE ORAL 2 TIMES DAILY
Qty: 6 CAPSULE | Refills: 0 | Status: SHIPPED | OUTPATIENT
Start: 2024-03-26 | End: 2024-03-29

## 2024-03-26 NOTE — PROGRESS NOTES
"Chief Complaint  Hospital Follow Up Visit (Patient is here for a hospital follow up)    Subjective        Yasmin Rosa presents to [facility]  History of Present Illness  Mrs. Rosa presents for a follow up on cellulitis.  We saw her on 3/20/24 for LLE cellulitis.  She says she is still having a lot of discomfort from it.  The redness and swelling appear much improved.      Objective   Vital Signs:  BP (!) 150/109 (BP Location: Left arm, Patient Position: Sitting, Cuff Size: Adult)   Pulse 88   Temp 98.2 °F (36.8 °C)   Resp 18   Ht 157.5 cm (62.01\")   Wt 97.1 kg (214 lb)   SpO2 98%   BMI 39.13 kg/m²   Estimated body mass index is 39.13 kg/m² as calculated from the following:    Height as of this encounter: 157.5 cm (62.01\").    Weight as of this encounter: 97.1 kg (214 lb).               Physical Exam  Vitals reviewed.   Constitutional:       General: She is not in acute distress.     Appearance: She is not toxic-appearing.   HENT:      Head: Normocephalic and atraumatic.      Mouth/Throat:      Mouth: Mucous membranes are moist.      Pharynx: Oropharynx is clear.   Eyes:      Extraocular Movements: Extraocular movements intact.      Conjunctiva/sclera: Conjunctivae normal.      Pupils: Pupils are equal, round, and reactive to light.   Cardiovascular:      Rate and Rhythm: Normal rate and regular rhythm.      Pulses: Normal pulses.      Heart sounds: No murmur heard.  Pulmonary:      Effort: Pulmonary effort is normal. No respiratory distress.      Breath sounds: Normal breath sounds. No wheezing or rhonchi.   Abdominal:      General: Bowel sounds are normal. There is no distension.      Palpations: Abdomen is soft.      Tenderness: There is no abdominal tenderness.   Musculoskeletal:         General: No swelling or tenderness. Normal range of motion.      Cervical back: Normal range of motion and neck supple. No muscular tenderness.   Skin:     General: Skin is warm and " dry.      Comments: Erythema and induration over left shin has improved   Neurological:      General: No focal deficit present.      Mental Status: She is alert and oriented to person, place, and time.      Cranial Nerves: No cranial nerve deficit.      Motor: No weakness.   Psychiatric:         Mood and Affect: Mood normal.         Behavior: Behavior normal.            Result Review :                     Assessment and Plan     Diagnoses and all orders for this visit:    1. Cellulitis of left lower extremity (Primary)    Other orders  -     doxycycline (VIBRAMYCIN) 100 MG capsule; Take 1 capsule by mouth 2 (Two) Times a Day for 3 days.  Dispense: 6 capsule; Refill: 0  -     celecoxib (CeleBREX) 100 MG capsule; Take 1 capsule by mouth 2 (Two) Times a Day As Needed for Mild Pain.  Dispense: 60 capsule; Refill: 1    Extend abx coverage for 3 more days  Celebrex for pain  Follow up if worsening or failing to improve         Follow Up     No follow-ups on file.  Patient was given instructions and counseling regarding her condition or for health maintenance advice. Please see specific information pulled into the AVS if appropriate.

## 2024-04-18 ENCOUNTER — TELEPHONE (OUTPATIENT)
Dept: FAMILY MEDICINE CLINIC | Facility: CLINIC | Age: 24
End: 2024-04-18
Payer: COMMERCIAL

## 2024-04-18 NOTE — TELEPHONE ENCOUNTER
Called to let the patient know that [facility] has been trying to reach her about her referral. No answer. LVM  Pt can call them at [phone] for an appointment    HUB ok to read but please document response and forward to sender of message .

## 2024-04-21 ENCOUNTER — OFFICE VISIT (OUTPATIENT)
Age: 24
End: 2024-04-21

## 2024-04-21 VITALS
DIASTOLIC BLOOD PRESSURE: 74 MMHG | BODY MASS INDEX: 40.67 KG/M2 | TEMPERATURE: 98.1 F | HEART RATE: 81 BPM | RESPIRATION RATE: 20 BRPM | SYSTOLIC BLOOD PRESSURE: 116 MMHG | WEIGHT: 221 LBS | OXYGEN SATURATION: 98 % | HEIGHT: 62 IN

## 2024-04-21 DIAGNOSIS — Z75.8 DOES NOT HAVE PRIMARY CARE PROVIDER: ICD-10-CM

## 2024-04-21 DIAGNOSIS — H10.33 ACUTE BACTERIAL CONJUNCTIVITIS OF BOTH EYES: Primary | ICD-10-CM

## 2024-04-21 RX ORDER — TOBRAMYCIN AND DEXAMETHASONE 3; 1 MG/ML; MG/ML
1 SUSPENSION/ DROPS OPHTHALMIC
Qty: 1 EACH | Refills: 1 | Status: SHIPPED | OUTPATIENT
Start: 2024-04-21 | End: 2024-04-28

## 2024-04-23 NOTE — TELEPHONE ENCOUNTER
Called to let the patient know that [facility] has been trying to reach her about her referral. No answer.    Pt can call them at [phone] for an appointment     HUB ok to read but please document response and forward to sender of message .

## 2024-09-19 ENCOUNTER — OFFICE VISIT (OUTPATIENT)
Dept: FAMILY MEDICINE CLINIC | Facility: CLINIC | Age: 24
End: 2024-09-19
Payer: COMMERCIAL

## 2024-09-19 VITALS
WEIGHT: 236 LBS | OXYGEN SATURATION: 97 % | TEMPERATURE: 98 F | HEART RATE: 79 BPM | HEIGHT: 62 IN | BODY MASS INDEX: 43.43 KG/M2 | SYSTOLIC BLOOD PRESSURE: 117 MMHG | RESPIRATION RATE: 16 BRPM | DIASTOLIC BLOOD PRESSURE: 79 MMHG

## 2024-09-19 DIAGNOSIS — M54.16 LUMBAR RADICULOPATHY: Primary | ICD-10-CM

## 2024-09-19 DIAGNOSIS — E66.01 SEVERE OBESITY (BMI >= 40): ICD-10-CM

## 2024-09-19 PROCEDURE — 99214 OFFICE O/P EST MOD 30 MIN: CPT | Performed by: FAMILY MEDICINE

## 2024-09-19 PROCEDURE — 1126F AMNT PAIN NOTED NONE PRSNT: CPT | Performed by: FAMILY MEDICINE

## 2024-09-19 RX ORDER — METHYLPREDNISOLONE 4 MG
TABLET, DOSE PACK ORAL
Qty: 21 TABLET | Refills: 0 | Status: SHIPPED | OUTPATIENT
Start: 2024-09-19

## 2024-09-19 RX ORDER — MELOXICAM 15 MG/1
15 TABLET ORAL DAILY
Qty: 30 TABLET | Refills: 1 | Status: SHIPPED | OUTPATIENT
Start: 2024-09-19

## 2024-09-26 ENCOUNTER — NUTRITION (OUTPATIENT)
Dept: BARIATRICS/WEIGHT MGMT | Facility: CLINIC | Age: 24
End: 2024-09-26
Payer: COMMERCIAL

## 2024-09-26 ENCOUNTER — PATIENT ROUNDING (BHMG ONLY) (OUTPATIENT)
Dept: BARIATRICS/WEIGHT MGMT | Facility: CLINIC | Age: 24
End: 2024-09-26
Payer: COMMERCIAL

## 2024-09-26 ENCOUNTER — OFFICE VISIT (OUTPATIENT)
Dept: BARIATRICS/WEIGHT MGMT | Facility: CLINIC | Age: 24
End: 2024-09-26
Payer: COMMERCIAL

## 2024-09-26 VITALS
HEART RATE: 71 BPM | HEIGHT: 64 IN | BODY MASS INDEX: 41.07 KG/M2 | DIASTOLIC BLOOD PRESSURE: 86 MMHG | WEIGHT: 240.6 LBS | SYSTOLIC BLOOD PRESSURE: 123 MMHG | TEMPERATURE: 98.7 F | OXYGEN SATURATION: 98 %

## 2024-09-26 DIAGNOSIS — E66.01 CLASS 3 SEVERE OBESITY DUE TO EXCESS CALORIES WITH SERIOUS COMORBIDITY AND BODY MASS INDEX (BMI) OF 40.0 TO 44.9 IN ADULT: Primary | ICD-10-CM

## 2024-09-26 PROBLEM — E66.813 CLASS 3 SEVERE OBESITY DUE TO EXCESS CALORIES WITH SERIOUS COMORBIDITY AND BODY MASS INDEX (BMI) OF 40.0 TO 44.9 IN ADULT: Status: ACTIVE | Noted: 2024-09-26

## 2024-10-04 ENCOUNTER — PATIENT MESSAGE (OUTPATIENT)
Dept: FAMILY MEDICINE CLINIC | Facility: CLINIC | Age: 24
End: 2024-10-04
Payer: COMMERCIAL

## 2024-10-04 NOTE — TELEPHONE ENCOUNTER
From: Yasmin DUQUE Edging  To: Luis Casillas  Sent: 10/4/2024 9:15 AM CDT  Subject: Steroids     Hey, the steroid pack helped but my back is starting to hurt again is there anyway you can refill it for me?

## 2024-10-08 DIAGNOSIS — M54.16 LUMBAR RADICULOPATHY: ICD-10-CM

## 2024-10-08 RX ORDER — METHYLPREDNISOLONE 4 MG
TABLET, DOSE PACK ORAL
Qty: 21 TABLET | Refills: 0 | Status: SHIPPED | OUTPATIENT
Start: 2024-10-08

## 2024-10-10 ENCOUNTER — PATIENT ROUNDING (BHMG ONLY) (OUTPATIENT)
Dept: BARIATRICS/WEIGHT MGMT | Facility: CLINIC | Age: 24
End: 2024-10-10
Payer: COMMERCIAL

## 2024-10-10 ENCOUNTER — TELEMEDICINE (OUTPATIENT)
Dept: BARIATRICS/WEIGHT MGMT | Facility: CLINIC | Age: 24
End: 2024-10-10
Payer: COMMERCIAL

## 2024-10-10 DIAGNOSIS — E66.01 CLASS 3 SEVERE OBESITY DUE TO EXCESS CALORIES WITH SERIOUS COMORBIDITY AND BODY MASS INDEX (BMI) OF 40.0 TO 44.9 IN ADULT: Primary | ICD-10-CM

## 2024-10-10 DIAGNOSIS — E66.813 CLASS 3 SEVERE OBESITY DUE TO EXCESS CALORIES WITH SERIOUS COMORBIDITY AND BODY MASS INDEX (BMI) OF 40.0 TO 44.9 IN ADULT: Primary | ICD-10-CM

## 2024-10-10 NOTE — PROGRESS NOTES
Patient Rounding General Surgery/Bariatric Surgery sent through Syeda Lal  Practice Manager  General Surgery/Bariatric Surgery

## 2024-10-10 NOTE — PROGRESS NOTES
"Metabolic and Bariatric Surgery Adult Nutrition Assessment    Patient Name: Yasmin Rosa   YOB: 2000   MRN: [id number]     Assessment Date:  10/10/2024     You have chosen to receive care through a telehealth visit.  Do you consent to use a video/audio connection for your medical care today? Yes   Patient Location: in [place]   Provider Location: [facility]     Reason for Visit: Initial Nutrition Assessment     Treatment Pathway: Preoperative Bariatric Surgery    Assessment    Anthropometrics   Wt Readings from Last 1 Encounters:   24 109 kg (240 lb 9.6 oz)     Ht Readings from Last 1 Encounters:   24 161.3 cm (63.5\")     BMI Readings from Last 1 Encounters:   24 41.95 kg/m²        Initial Weight/Date: 240.6 lbs (2024)  Weight Changes since last visit: n/a video visit  Net Weight Change: n/a video visit    Past Medical History:   Diagnosis Date    Abnormal Pap smear of cervix     Chlamydia     with last pregnancy    Eczema     Gestational diabetes     History of transfusion     MRSA infection     abdomen     Ovarian cyst     Urinary tract infection     Vaginal delivery       Past Surgical History:   Procedure Laterality Date     SECTION N/A 5/10/2021    Procedure:  SECTION PRIMARY;  Surgeon: Tomas Dawkins MD;  Location: [facility] LABOR DELIVERY;  Service: Obstetrics/Gynecology;  Laterality: N/A;    CHOLECYSTECTOMY      D & C HYSTEROSCOPY ENDOMETRIAL ABLATION N/A 2022    Procedure: LAPARSCOPIC BILATERAL SALPINGECTOMY, INOCENCIO  ABLATION;  Surgeon: Tomas Dawkins MD;  Location: [facility] OR;  Service: Obstetrics/Gynecology;  Laterality: N/A;    KNEE RECONSTRUCTION, MEDIAL PATELLAR FEMORAL LIGAMENT      KNEE SURGERY      OVARIAN CYST SURGERY      TOTAL LAPAROSCOPIC HYSTERECTOMY N/A 3/10/2023    Procedure: TOTAL LAPAROSCOPIC HYSTERECTOMY;  Surgeon: Tomas Dawkins MD;  Location: [facility] OR;  Service: Obstetrics/Gynecology;  Laterality: N/A; " "     Current Outpatient Medications   Medication Sig Dispense Refill    meloxicam (Mobic) 15 MG tablet Take 1 tablet by mouth Daily. 30 tablet 1    methylPREDNISolone (MEDROL) 4 MG dose pack Take as directed on package instructions. 21 tablet 0    tiZANidine (ZANAFLEX) 4 MG tablet Take 1 tablet by mouth Every 8 (Eight) Hours As Needed for Muscle Spasms. 90 tablet 0     No current facility-administered medications for this visit.      Allergies   Allergen Reactions    Zofran [Ondansetron Hcl] Other (See Comments)     SEVERE ABD PAIN AND CONSTIPATION    Ibuprofen Other (See Comments)     Makes her \"run a fever\"    Latex Rash          Pertinent Social/Behavior/Environmental History: has 5 sons at home (7yo, 5yo, 3 yo twins, 1 yo)  Gets up between 6:30-8am; family goes to bed at 8pm. Eldest son is in school; she is SAHM for the other 4.   Generally grocery shops monthly; gets WIC and Food stamps, does feel she has adequate ability to buy food.     Nutrition Recall  Eating 2-3 meals daily   (M1) ~ 7 am breakfast burrito with sausage, eggs, and salsa. With some onions and ate a cucumber.   (M2) ~ 11 am/12 pm variables depending on what is in the fridge.    (M3) ~ 6 or 7 pm   (M4) n/a  Snacking - none.  Monitoring portions- measuring cups.   Calculating Protein- not consistently   Drinking sugary/carbonated beverages- sprite usually 1/day  Fluid Intake- 8-10 bottles of water daily.     Barriers: children have limited food preferences. Feels like running out of time to get in a 4th meal. Struggling to remember to write in food journal.    Nutrition Intervention  Nutrition education for morbid obesity . Nutrition coaching and intensive behavioral therapy for behavior change provided.  Strategies used included Comprehensive education, Motivational Interviewing , Problem Solving, Skill Development for meal planning and writing in food journal, and Ongoing reinforcement  Review of medical weight loss prescription 4 meal/day " plan and reviewed nutritional needs for Preoperative Bariatric Surgery.  Self-monitoring strategies such as keeping a food journal (on paper or electronically) and calculating fluid/protein intake were discussed.  Recommend increasing physical activity, beyond normal daily habits, gradually working to reach ~30 minutes daily.     Recommended Diet Changes- Green Prescription Meal Plan  Eat 4 meals per day with protein and vegetables at each meal, no carbs after meal 2., Protein goal: 65 gms., Eat vegetables first at each meal., Discussed protein guidelines for shakes and bars., Reduce snacking -use foods from free foods list only., Reduce fat, sugar, and/or salt in food choices., Choose more nutrient dense foods., Choose foods with increased fiber., Monitor portion sizes using a food scale and/or measuring cup., Eliminate soda and sugar-sweetened beverages, and Increase fluid intake to 64 ounces per day      Goals  1. Have a family meal 3 ~ 3/3:30 pm once eldest son is home from school.    2. Spend 10-15 minutes each night to write in food journal and plan for meals for the next day.   3. Decrease sprite intake.     Monitoring/Evaluation Plan  Anticipate follow in 2-3 weeks. Continue collaboration of care with physician and treatment team.     Time Spent 24 minutes    Electronically signed by  Vanessa Rodriguez RDN, LD  10/10/2024 09:00 CDT.

## 2024-10-21 ENCOUNTER — PATIENT MESSAGE (OUTPATIENT)
Dept: FAMILY MEDICINE CLINIC | Facility: CLINIC | Age: 24
End: 2024-10-21
Payer: COMMERCIAL

## 2024-10-30 ENCOUNTER — HOSPITAL ENCOUNTER (OUTPATIENT)
Dept: CARDIOLOGY | Facility: HOSPITAL | Age: 24
Discharge: HOME | End: 2024-10-30
Payer: COMMERCIAL

## 2024-10-30 ENCOUNTER — LAB (OUTPATIENT)
Dept: LAB | Facility: HOSPITAL | Age: 24
End: 2024-10-30
Payer: COMMERCIAL

## 2024-10-30 ENCOUNTER — NUTRITION (OUTPATIENT)
Dept: BARIATRICS/WEIGHT MGMT | Facility: CLINIC | Age: 24
End: 2024-10-30
Payer: COMMERCIAL

## 2024-10-30 ENCOUNTER — APPOINTMENT (OUTPATIENT)
Dept: LAB | Facility: HOSPITAL | Age: 24
End: 2024-10-30
Payer: COMMERCIAL

## 2024-10-30 ENCOUNTER — OFFICE VISIT (OUTPATIENT)
Dept: BARIATRICS/WEIGHT MGMT | Facility: CLINIC | Age: 24
End: 2024-10-30
Payer: COMMERCIAL

## 2024-10-30 VITALS
DIASTOLIC BLOOD PRESSURE: 83 MMHG | HEIGHT: 64 IN | OXYGEN SATURATION: 98 % | BODY MASS INDEX: 40.77 KG/M2 | HEART RATE: 86 BPM | TEMPERATURE: 98 F | SYSTOLIC BLOOD PRESSURE: 133 MMHG | WEIGHT: 238.8 LBS

## 2024-10-30 DIAGNOSIS — Z01.818 PREOPERATIVE TESTING: ICD-10-CM

## 2024-10-30 DIAGNOSIS — G47.10 HYPERSOMNIA: ICD-10-CM

## 2024-10-30 DIAGNOSIS — R53.83 OTHER FATIGUE: ICD-10-CM

## 2024-10-30 DIAGNOSIS — E66.01 CLASS 3 SEVERE OBESITY DUE TO EXCESS CALORIES WITH SERIOUS COMORBIDITY AND BODY MASS INDEX (BMI) OF 40.0 TO 44.9 IN ADULT: ICD-10-CM

## 2024-10-30 DIAGNOSIS — Z13.21 SCREENING FOR MALNUTRITION: ICD-10-CM

## 2024-10-30 DIAGNOSIS — E66.813 CLASS 3 SEVERE OBESITY DUE TO EXCESS CALORIES WITH SERIOUS COMORBIDITY AND BODY MASS INDEX (BMI) OF 40.0 TO 44.9 IN ADULT: Primary | ICD-10-CM

## 2024-10-30 DIAGNOSIS — E66.813 CLASS 3 SEVERE OBESITY DUE TO EXCESS CALORIES WITH SERIOUS COMORBIDITY AND BODY MASS INDEX (BMI) OF 40.0 TO 44.9 IN ADULT: ICD-10-CM

## 2024-10-30 DIAGNOSIS — E55.9 VITAMIN D DEFICIENCY: ICD-10-CM

## 2024-10-30 DIAGNOSIS — E66.01 CLASS 3 SEVERE OBESITY DUE TO EXCESS CALORIES WITH SERIOUS COMORBIDITY AND BODY MASS INDEX (BMI) OF 40.0 TO 44.9 IN ADULT: Primary | ICD-10-CM

## 2024-10-30 DIAGNOSIS — R06.83 SNORING: ICD-10-CM

## 2024-10-30 LAB
25(OH)D3 SERPL-MCNC: 10.4 NG/ML (ref 30–100)
ALBUMIN SERPL-MCNC: 3.9 G/DL (ref 3.5–5.2)
ALBUMIN/GLOB SERPL: 1.8 G/DL
ALP SERPL-CCNC: 43 U/L (ref 39–117)
ALT SERPL W P-5'-P-CCNC: 16 U/L (ref 1–33)
ANION GAP SERPL CALCULATED.3IONS-SCNC: 12 MMOL/L (ref 5–15)
AST SERPL-CCNC: 14 U/L (ref 1–32)
BASOPHILS # BLD AUTO: 0.02 10*3/MM3 (ref 0–0.2)
BASOPHILS NFR BLD AUTO: 0.3 % (ref 0–1.5)
BILIRUB SERPL-MCNC: 1 MG/DL (ref 0–1.2)
BUN SERPL-MCNC: 8 MG/DL (ref 6–20)
BUN/CREAT SERPL: 13.3 (ref 7–25)
CALCIUM SPEC-SCNC: 8.6 MG/DL (ref 8.6–10.5)
CHLORIDE SERPL-SCNC: 104 MMOL/L (ref 98–107)
CHOLEST SERPL-MCNC: 194 MG/DL (ref 0–200)
CO2 SERPL-SCNC: 25 MMOL/L (ref 22–29)
CREAT SERPL-MCNC: 0.6 MG/DL (ref 0.57–1)
DEPRECATED RDW RBC AUTO: 37 FL (ref 37–54)
EGFRCR SERPLBLD CKD-EPI 2021: 127.9 ML/MIN/1.73
EOSINOPHIL # BLD AUTO: 0.14 10*3/MM3 (ref 0–0.4)
EOSINOPHIL NFR BLD AUTO: 2.2 % (ref 0.3–6.2)
ERYTHROCYTE [DISTWIDTH] IN BLOOD BY AUTOMATED COUNT: 12.1 % (ref 12.3–15.4)
FOLATE SERPL-MCNC: 15.9 NG/ML (ref 4.78–24.2)
GLOBULIN UR ELPH-MCNC: 2.2 GM/DL
GLUCOSE SERPL-MCNC: 90 MG/DL (ref 65–99)
HBA1C MFR BLD: 5.3 % (ref 4.8–5.6)
HCT VFR BLD AUTO: 41.4 % (ref 34–46.6)
HDLC SERPL-MCNC: 37 MG/DL (ref 40–60)
HGB BLD-MCNC: 13.6 G/DL (ref 12–15.9)
IMM GRANULOCYTES # BLD AUTO: 0.01 10*3/MM3 (ref 0–0.05)
IMM GRANULOCYTES NFR BLD AUTO: 0.2 % (ref 0–0.5)
IRON 24H UR-MRATE: 101 MCG/DL (ref 37–145)
LDLC SERPL CALC-MCNC: 101 MG/DL (ref 0–100)
LDLC/HDLC SERPL: 2.45 {RATIO}
LYMPHOCYTES # BLD AUTO: 2.09 10*3/MM3 (ref 0.7–3.1)
LYMPHOCYTES NFR BLD AUTO: 32.2 % (ref 19.6–45.3)
MCH RBC QN AUTO: 27.2 PG (ref 26.6–33)
MCHC RBC AUTO-ENTMCNC: 32.9 G/DL (ref 31.5–35.7)
MCV RBC AUTO: 82.8 FL (ref 79–97)
MONOCYTES # BLD AUTO: 0.52 10*3/MM3 (ref 0.1–0.9)
MONOCYTES NFR BLD AUTO: 8 % (ref 5–12)
NEUTROPHILS NFR BLD AUTO: 3.72 10*3/MM3 (ref 1.7–7)
NEUTROPHILS NFR BLD AUTO: 57.1 % (ref 42.7–76)
NRBC BLD AUTO-RTO: 0 /100 WBC (ref 0–0.2)
PLATELET # BLD AUTO: 272 10*3/MM3 (ref 140–450)
PMV BLD AUTO: 9.4 FL (ref 6–12)
POTASSIUM SERPL-SCNC: 3.8 MMOL/L (ref 3.5–5.2)
PROT SERPL-MCNC: 6.1 G/DL (ref 6–8.5)
RBC # BLD AUTO: 5 10*6/MM3 (ref 3.77–5.28)
SODIUM SERPL-SCNC: 141 MMOL/L (ref 136–145)
TRIGL SERPL-MCNC: 331 MG/DL (ref 0–150)
TSH SERPL DL<=0.05 MIU/L-ACNC: 1.96 UIU/ML (ref 0.27–4.2)
VIT B12 BLD-MCNC: 173 PG/ML (ref 211–946)
VLDLC SERPL-MCNC: 56 MG/DL (ref 5–40)
WBC NRBC COR # BLD AUTO: 6.5 10*3/MM3 (ref 3.4–10.8)

## 2024-10-30 PROCEDURE — 80061 LIPID PANEL: CPT

## 2024-10-30 PROCEDURE — 93005 ELECTROCARDIOGRAM TRACING: CPT | Performed by: NURSE PRACTITIONER

## 2024-10-30 PROCEDURE — 82306 VITAMIN D 25 HYDROXY: CPT

## 2024-10-30 PROCEDURE — 82746 ASSAY OF FOLIC ACID SERUM: CPT

## 2024-10-30 PROCEDURE — 36415 COLL VENOUS BLD VENIPUNCTURE: CPT

## 2024-10-30 PROCEDURE — 82525 ASSAY OF COPPER: CPT

## 2024-10-30 PROCEDURE — 84446 ASSAY OF VITAMIN E: CPT

## 2024-10-30 PROCEDURE — 83540 ASSAY OF IRON: CPT

## 2024-10-30 PROCEDURE — 83036 HEMOGLOBIN GLYCOSYLATED A1C: CPT

## 2024-10-30 PROCEDURE — 80050 GENERAL HEALTH PANEL: CPT

## 2024-10-30 PROCEDURE — 84207 ASSAY OF VITAMIN B-6: CPT

## 2024-10-30 PROCEDURE — 82607 VITAMIN B-12: CPT

## 2024-10-30 PROCEDURE — 84630 ASSAY OF ZINC: CPT

## 2024-10-30 PROCEDURE — 84590 ASSAY OF VITAMIN A: CPT

## 2024-10-30 NOTE — PROGRESS NOTES
"Patient Care Team:  Luis Casillas MD as PCP - General (Family Medicine)    Reason for Visit:  Surgical Weight Loss      Subjective     History of Present Illness  The patient is a 24-year-old female who presents for evaluation of weight management. She is accompanied by an adult male.    Her weight has remained stable since her last visit. She is considering bariatric surgery as a potential solution. She has a history of gastric bypass surgery due to gastroparesis, which caused food to accumulate and decompose in her stomach. This issue persisted for several years before it was identified by a doctor in [place]. She has an upcoming appointment with her doctor next week.    Her daily routine involves waking up at 4:30 am, preparing breakfast for her children, and then getting them ready for school. She has been advised to plan her meals in advance but finds it challenging to find the time to do so. She does not take any multivitamins.    She also reports sleep disturbances, getting only 4 to 5 hours of sleep per night and snoring. She does not experience episodes of waking up coughing, choking, or gasping for air. However, she does wake up during the night to urinate, which she attributes to a lifelong weak bladder condition. She often feels tired during the day.    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      The following portions of the patient's history were reviewed and updated as appropriate: allergies, current medications, past family history, past medical history, past social history, past surgical history, and problem list.    Objective   /83 (BP Location: Right arm, Patient Position: Sitting, Cuff Size: Adult)   Pulse 86   Temp 98 °F (36.7 °C)   Ht 161.3 cm (63.5\")   Wt 108 kg (238 lb 12.8 oz)   LMP 02/09/2022 (Exact Date)   SpO2 98%   BMI 41.64 kg/m²       10/30/24  0858   Weight: 108 kg (238 lb 12.8 oz)            Physical Exam  Vitals reviewed. "   Constitutional:       Appearance: She is obese.   Cardiovascular:      Rate and Rhythm: Normal rate and regular rhythm.   Pulmonary:      Effort: Pulmonary effort is normal.   Abdominal:      General: Bowel sounds are normal.      Palpations: Abdomen is soft.   Musculoskeletal:         General: Normal range of motion.   Skin:     General: Skin is warm and dry.   Neurological:      Mental Status: She is alert and oriented to person, place, and time.   Psychiatric:         Mood and Affect: Mood normal.         Behavior: Behavior normal.         Physical Exam      Results       [place] Sleepiness Scale (ESS)  Rate situations associated with sleepiness:      Sitting and reading    0  No chance of dozing (0 points)     Slight chance of dozing (1 point)     Moderate chance of dozing (2 points)     High chance of dozing (3 points)   Watching television      No chance of dozing (0 points)     Slight chance of dozing (1 point)     Moderate chance of dozing (2 points)   3  High chance of dozing (3 points)   Sitting inactive in a public place      No chance of dozing (0 points)     Slight chance of dozing (1 point)     Moderate chance of dozing (2 points)     High chance of dozing (3 points)   Sitting for an hour as a passenger in a car    0  No chance of dozing (0 points)     Slight chance of dozing (1 point)     Moderate chance of dozing (2 points)     High chance of dozing (3 points)   Lying down in the afternoon to rest      No chance of dozing (0 points)     Slight chance of dozing (1 point)     Moderate chance of dozing (2 points)   3  High chance of dozing (3 points)   Sitting and talking to another person    0  No chance of dozing (0 points)     Slight chance of dozing (1 point)     Moderate chance of dozing (2 points)     High chance of dozing (3 points)   Sitting quietly after a lunch (no alcohol at lunch)    0  No chance of dozing (0 points)     Slight chance of dozing (1 point)     Moderate chance of dozing (2  points)     High chance of dozing (3 points)   Sitting in a car, stopped for a few minutes due to traffic    0  No chance of dozing (0 points)     Slight chance of dozing (1 point)     Moderate chance of dozing (2 points)     High chance of dozing (3 points)         Total Criteria Point Count:6      Assessment & Plan   There are no diagnoses linked to this encounter.     Assessment & Plan  1. Weight management.  She is advised to start eating her first meal before her day with all the kids begins. Suggestions include preparing meals the night before, such as an egg casserole, and incorporating a protein shake as one of the meals. She is encouraged to log her meals using the [employer] josefina or plan them the night before. A comprehensive panel of labs, including a fasting cholesterol test, will be ordered. She is advised to start a regimen of multivitamins, specifically a bariatric multivitamin due to her gastric bypass surgery.     2. Referral to Psychiatry.  A referral to psychiatry will be made for therapeutic intervention. She is advised to have this scheduled before her next appointment.    3. Sleep Study.  A home-based sleep study will be arranged. She reports snoring at night and waking up to urinate, which may indicate sleep disturbances.    4. EKG.  An EKG will be conducted at our heart center, available Monday through Friday before 2:30 PM on a walk-in basis.      Yasmin Rosa was seen today for follow-up, obesity, nutrition counseling and weight loss.    Today we discussed healthy changes in lifestyle, diet, and exercise. Dietician consultation obtained.  Yasmin Rosa had received handouts to her explaining the recommendation on portion sizes/appetite control/reading nutrition labels.   Intensive behavioral therapy for obesity was done today as well.     Follow up in 1 month for a weight recheck.    Patient will continue GREEN meal plan.     Mady Gauthier, APRONEIDA   09:29 CDT  10/30/24      I spent 30  minutes caring for Yasmin on this date of service. This time includes time spent by me in the following activities: preparing for the visit, reviewing tests, obtaining and/or reviewing a separately obtained history, performing a medically appropriate examination and/or evaluation, counseling and educating the patient/family/caregiver, ordering medications, tests, or procedures, documenting information in the medical record and care coordination; discussing further testing.  Patient or patient representative verbalized consent for the use of Ambient Listening during the visit with  KELSIE Godinez for chart documentation. 10/30/2024  09:31 CDT

## 2024-10-30 NOTE — PROGRESS NOTES
"Metabolic and Bariatric Surgery Adult Nutrition Assessment    Patient Name: Yasmin Rosa   YOB: 2000   MRN: [id number]     Assessment Date:  10/30/2024     Reason for Visit: Follow-up Nutrition Assessment     Treatment Pathway: Medical Weight Loss    Assessment    Anthropometrics   Wt Readings from Last 1 Encounters:   10/30/24 108 kg (238 lb 12.8 oz)     Ht Readings from Last 1 Encounters:   10/30/24 161.3 cm (63.5\")     BMI Readings from Last 1 Encounters:   10/30/24 41.64 kg/m²        Initial Weight/Date: 240.6 lbs  Weight Changes since last visit: -1.8 lbs  Net Weight Change: -1.8 lbs    Past Medical History:   Diagnosis Date    Abnormal Pap smear of cervix     Chlamydia     with last pregnancy    Eczema     Gestational diabetes     History of transfusion     MRSA infection     abdomen     Ovarian cyst     Urinary tract infection     Vaginal delivery       Past Surgical History:   Procedure Laterality Date     SECTION N/A 5/10/2021    Procedure:  SECTION PRIMARY;  Surgeon: Tomas Dawkins MD;  Location:  [facility] LABOR DELIVERY;  Service: Obstetrics/Gynecology;  Laterality: N/A;    CHOLECYSTECTOMY      D & C HYSTEROSCOPY ENDOMETRIAL ABLATION N/A 2022    Procedure: LAPARSCOPIC BILATERAL SALPINGECTOMY, INOCENCIO  ABLATION;  Surgeon: Tomas Dawkins MD;  Location: [facility] OR;  Service: Obstetrics/Gynecology;  Laterality: N/A;    KNEE RECONSTRUCTION, MEDIAL PATELLAR FEMORAL LIGAMENT      KNEE SURGERY      OVARIAN CYST SURGERY      TOTAL LAPAROSCOPIC HYSTERECTOMY N/A 3/10/2023    Procedure: TOTAL LAPAROSCOPIC HYSTERECTOMY;  Surgeon: Tomas Dawkins MD;  Location: [facility] OR;  Service: Obstetrics/Gynecology;  Laterality: N/A;      Current Outpatient Medications   Medication Sig Dispense Refill    meloxicam (Mobic) 15 MG tablet Take 1 tablet by mouth Daily. 30 tablet 1    tiZANidine (ZANAFLEX) 4 MG tablet Take 1 tablet by mouth Every 8 (Eight) Hours As Needed for Muscle " "Spasms. 90 tablet 0     No current facility-administered medications for this visit.      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     Nutrition Recall  Eating 2 meals daily \"on a good day\"   Food recall reviewed.  Snacking - does stress eat and emotional eat.   Monitoring portions- not consistently.       Nutrition Intervention  Nutrition education for morbid obesity. Nutrition coaching and intensive behavioral therapy for behavior change provided.  Strategies used included Comprehensive education, Motivational Interviewing , Problem Solving, Skill Development for time blocking/time management, meal planning, and Ongoing reinforcement  Review of medical weight loss prescription 4 meal/day plan and reviewed nutritional needs for Preoperative Bariatric Surgery.  Self-monitoring strategies such as keeping a food journal (on paper or electronically) and calculating fluid/protein intake were discussed.  Recommend increasing physical activity, beyond normal daily habits, gradually working to reach ~30 minutes daily.     Recommended Diet Changes- Green Prescription Meal Plan  Eat 4 meals per day with protein and vegetables at each meal, no carbs after meal 2., Protein goal: 65 gms., Eat vegetables first at each meal., Discussed protein guidelines for shakes and bars., Reduce snacking -use foods from free foods list only., Reduce fat, sugar, and/or salt in food choices., Choose more nutrient dense foods., Choose foods with increased fiber., Monitor portion sizes using a food scale and/or measuring cup., Eliminate soda and sugar-sweetened beverages, and Increase fluid intake to 64 ounces per day      Goals  1. Focus on having a meal 1.   2. Write out " schedule for the day for time management.   3. Keep a food journal.    Monitoring/Evaluation Plan  Anticipate follow in 1 months. Continue collaboration of care with physician and treatment team.     Time Spent 20 minutes    Electronically signed by  Vanessa Rodriguez RDN, LD  10/30/2024 09:39 CDT.

## 2024-10-31 LAB
QT INTERVAL: 406 MS
QTC INTERVAL: 422 MS

## 2024-11-02 LAB — COPPER SERPL-MCNC: 77 UG/DL (ref 80–158)

## 2024-11-04 ENCOUNTER — TELEPHONE (OUTPATIENT)
Dept: BARIATRICS/WEIGHT MGMT | Facility: CLINIC | Age: 24
End: 2024-11-04
Payer: COMMERCIAL

## 2024-11-04 LAB — ZINC SERPL-MCNC: 80 UG/DL (ref 44–115)

## 2024-11-04 NOTE — TELEPHONE ENCOUNTER
Patient was informed of  stable ECG but her insurance requires a cardiac risk assessment.  She was agreeable.

## 2024-11-05 LAB
A-TOCOPHEROL VIT E SERPL-MCNC: 10.5 MG/L (ref 5.9–19.4)
GAMMA TOCOPHEROL SERPL-MCNC: 3.2 MG/L (ref 0.7–4.9)
VIT A SERPL-MCNC: 67.8 UG/DL (ref 18.9–57.3)

## 2024-11-07 ENCOUNTER — OFFICE VISIT (OUTPATIENT)
Dept: FAMILY MEDICINE CLINIC | Facility: CLINIC | Age: 24
End: 2024-11-07
Payer: COMMERCIAL

## 2024-11-07 VITALS
WEIGHT: 238 LBS | OXYGEN SATURATION: 99 % | RESPIRATION RATE: 18 BRPM | TEMPERATURE: 98.6 F | HEART RATE: 98 BPM | BODY MASS INDEX: 40.63 KG/M2 | HEIGHT: 64 IN | SYSTOLIC BLOOD PRESSURE: 118 MMHG | DIASTOLIC BLOOD PRESSURE: 87 MMHG

## 2024-11-07 VITALS
OXYGEN SATURATION: 99 % | WEIGHT: 238 LBS | HEART RATE: 98 BPM | BODY MASS INDEX: 40.63 KG/M2 | TEMPERATURE: 98.6 F | HEIGHT: 64 IN | DIASTOLIC BLOOD PRESSURE: 87 MMHG | SYSTOLIC BLOOD PRESSURE: 118 MMHG | RESPIRATION RATE: 18 BRPM

## 2024-11-07 DIAGNOSIS — F41.9 ANXIETY: ICD-10-CM

## 2024-11-07 DIAGNOSIS — E78.49 OTHER HYPERLIPIDEMIA: ICD-10-CM

## 2024-11-07 DIAGNOSIS — Z00.00 ANNUAL PHYSICAL EXAM: Primary | ICD-10-CM

## 2024-11-07 DIAGNOSIS — E55.9 VITAMIN D DEFICIENCY: Primary | ICD-10-CM

## 2024-11-07 LAB — PYRIDOXAL PHOS SERPL-MCNC: 9.1 UG/L (ref 3.4–65.2)

## 2024-11-07 PROCEDURE — 1126F AMNT PAIN NOTED NONE PRSNT: CPT | Performed by: FAMILY MEDICINE

## 2024-11-07 PROCEDURE — 99395 PREV VISIT EST AGE 18-39: CPT | Performed by: FAMILY MEDICINE

## 2024-11-07 PROCEDURE — 99214 OFFICE O/P EST MOD 30 MIN: CPT | Performed by: FAMILY MEDICINE

## 2024-11-07 RX ORDER — BUSPIRONE HYDROCHLORIDE 5 MG/1
5 TABLET ORAL 3 TIMES DAILY
Qty: 90 TABLET | Refills: 0 | Status: SHIPPED | OUTPATIENT
Start: 2024-11-07

## 2024-11-07 RX ORDER — ERGOCALCIFEROL 1.25 MG/1
50000 CAPSULE, LIQUID FILLED ORAL WEEKLY
Qty: 5 CAPSULE | Refills: 2 | Status: SHIPPED | OUTPATIENT
Start: 2024-11-07

## 2024-11-07 NOTE — PROGRESS NOTES
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    Tobacco: None    Alcohol:  No regular use    No Recreational Drug use    Objective   Vital Signs:  /87 (BP Location: Left arm, Patient Position: Sitting, Cuff Size: Large Adult)   Pulse 98   Temp 98.6 °F (37 °C) (Infrared)   Resp 18   Ht 161.3 cm (63.5\")   Wt 108 kg (238 lb)   SpO2 99%   BMI 41.50 kg/m²   Estimated body mass index is 41.5 kg/m² as calculated from the following:    Height as of this encounter: 161.3 cm (63.5\").    Weight as of this encounter: 108 kg (238 lb).               Physical Exam  Vitals reviewed.   Constitutional:       General: She is not in acute distress.     Appearance: Normal appearance. She is normal weight. She is not ill-appearing, toxic-appearing or diaphoretic.   HENT:      Head: Normocephalic and atraumatic.      Right Ear: External ear normal.      Left Ear: External ear normal.      Nose: Nose normal.   Eyes:      Extraocular Movements: Extraocular movements intact.   Cardiovascular:      Rate and Rhythm: Normal rate and regular rhythm.   Pulmonary:      Effort: Pulmonary effort is normal. No respiratory distress.      Breath sounds: Normal breath sounds.   Skin:     General: Skin is warm and dry.      Coloration: Skin is not jaundiced or pale.   Neurological:      Mental Status: She is alert and oriented to person, place, and time.   Psychiatric:         Mood and Affect: Mood normal.         Behavior: Behavior normal.         Thought Content: Thought content normal.         Judgment: Judgment normal.              Result Review :                     Assessment and Plan "     Diagnoses and all orders for this visit:    1. Annual physical exam (Primary)        Counseled on diet and exercise  Counseled on lifestyle modifications  Counseled on vaccines.  She declines vaccines.             Follow Up     No follow-ups on file.  Patient was given instructions and counseling regarding her condition or for health maintenance advice. Please see specific information pulled into the AVS if appropriate.

## 2024-11-07 NOTE — PROGRESS NOTES
"Chief Complaint  Annual Exam (Pt is here for a physical )    Subjective        Yasmin Rosa presents to [facility]  History of Present Illness  Mrs. Rosa presents today to discuss lab results done at the Bariatric clinic.      Lipid Panel shows a total cholesterol of 194.  LDL was 101.  HDL was 37.      Vitamin A level was elevated at 67.    Vitamin D is low.      Her anxiety is doing worse.  She has had anxiety for a long time but has been able to manage without medication.  She has been on Zoloft and Effexor.          Objective   Vital Signs:  /87 (BP Location: Left arm, Patient Position: Sitting, Cuff Size: Adult)   Pulse 98   Temp 98.6 °F (37 °C) (Temporal)   Resp 18   Ht 161.3 cm (63.5\")   Wt 108 kg (238 lb)   SpO2 99%   BMI 41.49 kg/m²   Estimated body mass index is 41.49 kg/m² as calculated from the following:    Height as of this encounter: 161.3 cm (63.5\").    Weight as of this encounter: 108 kg (238 lb).            Physical Exam  Vitals reviewed.   Constitutional:       General: She is not in acute distress.     Appearance: Normal appearance. She is normal weight. She is not ill-appearing, toxic-appearing or diaphoretic.   HENT:      Head: Normocephalic and atraumatic.      Right Ear: External ear normal.      Left Ear: External ear normal.      Nose: Nose normal.   Eyes:      Extraocular Movements: Extraocular movements intact.   Cardiovascular:      Rate and Rhythm: Normal rate and regular rhythm.   Pulmonary:      Effort: Pulmonary effort is normal. No respiratory distress.      Breath sounds: Normal breath sounds.   Skin:     General: Skin is warm and dry.      Coloration: Skin is not jaundiced or pale.   Neurological:      Mental Status: She is alert and oriented to person, place, and time.   Psychiatric:         Mood and Affect: Mood normal.         Behavior: Behavior normal.         Thought Content: Thought content normal.         Judgment: " Judgment normal.            Result Review :                Assessment and Plan   Diagnoses and all orders for this visit:    1. Vitamin D deficiency (Primary)  -     vitamin D (ERGOCALCIFEROL) 1.25 MG (94415 UT) capsule capsule; Take 1 capsule by mouth 1 (One) Time Per Week.  Dispense: 5 capsule; Refill: 2    2. Other hyperlipidemia    ASCVD risk (at age 40) would be 1%.  Risk is obviously lower at 24 years old.  Counseled on diet and exercise.  Will hold off on Statin therapy at this point.    Will start Vitamin D supplementation.      Will re-check Vitamin A levels in a few months.           Follow Up   No follow-ups on file.  Patient was given instructions and counseling regarding her condition or for health maintenance advice. Please see specific information pulled into the AVS if appropriate.

## 2024-11-08 ENCOUNTER — TELEPHONE (OUTPATIENT)
Dept: BARIATRICS/WEIGHT MGMT | Facility: CLINIC | Age: 24
End: 2024-11-08
Payer: COMMERCIAL

## 2024-11-08 DIAGNOSIS — R79.89 HIGH SERUM VITAMIN A: Primary | ICD-10-CM

## 2024-11-08 NOTE — TELEPHONE ENCOUNTER
----- Message from Mady Gauthier sent at 11/8/2024 11:21 AM CST -----  Please notify patient with findings now that all results are back- Vitamin D is low- begin the vitamin d once a week supplement sent by PCP. Vitamin A is elevated, this can be rechecked in 3 months- I will place order.   B12 is low- begin b12 daily OTC. Lipid panel is abnormal, triglycerides are very elevated- the prescription meal plan will help with this. She can also consider Niacin if her PCP is okay with this, it can cause flushing so that is important to rememb  er but please check with PCP before beginning.

## 2024-11-19 ENCOUNTER — OFFICE VISIT (OUTPATIENT)
Dept: BARIATRICS/WEIGHT MGMT | Facility: CLINIC | Age: 24
End: 2024-11-19
Payer: COMMERCIAL

## 2024-11-19 VITALS
TEMPERATURE: 98.4 F | HEIGHT: 64 IN | BODY MASS INDEX: 41.09 KG/M2 | WEIGHT: 240.7 LBS | OXYGEN SATURATION: 97 % | HEART RATE: 79 BPM | SYSTOLIC BLOOD PRESSURE: 118 MMHG | DIASTOLIC BLOOD PRESSURE: 85 MMHG

## 2024-11-19 DIAGNOSIS — E66.01 CLASS 3 SEVERE OBESITY DUE TO EXCESS CALORIES WITH SERIOUS COMORBIDITY AND BODY MASS INDEX (BMI) OF 40.0 TO 44.9 IN ADULT: Primary | ICD-10-CM

## 2024-11-19 DIAGNOSIS — E66.813 CLASS 3 SEVERE OBESITY DUE TO EXCESS CALORIES WITH SERIOUS COMORBIDITY AND BODY MASS INDEX (BMI) OF 40.0 TO 44.9 IN ADULT: Primary | ICD-10-CM

## 2024-11-19 DIAGNOSIS — Z01.818 PREOPERATIVE CLEARANCE: ICD-10-CM

## 2024-11-19 DIAGNOSIS — K21.9 GASTROESOPHAGEAL REFLUX DISEASE WITHOUT ESOPHAGITIS: ICD-10-CM

## 2024-11-19 PROCEDURE — 99214 OFFICE O/P EST MOD 30 MIN: CPT | Performed by: SURGERY

## 2024-11-19 PROCEDURE — 1160F RVW MEDS BY RX/DR IN RCRD: CPT | Performed by: SURGERY

## 2024-11-19 PROCEDURE — 1159F MED LIST DOCD IN RCRD: CPT | Performed by: SURGERY

## 2024-11-19 NOTE — PROGRESS NOTES
Patient Care Team:  Luis Casillas MD as PCP - General (Family Medicine)    Reason for Visit:  Surgical Weight loss      Subjective     Yasmin Rosa is a pleasant 24 y.o. female and presents with morbid obesity with her Body mass index is 41.97 kg/m².    She is here for discussion of the upper endoscopic procedure.  She stated she has been with the disease of obesity for year(s).  She stated she suffers from GERD symptoms and morbid obesity due to her weight gain.  She stated that weight loss helps alleviate these symptoms.   She stated that she has tried multiple diet regimens to help with weight loss.  She stated that she has attempted these conservative methods for weight loss without maintaining long term success.  Today she and myself will discuss surgical weight loss options such as the Laparoscopic Sleeve Gastrectomy or the Laparoscopic R - Y Gastric Bypass.        Review of Systems  General ROS: negative  Respiratory ROS: no cough, shortness of breath, or wheezing  Cardiovascular ROS: no chest pain or dyspnea on exertion  Gastrointestinal ROS: no abdominal pain, change in bowel habits, or black or bloody stools    History  Past Medical History:   Diagnosis Date    Abnormal Pap smear of cervix     Anxiety     Arthritis     Chlamydia     with last pregnancy    Eczema     Gestational diabetes     History of transfusion     MRSA infection     abdomen     Ovarian cyst     Urinary tract infection     Vaginal delivery      Past Surgical History:   Procedure Laterality Date     SECTION N/A 5/10/2021    Procedure:  SECTION PRIMARY;  Surgeon: Tomas Dawkins MD;  Location: [facility] LABOR DELIVERY;  Service: Obstetrics/Gynecology;  Laterality: N/A;    CHOLECYSTECTOMY      D & C HYSTEROSCOPY ENDOMETRIAL ABLATION N/A 2022    Procedure: LAPARSCOPIC BILATERAL SALPINGECTOMY, INOCENCIO  ABLATION;  Surgeon: Tomas Dawkins MD;  Location: [facility] OR;  Service: Obstetrics/Gynecology;   Laterality: N/A;    KNEE RECONSTRUCTION, MEDIAL PATELLAR FEMORAL LIGAMENT      KNEE SURGERY      OVARIAN CYST SURGERY      TOTAL LAPAROSCOPIC HYSTERECTOMY N/A 3/10/2023    Procedure: TOTAL LAPAROSCOPIC HYSTERECTOMY;  Surgeon: Tomas Dawkins MD;  Location: [facility] OR;  Service: Obstetrics/Gynecology;  Laterality: N/A;     Family History   Problem Relation Age of Onset    Cancer Father     Diabetes Maternal Aunt      Social History     Tobacco Use    Smoking status: Never    Smokeless tobacco: Never   Vaping Use    Vaping status: Never Used   Substance Use Topics    Alcohol use: No    Drug use: No     E-cigarette/Vaping    E-cigarette/Vaping Use Never User      E-cigarette/Vaping Substances    Nicotine No     THC No     CBD No     Flavoring No      (Not in a hospital admission)   Allergies:  Zofran [ondansetron hcl], Ibuprofen, and Latex      Current Outpatient Medications:     busPIRone (BUSPAR) 5 MG tablet, Take 1 tablet by mouth 3 (Three) Times a Day., Disp: 90 tablet, Rfl: 0    tiZANidine (ZANAFLEX) 4 MG tablet, Take 1 tablet by mouth Every 8 (Eight) Hours As Needed for Muscle Spasms., Disp: 90 tablet, Rfl: 0    vitamin D (ERGOCALCIFEROL) 1.25 MG (27595 UT) capsule capsule, Take 1 capsule by mouth 1 (One) Time Per Week., Disp: 5 capsule, Rfl: 2    meloxicam (Mobic) 15 MG tablet, Take 1 tablet by mouth Daily. (Patient not taking: Reported on 11/19/2024), Disp: 30 tablet, Rfl: 1    Objective     Vital Signs  Temp:  [98.4 °F (36.9 °C)] 98.4 °F (36.9 °C)  Heart Rate:  [79] 79  BP: (118)/(85) 118/85  Body mass index is 41.97 kg/m².      11/19/24  0906   Weight: 109 kg (240 lb 11.2 oz)       Physical Exam:     HEENT: extra ocular movement intact and sclera clear, anicteric  Respiratory: appears well, vitals normal, no respiratory distress, acyanotic, normal RR, chest clear, no wheezing, crepitations, rhonchi, normal symmetric air entry  Cardiovascular: regular rate and rhythm  GI: Soft, non-tender, normal bowel  sounds; no bruits, organomegaly or masses.  Abnormal shape: obese  Musculoskeletal: inspection - no abnormality  Neurologic: alert, oriented, normal speech, no focal findings or movement disorder noted       Results Review:   I reviewed the patient's new clinical results.        Assessment & Plan   Encounter Diagnoses   Name Primary?    Class 3 severe obesity due to excess calories with serious comorbidity and body mass index (BMI) of 40.0 to 44.9 in adult Yes    Gastroesophageal reflux disease without esophagitis            1.  I believe this patient will be a good candidate for weight loss surgery.  I have discussed the Berna - Y Gastric Bypass, laparoscopic sleeve gastrectomy and the Laparoscopic Gastric Band procedures.  We discussed the benefits of the surgeries including the benefit of weight loss and the possible reversal of co-morbid conditions associated with morbid obesity. I explained to the patient that prior to making a definitive decision on the type of surgery she will require an esophagogastroduodenoscopy with biopsies to assess for any contraindications for surgical weight loss.  I explained the alternatives  include not doing anything, or pursuing an UGI series which only offers a diagnosis with potential less accuracy compared to EGD, the benefits of the EGD such as identifying the pathology and anatomy of the upper GI system, and the risks and complications of the endoscopy were discussed in detail as well. I discussed the risk of perforation (one out of 5035-8450, riskier with dilation), bleeding (one out of 500), and the rare risks of infection, adverse reaction to anesthesia, respiratory failure, cardiac failure including MI and adverse reaction to medications such as allergic reactions. We discussed consequences that could occur if a risk were to develop such as the need for hospitalization, blood transfusion, surgical intervention, medications, pain, disability and death. The patient  verbalizes understanding and agrees to proceed. such as bleeding, perforation, swallowing difficulties and gas bloat can occur after this procedure.    Upon completion of our discussion and addressing and answering her questions to her satisfation, informed consent was obtained.  She will be scheduled accordingly for the esophagogastroduodenoscopy procedure.    I discussed the patients findings and my recommendations with patient.     Dr. Chandler Fitch MD [facility]    11/19/24  09:29 CST  Patient Care Team:  Luis Casillas MD as PCP - General (Family Medicine)

## 2024-12-06 ENCOUNTER — OFFICE VISIT (OUTPATIENT)
Dept: FAMILY MEDICINE CLINIC | Facility: CLINIC | Age: 24
End: 2024-12-06
Payer: COMMERCIAL

## 2024-12-06 VITALS
WEIGHT: 241 LBS | TEMPERATURE: 98 F | BODY MASS INDEX: 41.15 KG/M2 | HEART RATE: 70 BPM | HEIGHT: 64 IN | OXYGEN SATURATION: 98 % | SYSTOLIC BLOOD PRESSURE: 120 MMHG | RESPIRATION RATE: 20 BRPM | DIASTOLIC BLOOD PRESSURE: 80 MMHG

## 2024-12-06 DIAGNOSIS — G47.09 OTHER INSOMNIA: Primary | ICD-10-CM

## 2024-12-06 DIAGNOSIS — F41.9 ANXIETY: ICD-10-CM

## 2024-12-06 PROCEDURE — 1159F MED LIST DOCD IN RCRD: CPT | Performed by: FAMILY MEDICINE

## 2024-12-06 PROCEDURE — 99214 OFFICE O/P EST MOD 30 MIN: CPT | Performed by: FAMILY MEDICINE

## 2024-12-06 PROCEDURE — 1160F RVW MEDS BY RX/DR IN RCRD: CPT | Performed by: FAMILY MEDICINE

## 2024-12-06 PROCEDURE — 1126F AMNT PAIN NOTED NONE PRSNT: CPT | Performed by: FAMILY MEDICINE

## 2024-12-06 RX ORDER — TRAZODONE HYDROCHLORIDE 50 MG/1
50 TABLET, FILM COATED ORAL NIGHTLY PRN
Qty: 30 TABLET | Refills: 1 | Status: SHIPPED | OUTPATIENT
Start: 2024-12-06

## 2024-12-06 RX ORDER — BUSPIRONE HYDROCHLORIDE 10 MG/1
10 TABLET ORAL 3 TIMES DAILY
Qty: 30 TABLET | Refills: 1 | Status: SHIPPED | OUTPATIENT
Start: 2024-12-06

## 2024-12-06 NOTE — PROGRESS NOTES
"Chief Complaint  Vitamin D Deficiency (Pt here for follow up)    Subjective        Yasmin Rosa presents to [facility]  History of Present Illness  Mrs. Rosa presents for a follow up on chronic issues.      She has anxiety.  She is on Buspar 5 mg PO TID.  She feels like this has helped, but still has some uncontrolled anxiety.  She requests we increase the dose.      She requests something to help with sleep.  She has tried Melatonin, and this does not help.  She has a hard time sleeping.  She has had sleep issues for the past few months.      Objective   Vital Signs:  /80 (BP Location: Left arm, Patient Position: Sitting, Cuff Size: Adult)   Pulse 70   Temp 98 °F (36.7 °C) (Temporal)   Resp 20   Ht 161.3 cm (63.5\")   Wt 109 kg (241 lb)   SpO2 98%   BMI 42.02 kg/m²   Estimated body mass index is 42.02 kg/m² as calculated from the following:    Height as of this encounter: 161.3 cm (63.5\").    Weight as of this encounter: 109 kg (241 lb).            Physical Exam  Vitals reviewed.   Constitutional:       General: She is not in acute distress.     Appearance: Normal appearance. She is normal weight. She is not ill-appearing, toxic-appearing or diaphoretic.   HENT:      Head: Normocephalic and atraumatic.      Right Ear: External ear normal.      Left Ear: External ear normal.      Nose: Nose normal.   Eyes:      Extraocular Movements: Extraocular movements intact.   Cardiovascular:      Rate and Rhythm: Normal rate and regular rhythm.   Pulmonary:      Effort: Pulmonary effort is normal. No respiratory distress.      Breath sounds: Normal breath sounds.   Skin:     General: Skin is warm and dry.      Coloration: Skin is not jaundiced or pale.   Neurological:      Mental Status: She is alert and oriented to person, place, and time.   Psychiatric:         Mood and Affect: Mood normal.         Behavior: Behavior normal.         Thought Content: Thought content normal.  "        Judgment: Judgment normal.          Result Review :                Assessment and Plan   Diagnoses and all orders for this visit:    1. Other insomnia (Primary)  -     traZODone (DESYREL) 50 MG tablet; Take 1 tablet by mouth At Night As Needed for Sleep.  Dispense: 30 tablet; Refill: 1    2. Anxiety  -     busPIRone (BUSPAR) 10 MG tablet; Take 1 tablet by mouth 3 (Three) Times a Day.  Dispense: 30 tablet; Refill: 1             Follow Up   No follow-ups on file.  Patient was given instructions and counseling regarding her condition or for health maintenance advice. Please see specific information pulled into the AVS if appropriate.

## 2024-12-17 ENCOUNTER — TELEPHONE (OUTPATIENT)
Dept: BARIATRICS/WEIGHT MGMT | Facility: CLINIC | Age: 24
End: 2024-12-17
Payer: COMMERCIAL

## 2024-12-17 NOTE — TELEPHONE ENCOUNTER
PETER-EGD       Patient was called and reminded of their procedure with Dr Fitch on 12/20/2025        Instructions:     Eat normal the day before your procedure until 8 p.m. in the evening.    Beginning at 6pm clear liquids only-no Red or Pink in Color   Nothing to eat or drink after midnight.  Bring a list of medications.   Advised that they must bring a  as that IV sedation is being used.           The patient voiced an understanding and was agreeable.

## 2024-12-18 ENCOUNTER — TELEMEDICINE (OUTPATIENT)
Dept: BARIATRICS/WEIGHT MGMT | Facility: CLINIC | Age: 24
End: 2024-12-18
Payer: COMMERCIAL

## 2024-12-18 DIAGNOSIS — E66.01 CLASS 3 SEVERE OBESITY DUE TO EXCESS CALORIES WITH SERIOUS COMORBIDITY AND BODY MASS INDEX (BMI) OF 40.0 TO 44.9 IN ADULT: Primary | ICD-10-CM

## 2024-12-18 DIAGNOSIS — K21.9 GASTROESOPHAGEAL REFLUX DISEASE WITHOUT ESOPHAGITIS: ICD-10-CM

## 2024-12-18 DIAGNOSIS — E66.813 CLASS 3 SEVERE OBESITY DUE TO EXCESS CALORIES WITH SERIOUS COMORBIDITY AND BODY MASS INDEX (BMI) OF 40.0 TO 44.9 IN ADULT: Primary | ICD-10-CM

## 2024-12-18 NOTE — PROGRESS NOTES
Patient Care Team:  Luis Casillas MD as PCP - General (Family Medicine)    Reason for Visit:  Surgical Weight loss    You have chosen to receive care through a telehealth visit.  Do you consent to use a video/audio connection for your medical care today? Yes    Type of Visit: Video Visit  Location of Patient: Home   Location of Provider: [facility]   Yasmin Rosa is a 24 y.o. female.     Patient being seen for her 4th visit. She states she has not been keeping a food journal, and is working on following the meal plan more closely. She sees cardiology tomorrow for a surgical risk assessment. Overall she admits improvement with behavior changes.       The following portions of the patient's history were reviewed and updated as appropriate: allergies, current medications, past family history, past medical history, past social history, past surgical history, and problem list.    Objective   LMP 02/09/2022 (Exact Date)   There were no vitals filed for this visit.    Physical Exam  Neurological:      Mental Status: She is alert.   Psychiatric:         Behavior: Behavior normal.                Assessment & Plan     Encounter Diagnoses   Name Primary?    Class 3 severe obesity due to excess calories with serious comorbidity and body mass index (BMI) of 40.0 to 44.9 in adult Yes    Gastroesophageal reflux disease without esophagitis        Yasmin Rosa was seen today for follow-up, obesity, nutrition counseling and weight loss.    Today we discussed healthy changes in lifestyle, diet, and exercise. Dietician consultation obtained.  Yasmin Rosa had received handouts to her explaining the recommendation on portion sizes/appetite control/reading nutrition labels.   Intensive behavioral therapy for obesity was done today as well.     Goals for this month are:   Begin keeping a food journal, consider planning the night before   States she has a cardiology appointment  tomorrow  Psychiatry recommends 6 visits prior to doing another evaluation, currently not cleared.   States she is struggling with eliminating pasta in the evening- discussed using spaghetti squash as a substitute      Follow up in 1 month for a weight recheck.

## 2024-12-30 ENCOUNTER — TELEPHONE (OUTPATIENT)
Dept: BARIATRICS/WEIGHT MGMT | Facility: CLINIC | Age: 24
End: 2024-12-30
Payer: COMMERCIAL

## 2024-12-30 NOTE — TELEPHONE ENCOUNTER
PETER-EGD       Patient was called and reminded of their procedure with Dr Fitch on 01/03/2025         Instructions:     Eat normal the day before your procedure until 8 p.m. in the evening.    Beginning at 6pm clear liquids only-no Red or Pink in Color   Nothing to eat or drink after midnight.  Bring a list of medications.   Advised that they must bring a  as that IV sedation is being used.           The patient voiced an understanding and was agreeable.

## 2025-01-02 ENCOUNTER — TELEPHONE (OUTPATIENT)
Dept: BARIATRICS/WEIGHT MGMT | Facility: CLINIC | Age: 25
End: 2025-01-02
Payer: COMMERCIAL

## 2025-01-02 NOTE — TELEPHONE ENCOUNTER
Patient called to cancel her BA EGD for tomorrow. She is unable to proceed with BA process at this time. She inquired about weight loss medication but her insurance currently doesn't cover those type of medications. Patient was advised and said ok.

## 2025-01-03 ENCOUNTER — APPOINTMENT (OUTPATIENT)
Dept: CT IMAGING | Age: 25
End: 2025-01-03
Payer: MEDICAID

## 2025-01-03 ENCOUNTER — HOSPITAL ENCOUNTER (EMERGENCY)
Age: 25
Discharge: HOME | End: 2025-01-03
Payer: MEDICAID

## 2025-01-03 VITALS
DIASTOLIC BLOOD PRESSURE: 92 MMHG | OXYGEN SATURATION: 98 % | WEIGHT: 240 LBS | SYSTOLIC BLOOD PRESSURE: 118 MMHG | RESPIRATION RATE: 18 BRPM | TEMPERATURE: 97.8 F | HEART RATE: 85 BPM | BODY MASS INDEX: 43.9 KG/M2

## 2025-01-03 DIAGNOSIS — S39.012A STRAIN OF LUMBAR REGION, INITIAL ENCOUNTER: ICD-10-CM

## 2025-01-03 DIAGNOSIS — M62.838 MUSCLE SPASM: Primary | ICD-10-CM

## 2025-01-03 LAB
BACTERIA URNS QL MICRO: NEGATIVE /HPF
BILIRUB UR QL STRIP: NEGATIVE
CLARITY UR: CLEAR
COLOR UR: YELLOW
CRYSTALS URNS MICRO: NORMAL /HPF
EPI CELLS #/AREA URNS AUTO: 2 /HPF (ref 0–5)
GLUCOSE UR STRIP.AUTO-MCNC: NEGATIVE MG/DL
HGB UR STRIP.AUTO-MCNC: NEGATIVE MG/L
HYALINE CASTS #/AREA URNS AUTO: 1 /HPF (ref 0–8)
KETONES UR STRIP.AUTO-MCNC: NEGATIVE MG/DL
LEUKOCYTE ESTERASE UR QL STRIP.AUTO: ABNORMAL
NITRITE UR QL STRIP.AUTO: NEGATIVE
PH UR STRIP.AUTO: 5.5 [PH] (ref 5–8)
PROT UR STRIP.AUTO-MCNC: NEGATIVE MG/DL
RBC #/AREA URNS AUTO: 1 /HPF (ref 0–4)
SP GR UR STRIP.AUTO: 1.02 (ref 1–1.03)
UROBILINOGEN UR STRIP.AUTO-MCNC: 0.2 E.U./DL
WBC #/AREA URNS AUTO: 1 /HPF (ref 0–5)

## 2025-01-03 PROCEDURE — 81001 URINALYSIS AUTO W/SCOPE: CPT

## 2025-01-03 PROCEDURE — 99284 EMERGENCY DEPT VISIT MOD MDM: CPT

## 2025-01-03 PROCEDURE — 72131 CT LUMBAR SPINE W/O DYE: CPT

## 2025-01-03 RX ORDER — CYCLOBENZAPRINE HCL 10 MG
10 TABLET ORAL 3 TIMES DAILY PRN
Qty: 20 TABLET | Refills: 0 | Status: SHIPPED | OUTPATIENT
Start: 2025-01-03 | End: 2025-01-10

## 2025-01-03 RX ORDER — BUSPIRONE HYDROCHLORIDE 10 MG/1
10 TABLET ORAL 3 TIMES DAILY
COMMUNITY

## 2025-01-03 ASSESSMENT — ENCOUNTER SYMPTOMS
GASTROINTESTINAL NEGATIVE: 1
BACK PAIN: 1

## 2025-01-03 NOTE — ED PROVIDER NOTES
[facility] EMERGENCY DEPT  EMERGENCY DEPARTMENT ENCOUNTER      Pt Name: Carmel Juarez  MRN: [id number]  Birthdate 2000  Date of evaluation: 1/3/2025  Provider: VELMA Blanc CNP    CHIEF COMPLAINT       Chief Complaint   Patient presents with    Back Pain     Low back pain onset Wednesday, radiates to right leg         HISTORY OF PRESENT ILLNESS   (Location/Symptom, Timing/Onset,Context/Setting, Quality, Duration, Modifying Factors, Severity)  Note limiting factors.   Carmel Juarez is a 24 y.o. female who presents to the emergency department with complaints of low back pain for 2 days.  Patient states she was bending over to clean the toilet when she felt a pull in her back.  She was seen in urgent care yesterday and written a steroid pack.  Patient denies any injury, numbness or weakness.  Patient states the pain radiates from her back down the right leg    HPI    NursingNotes were reviewed.    REVIEW OF SYSTEMS    (2-9 systems for level 4, 10 or more for level 5)     Review of Systems   Gastrointestinal: Negative.    Genitourinary:  Negative for dysuria, flank pain and frequency.   Musculoskeletal:  Positive for back pain. Negative for gait problem.   All other systems reviewed and are negative.      A complete review of systems was performed and is negative except as noted above in the HPI.       PAST MEDICAL HISTORY     Past Medical History:   Diagnosis Date    Abnormal Pap smear of cervix     Chlamydia     Depression     Diabetes mellitus (HCC)     gestational, last pregnancy    Ectopic pregnancy     Gestational diabetes mellitus     History of blood transfusion     Mental disorder     Overactive bladder     Postpartum depression      delivery      labor     STD (sexually transmitted disease)          SURGICAL HISTORY       Past Surgical History:   Procedure Laterality Date     SECTION  05/10/2021    CHOLECYSTECTOMY, LAPAROSCOPIC N/A 2017    CHOLECYSTECTOMY LAPAROSCOPIC,

## 2025-01-07 ENCOUNTER — OFFICE VISIT (OUTPATIENT)
Dept: CARDIOLOGY | Facility: CLINIC | Age: 25
End: 2025-01-07
Payer: COMMERCIAL

## 2025-01-07 VITALS
HEIGHT: 64 IN | OXYGEN SATURATION: 98 % | DIASTOLIC BLOOD PRESSURE: 82 MMHG | SYSTOLIC BLOOD PRESSURE: 138 MMHG | HEART RATE: 74 BPM | BODY MASS INDEX: 41.66 KG/M2 | WEIGHT: 244 LBS

## 2025-01-07 DIAGNOSIS — E66.813 CLASS 3 SEVERE OBESITY DUE TO EXCESS CALORIES WITH SERIOUS COMORBIDITY AND BODY MASS INDEX (BMI) OF 40.0 TO 44.9 IN ADULT: Primary | ICD-10-CM

## 2025-01-07 DIAGNOSIS — E66.01 CLASS 3 SEVERE OBESITY DUE TO EXCESS CALORIES WITH SERIOUS COMORBIDITY AND BODY MASS INDEX (BMI) OF 40.0 TO 44.9 IN ADULT: Primary | ICD-10-CM

## 2025-01-07 PROCEDURE — 99204 OFFICE O/P NEW MOD 45 MIN: CPT | Performed by: EMERGENCY MEDICINE

## 2025-01-07 NOTE — PROGRESS NOTES
[facility] - CARDIOLOGY  New Patient Initial Outpatient Evaulation    Primary Care Physician: Luis Casillas MD    Subjective     Chief Complaint   Patient presents with    Surgical Clearance     NEW PT DR. HARO        History of Present Illness  History of Present Illness  The patient presents for surgical clearance.    She was referred to cardiology for preoperative evaluation, even though the surgery has been postponed due to personal reasons. She reports no symptoms such as chest pain, shortness of breath, dizziness, syncope, palpitations, or irregular heartbeats. Her sleep study results were normal, negating the need for a pulmonology consultation. An EKG was performed, which also yielded normal results. She has a family history of cardiac conditions, with her grandfather having succumbed to a heart attack at the age of 70 and her uncle at the age of 43. She has previously undergone surgery without any complications.    FAMILY HISTORY  Her grandfather  of a heart attack at age 70. Her uncle also  of a heart attack at age 43.    Review of Systems   Constitutional: Negative for diaphoresis, fever and malaise/fatigue.   HENT:  Negative for congestion.    Eyes:  Negative for vision loss in left eye and vision loss in right eye.   Cardiovascular:  Negative for chest pain, claudication, dyspnea on exertion, irregular heartbeat, leg swelling, orthopnea, palpitations and syncope.   Respiratory:  Negative for cough, shortness of breath and wheezing.    Hematologic/Lymphatic: Negative for adenopathy.   Skin:  Negative for rash.   Musculoskeletal:  Negative for joint pain and joint swelling.   Gastrointestinal:  Negative for abdominal pain, diarrhea, nausea and vomiting.   Neurological:  Negative for excessive daytime sleepiness, dizziness, focal weakness, light-headedness, numbness and weakness.   Psychiatric/Behavioral:  Negative for depression. The patient does not have insomnia.         Otherwise  complete ROS reviewed and negative except as mentioned in the HPI.      Past Medical History:   Past Medical History:   Diagnosis Date    Abnormal Pap smear of cervix     Anxiety     Arthritis     Chlamydia     with last pregnancy    Eczema     Gestational diabetes     History of transfusion     MRSA infection     abdomen     Ovarian cyst     Urinary tract infection     Vaginal delivery        Past Surgical History:  Past Surgical History:   Procedure Laterality Date     SECTION N/A 05/10/2021    Procedure:  SECTION PRIMARY;  Surgeon: Tomas Dawkins MD;  Location:  [facility] LABOR DELIVERY;  Service: Obstetrics/Gynecology;  Laterality: N/A;    CHOLECYSTECTOMY      D & C HYSTEROSCOPY ENDOMETRIAL ABLATION N/A 2022    Procedure: LAPARSCOPIC BILATERAL SALPINGECTOMY, INOCENCIO  ABLATION;  Surgeon: Tomas Dawkins MD;  Location:  [facility] OR;  Service: Obstetrics/Gynecology;  Laterality: N/A;    KNEE RECONSTRUCTION, MEDIAL PATELLAR FEMORAL LIGAMENT      KNEE SURGERY      OVARIAN CYST SURGERY      TOTAL LAPAROSCOPIC HYSTERECTOMY N/A 03/10/2023    Procedure: TOTAL LAPAROSCOPIC HYSTERECTOMY;  Surgeon: Tomas Dawkins MD;  Location:  [facility] OR;  Service: Obstetrics/Gynecology;  Laterality: N/A;       Family History: family history includes Cancer in her father; Diabetes in her maternal aunt.    Social History:  reports that she has never smoked. She has never used smokeless tobacco. She reports that she does not drink alcohol and does not use drugs.    Medications:  Prior to Admission medications    Medication Sig Start Date End Date Taking? Authorizing Provider   busPIRone (BUSPAR) 10 MG tablet Take 1 tablet by mouth 3 (Three) Times a Day. 24  Yes Luis Casillas MD   methylPREDNISolone (MEDROL) 4 MG dose pack Take as directed on package instructions. 25  Yes Suzanna Murrell APRN   tiZANidine (ZANAFLEX) 4 MG tablet Take 1 tablet by mouth Every 8 (Eight) Hours As Needed for  "Muscle Spasms. 9/19/24  Yes Luis Casillas MD   traZODone (DESYREL) 50 MG tablet Take 1 tablet by mouth At Night As Needed for Sleep. 12/6/24  Yes Luis Casillas MD   vitamin D (ERGOCALCIFEROL) 1.25 MG (96356 UT) capsule capsule Take 1 capsule by mouth 1 (One) Time Per Week. 11/7/24  Yes Luis Casillas MD   meloxicam (Mobic) 15 MG tablet Take 1 tablet by mouth Daily.  Patient not taking: Reported on 11/19/2024 9/19/24 1/7/25  Luis Casillas MD     Allergies:  Allergies   Allergen Reactions    Zofran [Ondansetron Hcl] Other (See Comments)     SEVERE ABD PAIN AND CONSTIPATION    Ibuprofen Other (See Comments)     Makes her \"run a fever\"    Latex Rash       Objective     Vital Signs: /82   Pulse 74   Ht 162.6 cm (64.02\")   Wt 111 kg (244 lb)   LMP 02/09/2022 (Exact Date)   SpO2 98%   BMI 41.86 kg/m²     Vitals and nursing note reviewed.   Constitutional:       Appearance: Normal and healthy appearance. Well-developed and not in distress.   Eyes:      Extraocular Movements: Extraocular movements intact.      Pupils: Pupils are equal, round, and reactive to light.   HENT:      Head: Normocephalic and atraumatic.    Mouth/Throat:      Pharynx: Oropharynx is clear.   Neck:      Vascular: JVD normal.      Trachea: Trachea normal.   Pulmonary:      Effort: Pulmonary effort is normal.      Breath sounds: Normal breath sounds. No wheezing. No rhonchi. No rales.   Cardiovascular:      PMI at left midclavicular line. Normal rate. Regular rhythm. Normal S1. Normal S2.       Murmurs: There is no murmur.      No gallop.  No click. No rub.   Pulses:     Dorsalis pedis: 2+ bilaterally.     Posterior tibial: 2+ bilaterally.  Abdominal:      General: Bowel sounds are normal.      Palpations: Abdomen is soft.      Tenderness: There is no abdominal tenderness.   Musculoskeletal: Normal range of motion.      Cervical back: Normal range of motion and neck supple. Skin:     General: Skin is warm " and dry.      Capillary Refill: Capillary refill takes less than 2 seconds.   Feet:      Right foot:      Skin integrity: Skin integrity normal.      Left foot:      Skin integrity: Skin integrity normal.   Neurological:      Mental Status: Alert and oriented to person, place and time.      Sensory: Sensation is intact.      Motor: Motor function is intact.      Coordination: Coordination is intact.   Psychiatric:         Speech: Speech normal.         Behavior: Behavior is cooperative.       Physical Exam      Results Reviewed:    Procedures    Results  Testing  EKG was normal.    Lab Results   Component Value Date    CHOL 194 10/30/2024    TRIG 331 (H) 10/30/2024    HDL 37 (L) 10/30/2024    VLDL 56 (H) 10/30/2024    LDLHDL 2.45 10/30/2024     Lab Results   Component Value Date    HGBA1C 5.30 10/30/2024       Assessment / Plan        Problem List Items Addressed This Visit       Class 3 severe obesity due to excess calories with serious comorbidity and body mass index (BMI) of 40.0 to 44.9 in adult - Primary     Assessment & Plan  1. Preoperative evaluation.  Her EKG results are within normal parameters, and she does not exhibit any risk factors for cardiac disease, except for a family history of heart problems. She is currently asymptomatic. She has been advised to contact the clinic immediately should she experience any symptoms.      Transcribed from ambient dictation for Didier Armstrong DO by Didier Armstrong DO.  01/07/25   12:57 CST    Patient or patient representative verbalized consent for the use of Ambient Listening during the visit with  Didier Armstrong DO for chart documentation. 1/7/2025  12:58 CST

## 2025-01-09 ENCOUNTER — PATIENT ROUNDING (BHMG ONLY) (OUTPATIENT)
Dept: CARDIOLOGY | Facility: CLINIC | Age: 25
End: 2025-01-09
Payer: COMMERCIAL

## 2025-01-09 NOTE — PROGRESS NOTES
A [employer] Voiceboard message has been sent to the patient for PATIENT ROUNDING with [facility].

## 2025-05-26 ENCOUNTER — RESULTS FOLLOW-UP (OUTPATIENT)
Dept: FAMILY MEDICINE CLINIC | Facility: CLINIC | Age: 25
End: 2025-05-26
Payer: COMMERCIAL

## 2025-06-26 ENCOUNTER — TELEPHONE (OUTPATIENT)
Dept: FAMILY MEDICINE CLINIC | Facility: CLINIC | Age: 25
End: 2025-06-26
Payer: COMMERCIAL

## 2025-06-26 NOTE — TELEPHONE ENCOUNTER
Called pt about referral to bariatric surgery. I wanted to see if she filled out the packet she received from Juanito and sent it back. They needed that donr before they would schedule her an appt. No answer. JUVENTINO

## 2025-08-25 ENCOUNTER — OFFICE VISIT (OUTPATIENT)
Dept: CARDIOLOGY | Facility: CLINIC | Age: 25
End: 2025-08-25
Payer: COMMERCIAL

## 2025-08-25 VITALS
HEART RATE: 78 BPM | WEIGHT: 238 LBS | DIASTOLIC BLOOD PRESSURE: 74 MMHG | SYSTOLIC BLOOD PRESSURE: 120 MMHG | HEIGHT: 64 IN | BODY MASS INDEX: 40.63 KG/M2 | OXYGEN SATURATION: 98 %

## 2025-08-25 DIAGNOSIS — E66.813 CLASS 3 SEVERE OBESITY DUE TO EXCESS CALORIES WITH SERIOUS COMORBIDITY AND BODY MASS INDEX (BMI) OF 40.0 TO 44.9 IN ADULT: ICD-10-CM

## 2025-08-25 DIAGNOSIS — Z01.810 PREOP CARDIOVASCULAR EXAM: Primary | ICD-10-CM

## 2025-08-25 PROCEDURE — 93000 ELECTROCARDIOGRAM COMPLETE: CPT | Performed by: NURSE PRACTITIONER

## 2025-08-25 PROCEDURE — 1160F RVW MEDS BY RX/DR IN RCRD: CPT | Performed by: NURSE PRACTITIONER

## 2025-08-25 PROCEDURE — 99214 OFFICE O/P EST MOD 30 MIN: CPT | Performed by: NURSE PRACTITIONER

## 2025-08-25 PROCEDURE — 1159F MED LIST DOCD IN RCRD: CPT | Performed by: NURSE PRACTITIONER

## (undated) DEVICE — GLV SURG TRIUMPH MICRO PF LTX 7.5 STRL

## (undated) DEVICE — GLV SURG OR CLASSIC PWDR LTX 7.5 STRL

## (undated) DEVICE — MARKR SKIN W/RULR AND LBL

## (undated) DEVICE — SYR CONTRL PRESS/LO FIX/M/LL W/THMB/RNG 10ML

## (undated) DEVICE — SUT MONOCRYL PLS ANTIB UND 3/0  PS1 27IN

## (undated) DEVICE — ENDOPATH PNEUMONEEDLE INSUFFLATION NEEDLES WITH LUER LOCK CONNECTORS 120MM: Brand: ENDOPATH

## (undated) DEVICE — BAPTIST TURNOVER KIT: Brand: MEDLINE INDUSTRIES, INC.

## (undated) DEVICE — INTENDED FOR TISSUE SEPARATION, AND OTHER PROCEDURES THAT REQUIRE A SHARP SURGICAL BLADE TO PUNCTURE OR CUT.: Brand: BARD-PARKER ®  SAFETY SCALPED

## (undated) DEVICE — Device

## (undated) DEVICE — APPL CHLORAPREP W/TINT 26ML ORNG

## (undated) DEVICE — SYR LL TP 10ML STRL

## (undated) DEVICE — CYSTO/BLADDER IRRIGATION SET, REGULATING CLAMP

## (undated) DEVICE — ABSORBABLE SINGLE STITCH RELOAD: Brand: POLYSORB

## (undated) DEVICE — ADHS LIQ MASTISOL 2/3ML

## (undated) DEVICE — SUTURE MCRYL SZ 4-0 L18IN ABSRB UD L19MM PS-2 3/8 CIR PRIM Y496G

## (undated) DEVICE — GENERAL LAP CDS

## (undated) DEVICE — STERILE LATEX POWDER FREE SURGICAL GLOVES WITH HYDROGEL COATING: Brand: PROTEXIS

## (undated) DEVICE — ENSEAL X1 TISSUE SEALER, CURVED JAW, 25 CM SHAFT LENGTH: Brand: ENSEAL

## (undated) DEVICE — STPLR SKIN SUBCUTICULAR INSORB 2030

## (undated) DEVICE — CLTH CLENS READYCLEANSE PERI CARE PK/5

## (undated) DEVICE — SUT GUT CHRM 1 CTX 36IN 905H

## (undated) DEVICE — ENDOPATH XCEL WITH OPTIVIEW TECHNOLOGY DILATING TIP TROCARS WITH STABILITY SLEEVES: Brand: ENDOPATH XCEL OPTIVIEW

## (undated) DEVICE — TISSUE RETRIEVAL SYSTEM: Brand: INZII RETRIEVAL SYSTEM

## (undated) DEVICE — PK LAP GYN 30

## (undated) DEVICE — YANKAUER,BULB TIP WITH VENT: Brand: ARGYLE

## (undated) DEVICE — SUTURE VCRL SZ 3-0 L27IN ABSRB UD L26MM SH 1/2 CIR J416H

## (undated) DEVICE — DRSNG BRDR MEPILEX P/OP SIL 4X12IN

## (undated) DEVICE — MONOPOLAR METZENBAUM SCISSOR, MINI BLADE TIP, DISPOSABLE: Brand: MONOPOLAR METZENBAUM SCISSOR, MINI BLADE TIP, DISPOSABLE

## (undated) DEVICE — INTENDED FOR TISSUE SEPARATION, AND OTHER PROCEDURES THAT REQUIRE A SHARP SURGICAL BLADE TO PUNCTURE OR CUT.: Brand: BARD-PARKER ® DISPOSABLE SCALPELS

## (undated) DEVICE — TIBURON DRAPE TOWELS: Brand: CONVERTORS

## (undated) DEVICE — SUT VIC 0 CTX 27IN J364H BX/36

## (undated) DEVICE — TUBING, SUCTION, 1/4" X 12', STRAIGHT: Brand: MEDLINE

## (undated) DEVICE — VCARE MEDIUM, UTERINE MANIPULATOR, VAGINAL-CERVICAL-AHLUWALIA'S-RETRACTOR-ELEVATOR: Brand: VCARE

## (undated) DEVICE — ENDOPATH XCEL WITH OPTIVIEW TECHNOLOGY UNIVERSAL TROCAR STABILITY SLEEVES: Brand: ENDOPATH XCEL OPTIVIEW

## (undated) DEVICE — TRAY SURG PROC CHOLE FLX

## (undated) DEVICE — SUTURE SZ 0 27IN 5/8 CIR UR-6  TAPER PT VIOLET ABSRB VICRYL J603H

## (undated) DEVICE — SUTURING DEVICE: Brand: ENDO STITCH

## (undated) DEVICE — IRRIGATOR BULB ASEPTO 60CC STRL

## (undated) DEVICE — SUT GUT CHRM 2/0 SH 36IN G173H

## (undated) DEVICE — DECANTER VI VENT W/ VLV FOR ASEP TRNSF OF FLD

## (undated) DEVICE — PAD D&C: Brand: MEDLINE INDUSTRIES, INC.

## (undated) DEVICE — HARMONIC ACE +7 SHEARS ADVANCED HEMOSTASIS 5MM DIAMETER 23CM SHAFT LENGTH  FOR USE WITH GRAY HAND PIECE ONLY: Brand: HARMONIC ACE

## (undated) DEVICE — SUT GUT CHRM 0 CT1 27IN  812H

## (undated) DEVICE — 2, DISPOSABLE SUCTION/IRRIGATOR WITHOUT DISPOSABLE TIP: Brand: STRYKEFLOW

## (undated) DEVICE — PAD C-SECTION: Brand: MEDLINE INDUSTRIES, INC.

## (undated) DEVICE — TOTAL TRAY, 16FR 10ML SIL FOLEY, URN: Brand: MEDLINE

## (undated) DEVICE — ADHESIVE SKIN CLSR 0.7ML TOP DERMBND ADV

## (undated) DEVICE — STERILE MINERVA DISPOSABLE HANDPIECECONTENTS:(1) ONE SINGLE USE STERILE MINERVA ES DISPOSABLE HANDPIECE (1) ONE SINGLE USE STERILE SYRINGE(1) ONE SINGLE USE STERILE 8MM HEGAR DILATOR(1) ONE SINGLE USE NON-STERILE DESICCANT(1) ONE NON-STERILE HANDPIECE INSTRUCTIONS FOR USE(1)  ONE NON-STERILE DILATOR INSTRUCTIONS FOR USE: Brand: MINERVA SINGLE STERILE DISPOSABLE HANDPIECE

## (undated) DEVICE — 3M™ STERI-STRIP™ REINFORCED ADHESIVE SKIN CLOSURES, R1547, 1/2 IN X 4 IN (12 MM X 100 MM), 6 STRIPS/ENVELOPE: Brand: 3M™ STERI-STRIP™

## (undated) DEVICE — SUT MONOCRYL 1 CT1 36IN MCP347H